# Patient Record
Sex: FEMALE | Race: WHITE | NOT HISPANIC OR LATINO | Employment: OTHER
[De-identification: names, ages, dates, MRNs, and addresses within clinical notes are randomized per-mention and may not be internally consistent; named-entity substitution may affect disease eponyms.]

---

## 2017-01-05 ENCOUNTER — RECORDS - HEALTHEAST (OUTPATIENT)
Dept: ADMINISTRATIVE | Facility: OTHER | Age: 72
End: 2017-01-05

## 2017-03-06 ENCOUNTER — OFFICE VISIT - HEALTHEAST (OUTPATIENT)
Dept: INTERNAL MEDICINE | Facility: CLINIC | Age: 72
End: 2017-03-06

## 2017-03-06 DIAGNOSIS — M20.42 HAMMER TOE OF LEFT FOOT: ICD-10-CM

## 2017-03-06 DIAGNOSIS — E03.4 HYPOTHYROIDISM DUE TO ACQUIRED ATROPHY OF THYROID: ICD-10-CM

## 2017-03-06 DIAGNOSIS — E66.9 OBESITY (BMI 35.0-39.9 WITHOUT COMORBIDITY): ICD-10-CM

## 2017-03-06 DIAGNOSIS — E78.2 MIXED HYPERLIPIDEMIA: ICD-10-CM

## 2017-03-06 DIAGNOSIS — Z23 NEED FOR VACCINATION: ICD-10-CM

## 2017-03-06 DIAGNOSIS — Z01.818 PREOP EXAM FOR INTERNAL MEDICINE: ICD-10-CM

## 2017-03-06 ASSESSMENT — MIFFLIN-ST. JEOR: SCORE: 1693.26

## 2017-03-13 ENCOUNTER — RECORDS - HEALTHEAST (OUTPATIENT)
Dept: ADMINISTRATIVE | Facility: OTHER | Age: 72
End: 2017-03-13

## 2017-03-30 ENCOUNTER — RECORDS - HEALTHEAST (OUTPATIENT)
Dept: ADMINISTRATIVE | Facility: OTHER | Age: 72
End: 2017-03-30

## 2017-04-13 ENCOUNTER — COMMUNICATION - HEALTHEAST (OUTPATIENT)
Dept: INTERNAL MEDICINE | Facility: CLINIC | Age: 72
End: 2017-04-13

## 2017-04-13 DIAGNOSIS — E03.4 HYPOTHYROIDISM DUE TO ACQUIRED ATROPHY OF THYROID: ICD-10-CM

## 2017-04-18 ENCOUNTER — HOSPITAL ENCOUNTER (OUTPATIENT)
Dept: MAMMOGRAPHY | Facility: CLINIC | Age: 72
Discharge: HOME | End: 2017-04-18
Attending: INTERNAL MEDICINE

## 2017-04-18 DIAGNOSIS — Z12.31 VISIT FOR SCREENING MAMMOGRAM: ICD-10-CM

## 2017-05-24 ENCOUNTER — COMMUNICATION - HEALTHEAST (OUTPATIENT)
Dept: INTERNAL MEDICINE | Facility: CLINIC | Age: 72
End: 2017-05-24

## 2017-09-14 ENCOUNTER — RECORDS - HEALTHEAST (OUTPATIENT)
Dept: ADMINISTRATIVE | Facility: OTHER | Age: 72
End: 2017-09-14

## 2017-12-04 ENCOUNTER — COMMUNICATION - HEALTHEAST (OUTPATIENT)
Dept: INTERNAL MEDICINE | Facility: CLINIC | Age: 72
End: 2017-12-04

## 2017-12-04 ENCOUNTER — OFFICE VISIT - HEALTHEAST (OUTPATIENT)
Dept: INTERNAL MEDICINE | Facility: CLINIC | Age: 72
End: 2017-12-04

## 2017-12-04 DIAGNOSIS — J32.9 SINUSITIS: ICD-10-CM

## 2017-12-04 DIAGNOSIS — J04.0 LARYNGITIS: ICD-10-CM

## 2017-12-27 ENCOUNTER — COMMUNICATION - HEALTHEAST (OUTPATIENT)
Dept: SCHEDULING | Facility: CLINIC | Age: 72
End: 2017-12-27

## 2017-12-27 ENCOUNTER — OFFICE VISIT - HEALTHEAST (OUTPATIENT)
Dept: INTERNAL MEDICINE | Facility: CLINIC | Age: 72
End: 2017-12-27

## 2017-12-27 ENCOUNTER — COMMUNICATION - HEALTHEAST (OUTPATIENT)
Dept: INTERNAL MEDICINE | Facility: CLINIC | Age: 72
End: 2017-12-27

## 2017-12-27 DIAGNOSIS — J20.8 VIRAL BRONCHITIS: ICD-10-CM

## 2017-12-27 ASSESSMENT — MIFFLIN-ST. JEOR: SCORE: 1693.26

## 2018-02-02 ENCOUNTER — COMMUNICATION - HEALTHEAST (OUTPATIENT)
Dept: INTERNAL MEDICINE | Facility: CLINIC | Age: 73
End: 2018-02-02

## 2018-02-02 DIAGNOSIS — E03.4 HYPOTHYROIDISM DUE TO ACQUIRED ATROPHY OF THYROID: ICD-10-CM

## 2018-02-26 ENCOUNTER — RECORDS - HEALTHEAST (OUTPATIENT)
Dept: ADMINISTRATIVE | Facility: OTHER | Age: 73
End: 2018-02-26

## 2018-04-13 ENCOUNTER — AMBULATORY - HEALTHEAST (OUTPATIENT)
Dept: INTERNAL MEDICINE | Facility: CLINIC | Age: 73
End: 2018-04-13

## 2018-04-16 ENCOUNTER — OFFICE VISIT - HEALTHEAST (OUTPATIENT)
Dept: INTERNAL MEDICINE | Facility: CLINIC | Age: 73
End: 2018-04-16

## 2018-04-16 DIAGNOSIS — M15.0 PRIMARY OSTEOARTHRITIS INVOLVING MULTIPLE JOINTS: ICD-10-CM

## 2018-04-16 DIAGNOSIS — E03.4 HYPOTHYROIDISM DUE TO ACQUIRED ATROPHY OF THYROID: ICD-10-CM

## 2018-04-16 DIAGNOSIS — Z79.899 MEDICATION MANAGEMENT: ICD-10-CM

## 2018-04-16 DIAGNOSIS — E66.9 OBESITY (BMI 35.0-39.9 WITHOUT COMORBIDITY): ICD-10-CM

## 2018-04-16 DIAGNOSIS — Z78.0 POSTMENOPAUSAL: ICD-10-CM

## 2018-04-16 DIAGNOSIS — Z00.00 ROUTINE GENERAL MEDICAL EXAMINATION AT A HEALTH CARE FACILITY: ICD-10-CM

## 2018-04-16 DIAGNOSIS — E78.2 MIXED HYPERLIPIDEMIA: ICD-10-CM

## 2018-04-16 LAB
ALBUMIN SERPL-MCNC: 4.1 G/DL (ref 3.5–5)
ALBUMIN UR-MCNC: NEGATIVE MG/DL
ALP SERPL-CCNC: 60 U/L (ref 45–120)
ALT SERPL W P-5'-P-CCNC: 47 U/L (ref 0–45)
ANION GAP SERPL CALCULATED.3IONS-SCNC: 10 MMOL/L (ref 5–18)
APPEARANCE UR: CLEAR
AST SERPL W P-5'-P-CCNC: 37 U/L (ref 0–40)
BASOPHILS # BLD AUTO: 0.1 THOU/UL (ref 0–0.2)
BASOPHILS NFR BLD AUTO: 1 % (ref 0–2)
BILIRUB SERPL-MCNC: 0.8 MG/DL (ref 0–1)
BILIRUB UR QL STRIP: NEGATIVE
BUN SERPL-MCNC: 11 MG/DL (ref 8–28)
CALCIUM SERPL-MCNC: 10.4 MG/DL (ref 8.5–10.5)
CHLORIDE BLD-SCNC: 101 MMOL/L (ref 98–107)
CHOLEST SERPL-MCNC: 202 MG/DL
CO2 SERPL-SCNC: 28 MMOL/L (ref 22–31)
COLOR UR AUTO: YELLOW
CREAT SERPL-MCNC: 0.83 MG/DL (ref 0.6–1.1)
EOSINOPHIL # BLD AUTO: 0.2 THOU/UL (ref 0–0.4)
EOSINOPHIL NFR BLD AUTO: 2 % (ref 0–6)
ERYTHROCYTE [DISTWIDTH] IN BLOOD BY AUTOMATED COUNT: 12.4 % (ref 11–14.5)
FASTING STATUS PATIENT QL REPORTED: YES
GFR SERPL CREATININE-BSD FRML MDRD: >60 ML/MIN/1.73M2
GLUCOSE BLD-MCNC: 98 MG/DL (ref 70–125)
GLUCOSE UR STRIP-MCNC: NEGATIVE MG/DL
HCT VFR BLD AUTO: 45.9 % (ref 35–47)
HDLC SERPL-MCNC: 58 MG/DL
HGB BLD-MCNC: 14.8 G/DL (ref 12–16)
HGB UR QL STRIP: NEGATIVE
KETONES UR STRIP-MCNC: NEGATIVE MG/DL
LDLC SERPL CALC-MCNC: 104 MG/DL
LEUKOCYTE ESTERASE UR QL STRIP: NEGATIVE
LYMPHOCYTES # BLD AUTO: 2.2 THOU/UL (ref 0.8–4.4)
LYMPHOCYTES NFR BLD AUTO: 32 % (ref 20–40)
MCH RBC QN AUTO: 30.6 PG (ref 27–34)
MCHC RBC AUTO-ENTMCNC: 32.2 G/DL (ref 32–36)
MCV RBC AUTO: 95 FL (ref 80–100)
MONOCYTES # BLD AUTO: 0.5 THOU/UL (ref 0–0.9)
MONOCYTES NFR BLD AUTO: 8 % (ref 2–10)
NEUTROPHILS # BLD AUTO: 3.9 THOU/UL (ref 2–7.7)
NEUTROPHILS NFR BLD AUTO: 58 % (ref 50–70)
NITRATE UR QL: NEGATIVE
PH UR STRIP: 6 [PH] (ref 5–8)
PLATELET # BLD AUTO: 192 THOU/UL (ref 140–440)
PMV BLD AUTO: 11.4 FL (ref 8.5–12.5)
POTASSIUM BLD-SCNC: 4.7 MMOL/L (ref 3.5–5)
PROT SERPL-MCNC: 7.3 G/DL (ref 6–8)
RBC # BLD AUTO: 4.83 MILL/UL (ref 3.8–5.4)
SODIUM SERPL-SCNC: 139 MMOL/L (ref 136–145)
SP GR UR STRIP: 1.01 (ref 1–1.03)
TRIGL SERPL-MCNC: 200 MG/DL
TSH SERPL DL<=0.005 MIU/L-ACNC: 0.61 UIU/ML (ref 0.3–5)
UROBILINOGEN UR STRIP-ACNC: NORMAL
WBC: 6.8 THOU/UL (ref 4–11)

## 2018-04-16 ASSESSMENT — MIFFLIN-ST. JEOR: SCORE: 1702.33

## 2018-04-17 ENCOUNTER — COMMUNICATION - HEALTHEAST (OUTPATIENT)
Dept: INTERNAL MEDICINE | Facility: CLINIC | Age: 73
End: 2018-04-17

## 2018-04-20 ENCOUNTER — HOSPITAL ENCOUNTER (OUTPATIENT)
Dept: MAMMOGRAPHY | Facility: CLINIC | Age: 73
Discharge: HOME | End: 2018-04-20
Attending: INTERNAL MEDICINE

## 2018-04-20 DIAGNOSIS — Z12.31 VISIT FOR SCREENING MAMMOGRAM: ICD-10-CM

## 2018-04-27 ENCOUNTER — RECORDS - HEALTHEAST (OUTPATIENT)
Dept: BONE DENSITY | Facility: CLINIC | Age: 73
End: 2018-04-27

## 2018-04-27 ENCOUNTER — RECORDS - HEALTHEAST (OUTPATIENT)
Dept: ADMINISTRATIVE | Facility: OTHER | Age: 73
End: 2018-04-27

## 2018-04-27 DIAGNOSIS — Z78.0 ASYMPTOMATIC MENOPAUSAL STATE: ICD-10-CM

## 2018-05-14 ENCOUNTER — COMMUNICATION - HEALTHEAST (OUTPATIENT)
Dept: INTERNAL MEDICINE | Facility: CLINIC | Age: 73
End: 2018-05-14

## 2018-05-16 ENCOUNTER — RECORDS - HEALTHEAST (OUTPATIENT)
Dept: ADMINISTRATIVE | Facility: OTHER | Age: 73
End: 2018-05-16

## 2018-05-25 ENCOUNTER — RECORDS - HEALTHEAST (OUTPATIENT)
Dept: ADMINISTRATIVE | Facility: OTHER | Age: 73
End: 2018-05-25

## 2018-06-26 ENCOUNTER — COMMUNICATION - HEALTHEAST (OUTPATIENT)
Dept: INTERNAL MEDICINE | Facility: CLINIC | Age: 73
End: 2018-06-26

## 2018-06-26 DIAGNOSIS — E03.4 HYPOTHYROIDISM DUE TO ACQUIRED ATROPHY OF THYROID: ICD-10-CM

## 2018-06-26 DIAGNOSIS — E78.2 MIXED HYPERLIPIDEMIA: ICD-10-CM

## 2018-10-09 ENCOUNTER — RECORDS - HEALTHEAST (OUTPATIENT)
Dept: ADMINISTRATIVE | Facility: OTHER | Age: 73
End: 2018-10-09

## 2018-10-11 ENCOUNTER — RECORDS - HEALTHEAST (OUTPATIENT)
Dept: ADMINISTRATIVE | Facility: OTHER | Age: 73
End: 2018-10-11

## 2018-11-05 ENCOUNTER — RECORDS - HEALTHEAST (OUTPATIENT)
Dept: ADMINISTRATIVE | Facility: OTHER | Age: 73
End: 2018-11-05

## 2019-04-09 ENCOUNTER — OFFICE VISIT - HEALTHEAST (OUTPATIENT)
Dept: INTERNAL MEDICINE | Facility: CLINIC | Age: 74
End: 2019-04-09

## 2019-04-09 DIAGNOSIS — J40 BRONCHITIS: ICD-10-CM

## 2019-04-30 ENCOUNTER — HOSPITAL ENCOUNTER (OUTPATIENT)
Dept: MAMMOGRAPHY | Facility: CLINIC | Age: 74
Discharge: HOME | End: 2019-04-30
Attending: INTERNAL MEDICINE

## 2019-04-30 DIAGNOSIS — Z12.31 ENCOUNTER FOR SCREENING MAMMOGRAM FOR BREAST CANCER: ICD-10-CM

## 2019-05-07 ENCOUNTER — OFFICE VISIT - HEALTHEAST (OUTPATIENT)
Dept: INTERNAL MEDICINE | Facility: CLINIC | Age: 74
End: 2019-05-07

## 2019-05-07 ENCOUNTER — COMMUNICATION - HEALTHEAST (OUTPATIENT)
Dept: INTERNAL MEDICINE | Facility: CLINIC | Age: 74
End: 2019-05-07

## 2019-05-07 DIAGNOSIS — Z00.00 ROUTINE GENERAL MEDICAL EXAMINATION AT A HEALTH CARE FACILITY: ICD-10-CM

## 2019-05-07 DIAGNOSIS — E66.9 OBESITY (BMI 35.0-39.9 WITHOUT COMORBIDITY): ICD-10-CM

## 2019-05-07 DIAGNOSIS — E03.4 HYPOTHYROIDISM DUE TO ACQUIRED ATROPHY OF THYROID: ICD-10-CM

## 2019-05-07 DIAGNOSIS — E78.2 MIXED HYPERLIPIDEMIA: ICD-10-CM

## 2019-05-07 DIAGNOSIS — M15.0 PRIMARY OSTEOARTHRITIS INVOLVING MULTIPLE JOINTS: ICD-10-CM

## 2019-05-07 DIAGNOSIS — Z79.899 MEDICATION MANAGEMENT: ICD-10-CM

## 2019-05-07 LAB
ALBUMIN SERPL-MCNC: 4.1 G/DL (ref 3.5–5)
ALBUMIN UR-MCNC: NEGATIVE MG/DL
ALP SERPL-CCNC: 49 U/L (ref 45–120)
ALT SERPL W P-5'-P-CCNC: 39 U/L (ref 0–45)
ANION GAP SERPL CALCULATED.3IONS-SCNC: 11 MMOL/L (ref 5–18)
APPEARANCE UR: CLEAR
AST SERPL W P-5'-P-CCNC: 38 U/L (ref 0–40)
BASOPHILS # BLD AUTO: 0 THOU/UL (ref 0–0.2)
BASOPHILS NFR BLD AUTO: 1 % (ref 0–2)
BILIRUB SERPL-MCNC: 0.4 MG/DL (ref 0–1)
BILIRUB UR QL STRIP: NEGATIVE
BUN SERPL-MCNC: 12 MG/DL (ref 8–28)
CALCIUM SERPL-MCNC: 10.2 MG/DL (ref 8.5–10.5)
CHLORIDE BLD-SCNC: 106 MMOL/L (ref 98–107)
CHOLEST SERPL-MCNC: 184 MG/DL
CO2 SERPL-SCNC: 24 MMOL/L (ref 22–31)
COLOR UR AUTO: YELLOW
CREAT SERPL-MCNC: 0.85 MG/DL (ref 0.6–1.1)
EOSINOPHIL # BLD AUTO: 0.2 THOU/UL (ref 0–0.4)
EOSINOPHIL NFR BLD AUTO: 4 % (ref 0–6)
ERYTHROCYTE [DISTWIDTH] IN BLOOD BY AUTOMATED COUNT: 13 % (ref 11–14.5)
FASTING STATUS PATIENT QL REPORTED: NORMAL
GFR SERPL CREATININE-BSD FRML MDRD: >60 ML/MIN/1.73M2
GLUCOSE BLD-MCNC: 97 MG/DL (ref 70–125)
GLUCOSE UR STRIP-MCNC: NEGATIVE MG/DL
HCT VFR BLD AUTO: 46.1 % (ref 35–47)
HDLC SERPL-MCNC: 60 MG/DL
HGB BLD-MCNC: 14.9 G/DL (ref 12–16)
HGB UR QL STRIP: NEGATIVE
KETONES UR STRIP-MCNC: NEGATIVE MG/DL
LDLC SERPL CALC-MCNC: 95 MG/DL
LEUKOCYTE ESTERASE UR QL STRIP: NEGATIVE
LYMPHOCYTES # BLD AUTO: 1.5 THOU/UL (ref 0.8–4.4)
LYMPHOCYTES NFR BLD AUTO: 36 % (ref 20–40)
MCH RBC QN AUTO: 29.9 PG (ref 27–34)
MCHC RBC AUTO-ENTMCNC: 32.3 G/DL (ref 32–36)
MCV RBC AUTO: 93 FL (ref 80–100)
MONOCYTES # BLD AUTO: 0.5 THOU/UL (ref 0–0.9)
MONOCYTES NFR BLD AUTO: 11 % (ref 2–10)
NEUTROPHILS # BLD AUTO: 2 THOU/UL (ref 2–7.7)
NEUTROPHILS NFR BLD AUTO: 48 % (ref 50–70)
NITRATE UR QL: NEGATIVE
PH UR STRIP: 6 [PH] (ref 5–8)
PLATELET # BLD AUTO: 163 THOU/UL (ref 140–440)
PMV BLD AUTO: 10.9 FL (ref 8.5–12.5)
POTASSIUM BLD-SCNC: 5 MMOL/L (ref 3.5–5)
PROT SERPL-MCNC: 7.5 G/DL (ref 6–8)
RBC # BLD AUTO: 4.98 MILL/UL (ref 3.8–5.4)
SODIUM SERPL-SCNC: 141 MMOL/L (ref 136–145)
SP GR UR STRIP: 1.02 (ref 1–1.03)
TRIGL SERPL-MCNC: 146 MG/DL
TSH SERPL DL<=0.005 MIU/L-ACNC: 1.45 UIU/ML (ref 0.3–5)
UROBILINOGEN UR STRIP-ACNC: NORMAL
WBC: 4.1 THOU/UL (ref 4–11)

## 2019-05-07 ASSESSMENT — MIFFLIN-ST. JEOR: SCORE: 1706.87

## 2019-05-08 ENCOUNTER — COMMUNICATION - HEALTHEAST (OUTPATIENT)
Dept: INTERNAL MEDICINE | Facility: CLINIC | Age: 74
End: 2019-05-08

## 2019-05-08 LAB
ATRIAL RATE - MUSE: 62 BPM
DIASTOLIC BLOOD PRESSURE - MUSE: NORMAL MMHG
INTERPRETATION ECG - MUSE: NORMAL
P AXIS - MUSE: 22 DEGREES
PR INTERVAL - MUSE: 186 MS
QRS DURATION - MUSE: 88 MS
QT - MUSE: 406 MS
QTC - MUSE: 412 MS
R AXIS - MUSE: 55 DEGREES
SYSTOLIC BLOOD PRESSURE - MUSE: NORMAL MMHG
T AXIS - MUSE: 13 DEGREES
VENTRICULAR RATE- MUSE: 62 BPM

## 2019-05-13 ENCOUNTER — RECORDS - HEALTHEAST (OUTPATIENT)
Dept: ADMINISTRATIVE | Facility: OTHER | Age: 74
End: 2019-05-13

## 2019-11-13 ENCOUNTER — RECORDS - HEALTHEAST (OUTPATIENT)
Dept: ADMINISTRATIVE | Facility: OTHER | Age: 74
End: 2019-11-13

## 2020-01-08 ENCOUNTER — RECORDS - HEALTHEAST (OUTPATIENT)
Dept: ADMINISTRATIVE | Facility: OTHER | Age: 75
End: 2020-01-08

## 2020-01-17 ENCOUNTER — RECORDS - HEALTHEAST (OUTPATIENT)
Dept: ADMINISTRATIVE | Facility: OTHER | Age: 75
End: 2020-01-17

## 2020-01-30 ENCOUNTER — RECORDS - HEALTHEAST (OUTPATIENT)
Dept: ADMINISTRATIVE | Facility: OTHER | Age: 75
End: 2020-01-30

## 2020-02-04 ENCOUNTER — RECORDS - HEALTHEAST (OUTPATIENT)
Dept: ADMINISTRATIVE | Facility: OTHER | Age: 75
End: 2020-02-04

## 2020-02-13 ENCOUNTER — RECORDS - HEALTHEAST (OUTPATIENT)
Dept: ADMINISTRATIVE | Facility: OTHER | Age: 75
End: 2020-02-13

## 2020-03-18 ENCOUNTER — COMMUNICATION - HEALTHEAST (OUTPATIENT)
Dept: INTERNAL MEDICINE | Facility: CLINIC | Age: 75
End: 2020-03-18

## 2020-03-18 DIAGNOSIS — E03.4 HYPOTHYROIDISM DUE TO ACQUIRED ATROPHY OF THYROID: ICD-10-CM

## 2020-03-18 DIAGNOSIS — E78.2 MIXED HYPERLIPIDEMIA: ICD-10-CM

## 2020-05-18 ENCOUNTER — RECORDS - HEALTHEAST (OUTPATIENT)
Dept: ADMINISTRATIVE | Facility: OTHER | Age: 75
End: 2020-05-18

## 2020-06-10 ENCOUNTER — COMMUNICATION - HEALTHEAST (OUTPATIENT)
Dept: INTERNAL MEDICINE | Facility: CLINIC | Age: 75
End: 2020-06-10

## 2020-06-10 DIAGNOSIS — E03.4 HYPOTHYROIDISM DUE TO ACQUIRED ATROPHY OF THYROID: ICD-10-CM

## 2020-06-10 DIAGNOSIS — E78.2 MIXED HYPERLIPIDEMIA: ICD-10-CM

## 2020-06-11 RX ORDER — LEVOTHYROXINE SODIUM 125 UG/1
TABLET ORAL
Qty: 90 TABLET | Refills: 3 | Status: SHIPPED | OUTPATIENT
Start: 2020-06-11 | End: 2021-07-19

## 2020-06-11 RX ORDER — PRAVASTATIN SODIUM 20 MG
TABLET ORAL
Qty: 90 TABLET | Refills: 3 | Status: SHIPPED | OUTPATIENT
Start: 2020-06-11 | End: 2021-07-19

## 2020-07-21 ENCOUNTER — OFFICE VISIT - HEALTHEAST (OUTPATIENT)
Dept: INTERNAL MEDICINE | Facility: CLINIC | Age: 75
End: 2020-07-21

## 2020-07-21 DIAGNOSIS — E66.9 OBESITY (BMI 35.0-39.9 WITHOUT COMORBIDITY): ICD-10-CM

## 2020-07-21 DIAGNOSIS — Z79.899 MEDICATION MANAGEMENT: ICD-10-CM

## 2020-07-21 DIAGNOSIS — E03.4 HYPOTHYROIDISM DUE TO ACQUIRED ATROPHY OF THYROID: ICD-10-CM

## 2020-07-21 DIAGNOSIS — E78.2 MIXED HYPERLIPIDEMIA: ICD-10-CM

## 2020-07-21 DIAGNOSIS — I10 ESSENTIAL HYPERTENSION, BENIGN: ICD-10-CM

## 2020-07-21 DIAGNOSIS — M15.0 PRIMARY OSTEOARTHRITIS INVOLVING MULTIPLE JOINTS: ICD-10-CM

## 2020-07-21 DIAGNOSIS — Z00.00 HEALTHCARE MAINTENANCE: ICD-10-CM

## 2020-07-21 LAB
ALBUMIN SERPL-MCNC: 4.2 G/DL (ref 3.5–5)
ALBUMIN UR-MCNC: NEGATIVE MG/DL
ALP SERPL-CCNC: 53 U/L (ref 45–120)
ALT SERPL W P-5'-P-CCNC: 41 U/L (ref 0–45)
ANION GAP SERPL CALCULATED.3IONS-SCNC: 9 MMOL/L (ref 5–18)
APPEARANCE UR: CLEAR
AST SERPL W P-5'-P-CCNC: 31 U/L (ref 0–40)
ATRIAL RATE - MUSE: 59 BPM
BASOPHILS # BLD AUTO: 0.1 THOU/UL (ref 0–0.2)
BASOPHILS NFR BLD AUTO: 1 % (ref 0–2)
BILIRUB SERPL-MCNC: 0.5 MG/DL (ref 0–1)
BILIRUB UR QL STRIP: NEGATIVE
BUN SERPL-MCNC: 14 MG/DL (ref 8–28)
CALCIUM SERPL-MCNC: 10.3 MG/DL (ref 8.5–10.5)
CHLORIDE BLD-SCNC: 104 MMOL/L (ref 98–107)
CHOLEST SERPL-MCNC: 182 MG/DL
CO2 SERPL-SCNC: 29 MMOL/L (ref 22–31)
COLOR UR AUTO: YELLOW
CREAT SERPL-MCNC: 1 MG/DL (ref 0.6–1.1)
DIASTOLIC BLOOD PRESSURE - MUSE: NORMAL
EOSINOPHIL # BLD AUTO: 0.2 THOU/UL (ref 0–0.4)
EOSINOPHIL NFR BLD AUTO: 3 % (ref 0–6)
ERYTHROCYTE [DISTWIDTH] IN BLOOD BY AUTOMATED COUNT: 10.9 % (ref 11–14.5)
FASTING STATUS PATIENT QL REPORTED: YES
GFR SERPL CREATININE-BSD FRML MDRD: 54 ML/MIN/1.73M2
GLUCOSE BLD-MCNC: 109 MG/DL (ref 70–125)
GLUCOSE UR STRIP-MCNC: NEGATIVE MG/DL
HCT VFR BLD AUTO: 43.3 % (ref 35–47)
HDLC SERPL-MCNC: 63 MG/DL
HGB BLD-MCNC: 14.7 G/DL (ref 12–16)
HGB UR QL STRIP: NEGATIVE
INTERPRETATION ECG - MUSE: NORMAL
KETONES UR STRIP-MCNC: NEGATIVE MG/DL
LDLC SERPL CALC-MCNC: 92 MG/DL
LEUKOCYTE ESTERASE UR QL STRIP: NEGATIVE
LYMPHOCYTES # BLD AUTO: 2.2 THOU/UL (ref 0.8–4.4)
LYMPHOCYTES NFR BLD AUTO: 35 % (ref 20–40)
MCH RBC QN AUTO: 31 PG (ref 27–34)
MCHC RBC AUTO-ENTMCNC: 33.9 G/DL (ref 32–36)
MCV RBC AUTO: 91 FL (ref 80–100)
MONOCYTES # BLD AUTO: 0.4 THOU/UL (ref 0–0.9)
MONOCYTES NFR BLD AUTO: 7 % (ref 2–10)
NEUTROPHILS # BLD AUTO: 3.5 THOU/UL (ref 2–7.7)
NEUTROPHILS NFR BLD AUTO: 55 % (ref 50–70)
NITRATE UR QL: NEGATIVE
P AXIS - MUSE: 22 DEGREES
PH UR STRIP: 5.5 [PH] (ref 5–8)
PLATELET # BLD AUTO: 198 THOU/UL (ref 140–440)
PMV BLD AUTO: 7.7 FL (ref 7–10)
POTASSIUM BLD-SCNC: 4.6 MMOL/L (ref 3.5–5)
PR INTERVAL - MUSE: 174 MS
PROT SERPL-MCNC: 7.3 G/DL (ref 6–8)
QRS DURATION - MUSE: 86 MS
QT - MUSE: 400 MS
QTC - MUSE: 396 MS
R AXIS - MUSE: 2 DEGREES
RBC # BLD AUTO: 4.75 MILL/UL (ref 3.8–5.4)
SODIUM SERPL-SCNC: 142 MMOL/L (ref 136–145)
SP GR UR STRIP: 1.02 (ref 1–1.03)
SYSTOLIC BLOOD PRESSURE - MUSE: NORMAL
T AXIS - MUSE: 39 DEGREES
TRIGL SERPL-MCNC: 133 MG/DL
TSH SERPL DL<=0.005 MIU/L-ACNC: 0.99 UIU/ML (ref 0.3–5)
UROBILINOGEN UR STRIP-ACNC: NORMAL
VENTRICULAR RATE- MUSE: 59 BPM
WBC: 6.4 THOU/UL (ref 4–11)

## 2020-07-21 ASSESSMENT — MIFFLIN-ST. JEOR: SCORE: 1725.01

## 2020-07-22 ENCOUNTER — COMMUNICATION - HEALTHEAST (OUTPATIENT)
Dept: INTERNAL MEDICINE | Facility: CLINIC | Age: 75
End: 2020-07-22

## 2020-09-11 ENCOUNTER — HOSPITAL ENCOUNTER (OUTPATIENT)
Dept: MAMMOGRAPHY | Facility: CLINIC | Age: 75
Discharge: HOME | End: 2020-09-11
Attending: INTERNAL MEDICINE

## 2020-09-11 DIAGNOSIS — Z12.31 VISIT FOR SCREENING MAMMOGRAM: ICD-10-CM

## 2021-02-08 ENCOUNTER — RECORDS - HEALTHEAST (OUTPATIENT)
Dept: ADMINISTRATIVE | Facility: OTHER | Age: 76
End: 2021-02-08

## 2021-05-11 ENCOUNTER — RECORDS - HEALTHEAST (OUTPATIENT)
Dept: ADMINISTRATIVE | Facility: OTHER | Age: 76
End: 2021-05-11

## 2021-05-24 ENCOUNTER — RECORDS - HEALTHEAST (OUTPATIENT)
Dept: ADMINISTRATIVE | Facility: CLINIC | Age: 76
End: 2021-05-24

## 2021-05-25 ENCOUNTER — RECORDS - HEALTHEAST (OUTPATIENT)
Dept: ADMINISTRATIVE | Facility: CLINIC | Age: 76
End: 2021-05-25

## 2021-05-26 ENCOUNTER — RECORDS - HEALTHEAST (OUTPATIENT)
Dept: ADMINISTRATIVE | Facility: CLINIC | Age: 76
End: 2021-05-26

## 2021-05-27 ENCOUNTER — RECORDS - HEALTHEAST (OUTPATIENT)
Dept: ADMINISTRATIVE | Facility: OTHER | Age: 76
End: 2021-05-27

## 2021-05-27 ENCOUNTER — RECORDS - HEALTHEAST (OUTPATIENT)
Dept: ADMINISTRATIVE | Facility: CLINIC | Age: 76
End: 2021-05-27

## 2021-05-27 NOTE — PROGRESS NOTES
OFFICE VISIT NOTE    Subjective:   Chief Complaint:  Cough (X 1 week, going on a cruise Saturday)    73-year-old woman who is been having a problem with chest congestion and a cough for the past 6 days.  Pleurisy, chest pain, dyspnea.  Never any fevers or chills.  Denies sore throat or earache.  No GI symptoms.    Current Outpatient Medications   Medication Sig     aspirin 81 MG EC tablet Take 81 mg by mouth daily.     calcium-vitamin D (CALCIUM-VITAMIN D) 500 mg(1,250mg) -200 unit per tablet Take 1 tablet by mouth daily.      levothyroxine (SYNTHROID, LEVOTHROID) 125 MCG tablet TAKE 1 TABLET EVERY DAY (OVERDUE FOR TSH)     multivitamin therapeutic (THERAGRAN) tablet Take 1 tablet by mouth daily.     OMEGA-3/DHA/EPA/FISH OIL (FISH OIL-OMEGA-3 FATTY ACIDS) 300-1,000 mg capsule Take 2 g by mouth daily.     pravastatin (PRAVACHOL) 20 MG tablet TAKE 1 TABLET EVERY EVENING     RED YEAST RICE ORAL Take by mouth.     methylPREDNISolone (MEDROL DOSEPACK) 4 mg tablet Follow package directions       Review of Systems:  A comprehensive review of systems is negative except for the comments above    Objective:    Temp 97.4  F (36.3  C) (Oral)   GENERAL: No acute distress.  She is afebrile.  Oxygen saturations 97%.  Blood pressure 142/84.  ENT exam is normal.  She does have hearing aids.  Heart shows a regular rhythm without gallop  Lungs have no rhonchi.  She does have significant expiratory wheezes.  Auditory rate is 14.  She is comfortable.    Assessment & Plan   Qian Lee is a 73 y.o. female.    Symptoms compatible with a viral bronchitis and some associated wheezing.  Put on a Medrol Dosepak.  She continues Mucinex.  She will call if there is any fevers or chills.    Diagnoses and all orders for this visit:    Bronchitis  -     methylPREDNISolone (MEDROL DOSEPACK) 4 mg tablet; Follow package directions  Dispense: 21 tablet; Refill: 0        Johann Tucker MD  Transcription using voice recognition software, may  contain typographical errors.

## 2021-05-28 ENCOUNTER — RECORDS - HEALTHEAST (OUTPATIENT)
Dept: ADMINISTRATIVE | Facility: CLINIC | Age: 76
End: 2021-05-28

## 2021-05-28 NOTE — PROGRESS NOTES
Assessment and Plan:   73-year-old woman in for annual wellness visit and exam.  She has an upper respiratory infection, otherwise is doing okay.    1. Routine general medical examination at a health care facility  Except for obesity her exam is good today.  - Urinalysis-UC if Indicated  Blood pressure was borderline today at 142/72.  She is going to exercise and lose some weight to see if the pressure normalizes.  2. Mixed hyperlipidemia  Currently is on pravastatin.  No claudication angina or TIAs.  - Lipid Cascade  - Electrocardiogram Perform and Read    3. Hypothyroidism due to acquired atrophy of thyroid  Check TSH today.  - Thyroid Cascade    4. Obesity (BMI 35.0-39.9 without comorbidity)  We talked about the necessity of dropping her weight.  Blood pressure is borderline today and I think weight loss and exercise will get the blood pressure down.    5. Primary osteoarthritis involving multiple joints  Both knees have been surgically replaced.  She does well at this time.    6. Medication management  No trouble with medications.  - HM1(CBC and Differential)  - Comprehensive Metabolic Panel  - HM1 (CBC with Diff)        The patient's current medical problems were reviewed.    I have had an Advance Directives discussion with the patient.  The following health maintenance schedule was reviewed with the patient and provided in printed form in the after visit summary:   Health Maintenance   Topic Date Due     ZOSTER VACCINES (3 of 3) 04/16/2019     FALL RISK ASSESSMENT  04/09/2020     DXA SCAN  04/27/2020     MAMMOGRAM  04/30/2021     ADVANCE DIRECTIVES DISCUSSED WITH PATIENT  04/16/2023     COLONOSCOPY  07/02/2024     TD 18+ HE  03/06/2027     PNEUMOCOCCAL POLYSACCHARIDE VACCINE AGE 65 AND OVER  Completed     INFLUENZA VACCINE RULE BASED  Completed     PNEUMOCOCCAL CONJUGATE VACCINE FOR ADULTS (PCV13 OR PREVNAR)  Completed        Subjective:   Chief Complaint: Qian Lee is an 73 y.o. female here for an  Annual Wellness visit.   HPI: 73-year-old woman in for annual this visit and exam  Generally doing okay.  Minor URI at this time.  No chest pain or orthopnea  No cough wheezing or asthma  No claudication  No TIAs, migraine, tremor, neuropathy.  Joints currently feel good.  She has had both knees replaced.  Chronic spine issues.  No diabetes.  Does have thyroid replacement because of hypothyroidism  No current fevers chills weight gain or weight loss    Review of Systems:    Please see above.  The rest of the review of systems are negative for all systems.    Patient Care Team:  Johann Tucker MD as PCP - General (Internal Medicine)     Patient Active Problem List   Diagnosis     Hypothyroidism     Hyperlipidemia     Obesity (BMI 35.0-39.9 without comorbidity)     Osteoarthritis of multiple joints     SNHL (sensorineural hearing loss)     Tinnitus of both ears     Past Medical History:   Diagnosis Date     Elevated triglycerides with high cholesterol       Past Surgical History:   Procedure Laterality Date     HYSTERECTOMY Bilateral 1995     OOPHORECTOMY       TOTAL KNEE ARTHROPLASTY Bilateral 2008      Family History   Problem Relation Age of Onset     Peripheral vascular disease Mother      Heart attack Mother      Heart attack Father         PASSED IN HIS 70'S     Rectal cancer Father       Social History     Socioeconomic History     Marital status: Single     Spouse name: Not on file     Number of children: Not on file     Years of education: Not on file     Highest education level: Not on file   Occupational History     Not on file   Social Needs     Financial resource strain: Not on file     Food insecurity:     Worry: Not on file     Inability: Not on file     Transportation needs:     Medical: Not on file     Non-medical: Not on file   Tobacco Use     Smoking status: Never Smoker     Smokeless tobacco: Never Used   Substance and Sexual Activity     Alcohol use: Not on file     Drug use: Not on file      "Sexual activity: Not on file   Lifestyle     Physical activity:     Days per week: Not on file     Minutes per session: Not on file     Stress: Not on file   Relationships     Social connections:     Talks on phone: Not on file     Gets together: Not on file     Attends Hoahaoism service: Not on file     Active member of club or organization: Not on file     Attends meetings of clubs or organizations: Not on file     Relationship status: Not on file     Intimate partner violence:     Fear of current or ex partner: Not on file     Emotionally abused: Not on file     Physically abused: Not on file     Forced sexual activity: Not on file   Other Topics Concern     Not on file   Social History Narrative    , 2 CHILDREN, RETIRED      Current Outpatient Medications   Medication Sig Dispense Refill     calcium-vitamin D (CALCIUM-VITAMIN D) 500 mg(1,250mg) -200 unit per tablet Take 1 tablet by mouth daily.        levothyroxine (SYNTHROID, LEVOTHROID) 125 MCG tablet TAKE 1 TABLET EVERY DAY (OVERDUE FOR TSH) 90 tablet 2     methylPREDNISolone (MEDROL DOSEPACK) 4 mg tablet Follow package directions 21 tablet 0     multivitamin therapeutic (THERAGRAN) tablet Take 1 tablet by mouth daily.       OMEGA-3/DHA/EPA/FISH OIL (FISH OIL-OMEGA-3 FATTY ACIDS) 300-1,000 mg capsule Take 2 g by mouth daily.       pravastatin (PRAVACHOL) 20 MG tablet TAKE 1 TABLET EVERY EVENING 90 tablet 2     RED YEAST RICE ORAL Take by mouth.       aspirin 81 MG EC tablet Take 81 mg by mouth daily.       No current facility-administered medications for this visit.       Objective:   Vital Signs:   Visit Vitals  Pulse 69   Ht 5' 7.25\" (1.708 m)   Wt (!) 258 lb (117 kg)   SpO2 97%   BMI 40.11 kg/m         VisionScreening:  No exam data present     PHYSICAL EXAM  Healthy but overweight woman in no distress.  Blood pressure is 142/72.  Pulse 68.  EYES: Eyelids, conjunctiva, and sclera were normal. Pupils were normal. Cornea, iris, and lens were normal " bilaterally.  HEAD, EARS, NOSE, MOUTH, AND THROAT: Head and face were normal. Hearing was normal to voice and the ears were normal to external exam. Nose appearance was normal and there was no discharge. Oropharynx was normal.  NECK: Neck appearance was normal. There were no neck masses and the thyroid was not enlarged.  No goiter.  No bruits.  RESPIRATORY: Breathing pattern was normal and the chest moved symmetrically.  Percussion/auscultatory percussion was normal.  Lung sounds were normal and there were no abnormal sounds.  CARDIOVASCULAR: Heart rate and rhythm were normal.  S1 and S2 were normal and there were no extra sounds or murmurs. Peripheral pulses in arms and legs were normal.  Jugular venous pressure was normal.  There was no peripheral edema.  GASTROINTESTINAL: The abdomen was obese in contour.  Bowel sounds were present.  Percussion detected no organ enlargement or tenderness.  Palpation detected no tenderness, mass, or enlarged organs.   MUSCULOSKELETAL: Skeletal configuration was normal and muscle mass was normal for age. Joint appearance was overall normal.  Bilateral total knees.  LYMPHATIC: There were no enlarged nodes.  SKIN/HAIR/NAILS: Skin color was normal.  There were no skin lesions.  Hair and nails were normal.  NEUROLOGIC: The patient was alert and oriented to person, place, time, and circumstance. Speech was normal. Cranial nerves were normal. Motor strength was normal for age. The patient was normally coordinated.  PSYCHIATRIC:  Mood and affect were normal and the patient had normal recent and remote memory. The patient's judgment and insight were normal.    ADDITIONAL VITAL SIGNS: Pulse 68  CHEST WALL/BREASTS: Normal female without masses.  RECTAL: Not checked  GENITAL/URINARY: Not checked        Assessment Results 5/7/2019   Activities of Daily Living No help needed   Instrumental Activities of Daily Living No help needed   Get Up and Go Score Less than 12 seconds   Mini Cog Total  Score 5   Some recent data might be hidden     A Mini-Cog score of 0-2 suggests the possibility of dementia, score of 3-5 suggests no dementia    Identified Health Risks:     She is at risk for lack of exercise and has been provided with information to increase physical activity for the benefit of her well-being.  The patient was counseled and encouraged to consider modifying their diet and eating habits. She was provided with information on recommended healthy diet options.  Information on urinary incontinence and treatment options given to patient.  Patient's advanced directive was discussed and I am comfortable with the patient's wishes.

## 2021-05-29 ENCOUNTER — RECORDS - HEALTHEAST (OUTPATIENT)
Dept: ADMINISTRATIVE | Facility: CLINIC | Age: 76
End: 2021-05-29

## 2021-05-30 ENCOUNTER — RECORDS - HEALTHEAST (OUTPATIENT)
Dept: ADMINISTRATIVE | Facility: CLINIC | Age: 76
End: 2021-05-30

## 2021-05-30 VITALS — WEIGHT: 255 LBS | BODY MASS INDEX: 40.02 KG/M2 | HEIGHT: 67 IN

## 2021-05-31 ENCOUNTER — RECORDS - HEALTHEAST (OUTPATIENT)
Dept: ADMINISTRATIVE | Facility: CLINIC | Age: 76
End: 2021-05-31

## 2021-05-31 VITALS — HEIGHT: 67 IN | BODY MASS INDEX: 39.64 KG/M2

## 2021-05-31 VITALS — BODY MASS INDEX: 40.02 KG/M2 | WEIGHT: 255 LBS | HEIGHT: 67 IN

## 2021-06-01 VITALS — HEIGHT: 67 IN | WEIGHT: 257 LBS | BODY MASS INDEX: 40.34 KG/M2

## 2021-06-03 VITALS — WEIGHT: 258 LBS | BODY MASS INDEX: 40.49 KG/M2 | HEIGHT: 67 IN

## 2021-06-04 VITALS
WEIGHT: 262 LBS | HEIGHT: 67 IN | SYSTOLIC BLOOD PRESSURE: 138 MMHG | HEART RATE: 76 BPM | DIASTOLIC BLOOD PRESSURE: 88 MMHG | BODY MASS INDEX: 41.12 KG/M2

## 2021-06-08 NOTE — TELEPHONE ENCOUNTER
NA will try back   Information to relay to patient:  Need to change appointment on 7/21/20  We are not seeing patients face to face this week. We will have to change her AWV to a Video AWV if she would like or do AWV later on when they resume. Currently no preventative visits during July and August. Not sure if this will change.  Michelle Marshall CSS

## 2021-06-08 NOTE — TELEPHONE ENCOUNTER
RN cannot approve Refill Request    RN can NOT refill this medication Protocol failed and NO refill given.      Cuca Mueller, [facility] Triage/Med Refill 6/11/2020    Requested Prescriptions   Pending Prescriptions Disp Refills     pravastatin (PRAVACHOL) 20 MG tablet 90 tablet 3     Sig: TAKE 1 TABLET EVERY EVENING       Statins Refill Protocol (Hmg CoA Reductase Inhibitors) Failed - 6/10/2020  2:01 PM        Failed - PCP or prescribing provider visit in past 12 months      Last office visit with prescriber/PCP: 4/9/2019 Johann Tucker MD OR same dept: Visit date not found OR same specialty: 4/9/2019 Johann Tucker MD  Last physical: 5/7/2019 Last MTM visit: Visit date not found   Next visit within 3 mo: Visit date not found  Next physical within 3 mo: Visit date not found  Prescriber OR PCP: Johann Tucker MD  Last diagnosis associated with med order: 1. Mixed hyperlipidemia  - pravastatin (PRAVACHOL) 20 MG tablet; TAKE 1 TABLET EVERY EVENING  Dispense: 90 tablet; Refill: 0    2. Hypothyroidism due to acquired atrophy of thyroid  - levothyroxine (SYNTHROID, LEVOTHROID) 125 MCG tablet; TAKE 1 TABLET EVERY DAY (OVERDUE FOR TSH)  Dispense: 90 tablet; Refill: 0    If protocol passes may refill for 12 months if within 3 months of last provider visit (or a total of 15 months).                levothyroxine (SYNTHROID, LEVOTHROID) 125 MCG tablet 90 tablet 3     Sig: TAKE 1 TABLET EVERY DAY (OVERDUE FOR TSH)       Thyroid Hormones Protocol Failed - 6/10/2020  2:01 PM        Failed - Provider visit in past 12 months or next 3 months     Last office visit with prescriber/PCP: 4/9/2019 Johann Tucker MD OR same dept: Visit date not found OR same specialty: 4/9/2019 Johann Tucker MD  Last physical: 5/7/2019 Last MTM visit: Visit date not found   Next visit within 3 mo: Visit date not found  Next physical within 3 mo: Visit date not found  Prescriber OR PCP: Johann Tucker MD  Last diagnosis  associated with med order: 1. Mixed hyperlipidemia  - pravastatin (PRAVACHOL) 20 MG tablet; TAKE 1 TABLET EVERY EVENING  Dispense: 90 tablet; Refill: 0    2. Hypothyroidism due to acquired atrophy of thyroid  - levothyroxine (SYNTHROID, LEVOTHROID) 125 MCG tablet; TAKE 1 TABLET EVERY DAY (OVERDUE FOR TSH)  Dispense: 90 tablet; Refill: 0    If protocol passes may refill for 12 months if within 3 months of last provider visit (or a total of 15 months).             Failed - TSH on file in past 12 months for patient age 12 & older     TSH   Date Value Ref Range Status   05/07/2019 1.45 0.30 - 5.00 uIU/mL Final

## 2021-06-08 NOTE — TELEPHONE ENCOUNTER
Upcoming Appointment Question  When is the appointment: 07/21/20  What is your appointment for?: Physicals   Who is your appointment scheduled with?: PCP only  What is your question/concern?: Patient is asking if she need any lab work requesting provider to order labs when she come for physicals on 07/21/20.  Okay to leave a detailed message?: No

## 2021-06-09 NOTE — PROGRESS NOTES
Assessment and Plan:   74-year-old woman in for annual wellness visit and exam.    1. Primary osteoarthritis involving multiple joints  She has had both knees replaced.  Has mild to moderate osteoarthritis in her hands.    2. Obesity (BMI 35.0-39.9 without comorbidity)  She has gained some weight and her blood pressure is now up a bit.  She needs to diet.    3. Hypothyroidism due to acquired atrophy of thyroid  Takes her thyroid daily.  We will check a TSH.  - Thyroid Cascade    4. Mixed hyperlipidemia  No angina claudication or TIAs.  Check lipid profile.  - Lipid Cascade    5. Medication management  No trouble with current medications.  - HM1(CBC and Differential)  - Comprehensive Metabolic Panel  - HM1 (CBC with Diff)    6. Healthcare maintenance  Immunizations are up-to-date.  She will need a bone density next year.  - Urinalysis-UC if Indicated  Blood pressure is a little high today at 138/92.  She has seen him weight gain and her pressures come up with it.  We will try weight loss and salt restriction and see if this pressure comes down      The patient's current medical problems were reviewed.    I have had an Advance Directives discussion with the patient.  The following health maintenance schedule was reviewed with the patient and provided in printed form in the after visit summary:   Health Maintenance   Topic Date Due     HEPATITIS C SCREENING  1945     PNEUMOCOCCAL IMMUNIZATION 65+ LOW/MEDIUM RISK (2 of 2 - PPSV23) 02/24/2016     ZOSTER VACCINES (3 of 3) 04/16/2019     DXA SCAN  04/27/2020     MEDICARE ANNUAL WELLNESS VISIT  05/07/2020     FALL RISK ASSESSMENT  05/07/2020     INFLUENZA VACCINE RULE BASED (1) 08/01/2020     MAMMOGRAM  04/30/2021     LIPID  05/07/2024     ADVANCE CARE PLANNING  05/07/2024     TD 18+ HE  03/06/2027     COLORECTAL CANCER SCREENING  11/13/2029     HEPATITIS B VACCINES  Aged Out        Subjective:   Chief Complaint: Qian Lee is an 74 y.o. female here for an  Annual Wellness visit.   HPI: 74-year-old woman for annual wellness visit and exam.  Comprehensive system review is done.  Complains of some minor back discomfort.  No sciatica.  No cardiopulmonary symptoms such as chest pain or dyspnea on exertion.  No orthopnea  No GI problems such as heartburn, dysphagia, change in bowel habits.  No black stools.  No dysuria hematuria or history of stones  No TIAs, tremor, epilepsy.  No neuropathy.  No dermatologic problems.  Sees a dermatologist.  Has hearing aids.  Rest of the system review is negative      Review of Systems:    Please see above.  The rest of the review of systems are negative for all systems.    Patient Care Team:  Johann Tucker MD as PCP - General (Internal Medicine)  Johann Tucker MD as Assigned PCP     Patient Active Problem List   Diagnosis     Hypothyroidism     Hyperlipidemia     Obesity (BMI 35.0-39.9 without comorbidity)     Osteoarthritis of multiple joints     SNHL (sensorineural hearing loss)     Tinnitus of both ears     Past Medical History:   Diagnosis Date     Elevated triglycerides with high cholesterol       Past Surgical History:   Procedure Laterality Date     HYSTERECTOMY Bilateral 1995     OOPHORECTOMY       TOTAL KNEE ARTHROPLASTY Bilateral 2008      Family History   Problem Relation Age of Onset     Peripheral vascular disease Mother      Heart attack Mother      Heart attack Father         PASSED IN HIS 70'S     Rectal cancer Father       Social History     Socioeconomic History     Marital status: Single     Spouse name: Not on file     Number of children: Not on file     Years of education: Not on file     Highest education level: Not on file   Occupational History     Not on file   Social Needs     Financial resource strain: Not on file     Food insecurity     Worry: Not on file     Inability: Not on file     Transportation needs     Medical: Not on file     Non-medical: Not on file   Tobacco Use     Smoking status: Never  Smoker     Smokeless tobacco: Never Used   Substance and Sexual Activity     Alcohol use: Not on file     Drug use: Not on file     Sexual activity: Not on file   Lifestyle     Physical activity     Days per week: Not on file     Minutes per session: Not on file     Stress: Not on file   Relationships     Social connections     Talks on phone: Not on file     Gets together: Not on file     Attends Synagogue service: Not on file     Active member of club or organization: Not on file     Attends meetings of clubs or organizations: Not on file     Relationship status: Not on file     Intimate partner violence     Fear of current or ex partner: Not on file     Emotionally abused: Not on file     Physically abused: Not on file     Forced sexual activity: Not on file   Other Topics Concern     Not on file   Social History Narrative    , 2 CHILDREN, RETIRED      Current Outpatient Medications   Medication Sig Dispense Refill     aspirin 81 MG EC tablet Take 81 mg by mouth daily.       calcium-vitamin D (CALCIUM-VITAMIN D) 500 mg(1,250mg) -200 unit per tablet Take 1 tablet by mouth daily.        levothyroxine (SYNTHROID, LEVOTHROID) 125 MCG tablet TAKE 1 TABLET EVERY DAY (OVERDUE FOR TSH) 90 tablet 3     methylPREDNISolone (MEDROL DOSEPACK) 4 mg tablet Follow package directions 21 tablet 0     multivitamin therapeutic (THERAGRAN) tablet Take 1 tablet by mouth daily.       OMEGA-3/DHA/EPA/FISH OIL (FISH OIL-OMEGA-3 FATTY ACIDS) 300-1,000 mg capsule Take 2 g by mouth daily.       pravastatin (PRAVACHOL) 20 MG tablet TAKE 1 TABLET EVERY EVENING 90 tablet 3     RED YEAST RICE ORAL Take by mouth.       No current facility-administered medications for this visit.       Objective:   Vital Signs: There were no vitals taken for this visit.       VisionScreening:  No exam data present     PHYSICAL EXAM  Pressure 138/92.  Pulse 76.  EYES: Eyelids, conjunctiva, and sclera were normal. Pupils were normal. Cornea, iris, and lens  were normal bilaterally.  HEAD, EARS, NOSE, MOUTH, AND THROAT: Head and face were normal. Hearing was normal to voice and the ears were normal to external exam. Nose appearance was normal and there was no discharge. Oropharynx was normal.  NECK: Neck appearance was normal. There were no neck masses and the thyroid was not enlarged.  No bruits.  RESPIRATORY: Breathing pattern was normal and the chest moved symmetrically.  Percussion/auscultatory percussion was normal.  Lung sounds were normal and there were no abnormal sounds.  CARDIOVASCULAR: Heart rate and rhythm were normal.  S1 and S2 were normal and there were no extra sounds or murmurs. Peripheral pulses in arms and legs were normal.  Jugular venous pressure was normal.  There was no peripheral edema.  GASTROINTESTINAL: The abdomen was obese in contour.  Bowel sounds were present.  Percussion detected no organ enlargement or tenderness.  Palpation detected no tenderness, mass, or enlarged organs.   MUSCULOSKELETAL: Skeletal configuration was normal and muscle mass was normal for age. Joint appearance was overall normal.  Bilateral knee replacements.  OA changes of the fingers.  LYMPHATIC: There were no enlarged nodes.  SKIN/HAIR/NAILS: Skin color was normal.  There were no skin lesions.  Hair and nails were normal.  NEUROLOGIC: The patient was alert and oriented to person, place, time, and circumstance. Speech was normal. Cranial nerves were normal. Motor strength was normal for age. The patient was normally coordinated.  PSYCHIATRIC:  Mood and affect were normal and the patient had normal recent and remote memory. The patient's judgment and insight were normal.    ADDITIONAL VITAL SIGNS: Pulse 76  CHEST WALL/BREASTS: Normal female no masses  RECTAL: Not checked  GENITAL/URINARY: Not checked        Assessment Results 5/7/2019   Activities of Daily Living No help needed   Instrumental Activities of Daily Living No help needed   Get Up and Go Score Less than 12  seconds   Mini Cog Total Score 5   Some recent data might be hidden     A Mini-Cog score of 0-2 suggests the possibility of dementia, score of 3-5 suggests no dementia    Normal cognition.  No signs of any dementia    Identified Health Risks:     She is at risk for lack of exercise and has been provided with information to increase physical activity for the benefit of her well-being.  The patient was counseled and encouraged to consider modifying their diet and eating habits. She was provided with information on recommended healthy diet options.  Information on urinary incontinence and treatment options given to patient.  Patient's advanced directive was discussed and I am comfortable with the patient's wishes.

## 2021-06-09 NOTE — PROGRESS NOTES
Preoperative Consultation   Qian Lee   71 y.o.  female in for preop exam.      Date of visit: 3/6/2017  Physician: Johann Tucker MD    This is a preoperative consultation requested by Dr. Durand in preparation for left hammertoe surgery on March 14, 2017 at [facility] .       Assessment and Plan   Qian Lee was seen in preoperative consultation in preparation for hammertoe surgery on left foot..  This is a low risk surgery and the patient has no increased risk for major cardiac complications.  Please note patient has a mild URI today, 3/6/17.  I suspect she will recover and will be able to have surgery 8 days now.    1. Preop exam for internal medicine    2. Hammer toe of left foot    3. Mixed hyperlipidemia    4. Hypothyroidism due to acquired atrophy of thyroid    5. Obesity (BMI 35.0-39.9 without comorbidity)         Patient Profile   Social History     Social History Narrative    , 2 CHILDREN, RETIRED        Past Medical History   Patient Active Problem List   Diagnosis     Hypothyroidism     Hyperlipidemia     Obesity (BMI 35.0-39.9 without comorbidity)     Osteoarthritis of multiple joints     SNHL (sensorineural hearing loss)     Tinnitus of both ears       Past Surgical History  She has a past surgical history that includes Hysterectomy (Bilateral, 1995) and Total knee arthroplasty (Bilateral, 2008).     History of Present Illness   This 71 y.o. old female is in for preop exam.  She has painful hammertoes on the left foot and will require surgery.  She has been well without any cardiopulmonary problems.     Recent antiplatelet use: no  Personal or family history of bleeding or clotting disorders: no  Steroid use in the past year: no  Personal or family history of difficulty with anesthesia: no  Current cardiopulmonary symptoms: no  Last Menstrual Period: years    Review of Systems: A comprehensive review of systems was negative except as noted.     Medications and  "Allergies   Current Outpatient Prescriptions   Medication Sig Dispense Refill     calcium-vitamin D (CALCIUM-VITAMIN D) 500 mg(1,250mg) -200 unit per tablet Take 1 tablet by mouth daily.        clotrimazole-betamethasone (LOTRISONE) cream Apply to affected area 2 times daily 15 g 1     levothyroxine (SYNTHROID) 125 MCG tablet Take 1 tablet (125 mcg total) by mouth daily. 90 tablet 3     multivitamin therapeutic (THERAGRAN) tablet Take 1 tablet by mouth daily.       OMEGA-3/DHA/EPA/FISH OIL (FISH OIL-OMEGA-3 FATTY ACIDS) 300-1,000 mg capsule Take 2 g by mouth daily.       pravastatin (PRAVACHOL) 20 MG tablet Take 1 tablet (20 mg total) by mouth every evening. 90 tablet 3     RED YEAST RICE ORAL Take by mouth.       vitamin E 400 UNIT capsule Take 400 Units by mouth daily.       No current facility-administered medications for this visit.      Allergies   Allergen Reactions     Penicillin Hives     Statins-Hmg-Coa Reductase Inhibitors Myalgia        Family and Social History   Family History   Problem Relation Age of Onset     Peripheral vascular disease Mother      Heart attack Mother      Heart attack Father      PASSED IN HIS 70'S     Rectal cancer Father         Social History   Substance Use Topics     Smoking status: Never Smoker     Smokeless tobacco: Never Used     Alcohol use None        Physical Exam   General Appearance:   Healthy appearing woman who is overweight.  Today's blood pressure is 146/88.    Visit Vitals     Pulse 80     Ht 5' 7.25\" (1.708 m)     Wt (!) 255 lb (115.7 kg)     SpO2 97%     BMI 39.64 kg/m2       EYES: Eyelids, conjunctiva, and sclera were normal. Pupils were normal. Cornea, iris, and lens were normal bilaterally.  HEAD, EARS, NOSE, MOUTH, AND THROAT: Head and face were normal. Hearing was normal to voice and the ears were normal to external exam. Nose appearance was normal and there was no discharge. Oropharynx was normal.  NECK: Neck appearance was normal. There were no neck masses " and the thyroid was not enlarged.  RESPIRATORY: Breathing pattern was normal and the chest moved symmetrically.  Percussion/auscultatory percussion was normal.  Lung sounds were normal and there were no abnormal sounds.  No rales wheezes or rhonchi.  CARDIOVASCULAR: Heart rate and rhythm were normal.  S1 and S2 were normal and there were no extra sounds or murmurs. Peripheral pulses in arms and legs were normal.  Jugular venous pressure was normal.  There was no peripheral edema.  GASTROINTESTINAL: The abdomen was normal in contour.  Bowel sounds were present.  Percussion detected no organ enlargement or tenderness.  Palpation detected no tenderness, mass, or enlarged organs.   MUSCULOSKELETAL: Skeletal configuration was normal and muscle mass was normal for age. Joint appearance was overall normal.  Hammer toes on left foot.  LYMPHATIC: There were no enlarged nodes.  SKIN/HAIR/NAILS: Skin color was normal.  There were no skin lesions.  Hair and nails were normal.  NEUROLOGIC: The patient was alert and oriented to person, place, time, and circumstance. Speech was normal. Cranial nerves were normal. Motor strength was normal for age. The patient was normally coordinated.  PSYCHIATRIC:  Mood and affect were normal and the patient had normal recent and remote memory. The patient's judgment and insight were normal.    ADDITIONAL VITAL SIGNS: Temperature 98.7.  CHEST WALL/BREASTS: Normal female.    RECTAL: Not checked   GENITAL/URINARY: Not checked      Additional Tests   Laboratory: Hemoglobin pending    Radiology: na    Electrocardiogram: na normal EKG 6 months ago.    Total time spent with the patient today was 40 minutes of which > 50% was spent in counseling and coordination of care     Johann Tucker MD  Internal Medicine  Contact me at [phone]

## 2021-06-14 NOTE — PROGRESS NOTES
"OFFICE VISIT NOTE    Subjective:   Chief Complaint:  URI (fever, achey, bloody drainage from nose, fatigue, ear pressure X last Tuesday. leaving for [place] tomorrow)    This a patient has been fighting a respiratory infection for the past 5 days.  Symptoms seem to be worsening and she now has some bloody postnasal drip as well as hoarseness in the morning.  She has facial pressure and pain.  No cough wheezing or pleurisy.  No significant sore throat or any earache.    Current Outpatient Prescriptions   Medication Sig     aspirin 81 MG EC tablet Take 81 mg by mouth daily.     calcium-vitamin D (CALCIUM-VITAMIN D) 500 mg(1,250mg) -200 unit per tablet Take 1 tablet by mouth daily.      levothyroxine (SYNTHROID, LEVOTHROID) 125 MCG tablet TAKE 1 TABLET EVERY DAY     multivitamin therapeutic (THERAGRAN) tablet Take 1 tablet by mouth daily.     OMEGA-3/DHA/EPA/FISH OIL (FISH OIL-OMEGA-3 FATTY ACIDS) 300-1,000 mg capsule Take 2 g by mouth daily.     pravastatin (PRAVACHOL) 20 MG tablet TAKE 1 TABLET EVERY EVENING     RED YEAST RICE ORAL Take by mouth.     azithromycin (ZITHROMAX Z-BRYAN) 250 MG tablet Take 2 tablets (500 mg) on  Day 1,  followed by 1 tablet (250 mg) once daily on Days 2 through 5.       Review of Systems:  A comprehensive review of systems is negative except for the comments above    Objective:    Pulse 77  Temp 98.3  F (36.8  C) (Oral)   Ht 5' 7.25\" (1.708 m)  SpO2 97%  GENERAL: No acute distress.  She is afebrile.  Blood pressure is 146/6.  Pulse 78.  Ears look normal.  Throat shows no evidence of any significant erythema or exudate.  [place] she has some bloody mucopurulent drainage on the right side.  Minor tenderness over the maxillary sinuses.  No tenderness of the frontal sinuses.  Lungs are clear    Assessment & Plan   Qian Lee is a 72 y.o. female.    2 infection complicated by sinusitis.  She is applying to [place] tomorrow.  I have asked her to take Afrin nasal spray before she gets " on the plane and to start a Z-Bryan just in case this worsens.  She will be out of the country for 2 weeks.    Diagnoses and all orders for this visit:    Sinusitis  -     azithromycin (ZITHROMAX Z-BRYAN) 250 MG tablet; Take 2 tablets (500 mg) on  Day 1,  followed by 1 tablet (250 mg) once daily on Days 2 through 5.  Dispense: 6 tablet; Refill: 0    Laryngitis        Johann Tucker MD  Transcription using voice recognition software, may contain typographical errors.

## 2021-06-15 NOTE — PROGRESS NOTES
"OFFICE VISIT NOTE    Subjective:   Chief Complaint:  Cough (wheezing 1 week 2 days prior to her cruise ending)    82-year-old woman was developed a respiratory infection the past 3 or 4 days.  At this time she has a cough and some wheezing.  No fevers or pleurisy.  No shortness of breath.  No complaints of sore throat or earache.  No GI symptoms.    Current Outpatient Prescriptions   Medication Sig     aspirin 81 MG EC tablet Take 81 mg by mouth daily.     calcium-vitamin D (CALCIUM-VITAMIN D) 500 mg(1,250mg) -200 unit per tablet Take 1 tablet by mouth daily.      levothyroxine (SYNTHROID, LEVOTHROID) 125 MCG tablet TAKE 1 TABLET EVERY DAY     multivitamin therapeutic (THERAGRAN) tablet Take 1 tablet by mouth daily.     OMEGA-3/DHA/EPA/FISH OIL (FISH OIL-OMEGA-3 FATTY ACIDS) 300-1,000 mg capsule Take 2 g by mouth daily.     pravastatin (PRAVACHOL) 20 MG tablet TAKE 1 TABLET EVERY EVENING     RED YEAST RICE ORAL Take by mouth.     benzonatate (TESSALON PERLES) 100 MG capsule Take 1 capsule (100 mg total) by mouth 3 (three) times a day as needed for cough.     methylPREDNISolone (MEDROL DOSEPACK) 4 mg tablet follow package directions       Review of Systems:  A comprehensive review of systems is negative except for the comments above    Objective:    Pulse 63  Temp 98  F (36.7  C) (Oral)   Ht 5' 7.25\" (1.708 m)  Wt (!) 255 lb (115.7 kg)  SpO2 98%  BMI 39.64 kg/m2  GENERAL: No acute distress.  She is afebrile.  She has no respiratory difficulties.  Pulse is 65 and regular.  ENT examination is normal without signs of infection.  Lungs have rhonchi.  They do clear with coughing.  She has some slight wheezing especially in the right lung.  No rales are heard.  No signs of any consolidation.  No pleural rubs.    Assessment & Plan   Qian Lee is a 72 y.o. female.    Probable viral bronchitis with some associated wheezing.  Will try combination Tessalon Perles as needed for cough as well as a Medrol Dosepak " which should help with her wheezing.  She will call if she is not improving.  She is going to spend 3 or 4 weeks in [place], leaving in about 5 days.    Diagnoses and all orders for this visit:    Viral bronchitis  -     benzonatate (TESSALON PERLES) 100 MG capsule; Take 1 capsule (100 mg total) by mouth 3 (three) times a day as needed for cough.  Dispense: 30 capsule; Refill: 0  -     methylPREDNISolone (MEDROL DOSEPACK) 4 mg tablet; follow package directions  Dispense: 21 tablet; Refill: 0        Johann Tucker MD  Transcription using voice recognition software, may contain typographical errors.

## 2021-06-17 NOTE — PATIENT INSTRUCTIONS - HE
Patient Instructions by Johann Tucker MD at 5/7/2019 10:00 AM     Author: Johann Tucker MD Service: -- Author Type: Physician    Filed: 5/7/2019 10:09 AM Encounter Date: 5/7/2019 Status: Signed    : Johann Tucker MD (Physician)         Patient Education     Exercise for a Healthier Heart  You may wonder how you can improve the health of your heart. If youre thinking about exercise, youre on the right track. You dont need to become an athlete, but you do need a certain amount of brisk exercise to help strengthen your heart. If you have been diagnosed with a heart condition, your doctor may recommend exercise to help stabilize your condition. To help make exercise a habit, choose safe, fun activities.       Be sure to check with your health care provider before starting an exercise program.    Why exercise?  Exercising regularly offers many healthy rewards. It can help you do all of the following:    Improve your blood cholesterol levels to help prevent further heart trouble    Lower your blood pressure to help prevent a stroke or heart attack    Control diabetes, or reduce your risk of getting this disease    Improve your heart and lung function    Reach and maintain a healthy weight    Make your muscles stronger and more limber so you can stay active    Prevent falls and fractures by slowing the loss of bone mass (osteoporosis)    Manage stress better  Exercise tips  Ease into your routine. Set small goals. Then build on them.  Exercise on most days. Aim for a total of 150 or more minutes of moderate to  vigorous intensity activity each week. Consider 40 minutes, 3 to 4 times a week. For best results, activity should last for 40 minutes on average. It is OK to work up to the 40 minute period over time. Examples of moderate-intensity activity is walking one mile in 15 minutes or 30 to 45 minutes of yard work.  Step up your daily activity level. Along with your exercise program, try being more  active throughout the day. Walk instead of drive. Do more household tasks or yard work.  Choose one or more activities you enjoy. Walking is one of the easiest things you can do. You can also try swimming, riding a bike, or taking an exercise class.  Stop exercising and call your doctor if you:    Have chest pain or feel dizzy or lightheaded    Feel burning, tightness, pressure, or heaviness in your chest, neck, shoulders, back, or arms    Have unusual shortness of breath    Have increased joint or muscle pain    Have palpitations or an irregular heartbeat      4441-6705 [employer]. [address] [place] [address]. All rights reserved. This information is not intended as a substitute for professional medical care. Always follow your healthcare professional's instructions.         Patient Education   Understanding [employer] MyPlate  The USDA (US Department of Agriculture) has guidelines to help you make healthy food choices. These are called MyPlate. MyPlate shows the food groups that make up healthy meals using the image of a place setting. Before you eat, think about the healthiest choices for what to put onto your plate or into your cup or bowl. To learn more about building a healthy plate, visit www.choosemyplate.gov.       The Food Groups    Fruits: Any fruit or 100% fruit juice counts as part of the Fruit Group. Fruits may be fresh, canned, frozen, or dried, and may be whole, cut-up, or pureed. Make half your plate fruits and vegetables.    Vegetables: Any vegetable or 100% vegetable juice counts as a member of the Vegetable Group. Vegetables may be fresh, frozen, canned, or dried. They can be served raw or cooked and may be whole, cut-up, or mashed. Make half your plate fruits and vegetables.     Grains: All foods made from grains are part of the Grains Group. These include wheat, rice, oats, cornmeal, and barley such as bread, pasta, oatmeal, cereal, tortillas, and grits. Grains should be  no more than a quarter of your plate. At least half of your grains should be whole grains.    Protein: This group includes meat, poultry, seafood, beans and peas, eggs, processed soy products (like tofu), nuts (including nut butters), and seeds. Make protein choices no more than a quarter of your plate. Meat and poultry choices should be lean or low fat.    Dairy: All fluid milk products and foods made from milk that contain calcium, like yogurt and cheese are part of the Dairy Group. (Foods that have little calcium, such as cream, butter, and cream cheese, are not part of the group.) Most dairy choices should be low-fat or fat-free.    Oils: These are fats that are liquid at room temperature. They include canola, corn, olive, soybean, and sunflower oil. Foods that are mainly oil include mayonnaise, certain salad dressings, and soft margarines. You should have only 5 to 7 teaspoons of oils a day. You probably already get this much from the food you eat.  Use [employer] to Help Build Your Meals  The [employer] can help you plan and track your meals and activity. You can look up individual foods to see or compare their nutritional value. You can get guidelines for what and how much you should eat. You can compare your food choices. And you can assess personal physical activities and see ways you can improve. Go to www.[employer].gov/supertracker/.    4380-5563 [employer]. [address] [place] [address]. All rights reserved. This information is not intended as a substitute for professional medical care. Always follow your healthcare professional's instructions.           Patient Education   Urinary Incontinence, Female (Adult)  Urinary incontinence means loss of control of the bladder. This problem affects many women, especially as they get older. If you have incontinence, you may be embarrassed to ask for help. But know that this problem can be treated.  Types of Incontinence  There  are different types of incontinence. Two of the main types are described here. You can have more than one type.    Stress incontinence. With this type, urine leaks when pressure (stress) is put on the bladder. This may happen when you cough, sneeze, or laugh. Stress incontinence most often occurs because the pelvic floor muscles that support the bladder and urethra are weak. This can happen after pregnancy and vaginal childbirth or a hysterectomy. It can also be due to excess body weight or hormone changes.    Urge incontinence (also called overactive bladder). With this type, a sudden urge to urinate is felt often. This may happen even though there may not be much urine in the bladder. The need to urinate often during the night is common. Urge incontinence most often occurs because of bladder spasms. This may be due to bladder irritation or infection. Damage to bladder nerves or pelvic muscles, constipation, and certain medicines can also lead to urge incontinence.  Treatment of urinary incontinence depends on the cause. Further evaluation is needed to find the type you have. This will likely include an exam and certain tests. Based on the results, you and your healthcare provider can then plan treatment. Until a diagnosis is made, the home care tips below can help relieve symptoms.  Home care    Do pelvic floor muscle exercises, if they are prescribed. The pelvic floor muscles help support the bladder and urethra. Many women find that their symptoms improve when doing special exercises that strengthen these muscles. To do the exercises contract the muscles you would use to stop your stream of urine, but do this when youre not urinating. Hold for 10 seconds, then relax. Repeat 10 to 20 times in a row, at least 3 times a day. Your provider may give you other instructions for how to do the exercises and how often.    Keep a bladder diary. This helps track how often and how much you urinate over a set period of time.  Bring this diary with you to your next visit with the provider. The information can help your provider learn more about your bladder problem.    Lose weight, if advised to by your provider. Excess weight puts pressure on the bladder. Your provider can help you create a weight-loss plan thats right for you. This may include exercising more and making certain diet changes.    Don't consume foods and drinks that may irritate the bladder. These can include alcohol and caffeinated drinks.    Quit smoking. Smoking and other tobacco use can lead to chronic cough that strains the pelvic floor muscles. Smoking may also damage the bladder and urethra. Talk with your provider about treatments or methods you can use to quit smoking.    If drinking large amounts of fluid causes you to have symptoms, you may be advised to limit your fluid intake. You may also be advised to drink most of your fluids during the day and to limit fluids at night.    If youre worried about urine leakage or accidents, you may wear absorbent pads to catch urine. Change the pads often. This helps reduce discomfort. It may also reduce the risk of skin or bladder infections.  Follow-up care  Follow up with your healthcare provider, or as directed. It may take some to find the right treatment for your problem. Your treatment plan may include special therapies or medicines. Certain procedures or surgery may also be options. Be sure to discuss any questions you have with your provider.  When to seek medical advice  Call the healthcare provider right away if any of these occur:    Fever of 100.4 F (38 C) or higher, or as directed by your provider    Bladder pain or fullness    Abdominal swelling    Nausea or vomiting    Back pain    Weakness, dizziness or fainting  Date Last Reviewed: 10/1/2017    2738-3282 [employer]. [address] [place] [address], [place] [address]. All rights reserved. This information is not intended as a substitute for  professional medical care. Always follow your healthcare professional's instructions.       Advance Directive  Patients advance directive was discussed and I am comfortable with the patients wishes.  Patient Education   Personalized Prevention Plan  You are due for the preventive services outlined below.  Your care team is available to assist you in scheduling these services.  If you have already completed any of these items, please share that information with your care team to update in your medical record.  Health Maintenance   Topic Date Due   ? ZOSTER VACCINES (3 of 3) 04/16/2019   ? FALL RISK ASSESSMENT  04/09/2020   ? DXA SCAN  04/27/2020   ? MAMMOGRAM  04/30/2021   ? ADVANCE DIRECTIVES DISCUSSED WITH PATIENT  04/16/2023   ? COLONOSCOPY  07/02/2024   ? TD 18+ HE  03/06/2027   ? PNEUMOCOCCAL POLYSACCHARIDE VACCINE AGE 65 AND OVER  Completed   ? INFLUENZA VACCINE RULE BASED  Completed   ? PNEUMOCOCCAL CONJUGATE VACCINE FOR ADULTS (PCV13 OR PREVNAR)  Completed

## 2021-06-17 NOTE — PROGRESS NOTES
Assessment and Plan:   Wellness exam.  She is retired but stays quite active.  Except for some minor left hip pain she is doing well.    1. Routine general medical examination at a health care facility    - Urinalysis-UC if Indicated    2. Hypothyroidism due to acquired atrophy of thyroid  Takes thyroid daily.  Check TSH today  - Thyroid Cascade    3. Mixed hyperlipidemia  Takes both red yeast rice and Pravachol.  Lipids will be measured today.  She reports no cardiac symptoms.    4. Obesity (BMI 35.0-39.9 without comorbidity)  She is trying to lose weight this summer.  - Lipid Cascade    5. Primary osteoarthritis involving multiple joints  Both knees have been surgically replaced.  She is doing well with her knees.    6. Medication management  No trouble with medication  - HM1(CBC and Differential)  - Comprehensive Metabolic Panel  - HM1 (CBC with Diff)    7. Postmenopausal  Due for mammogram and bone density.  - DXA Bone Density Scan; Future        The patient's current medical problems were reviewed.    I have had an Advance Directives discussion with the patient.  The following health maintenance schedule was reviewed with the patient and provided in printed form in the after visit summary:   Health Maintenance   Topic Date Due     DXA SCAN  09/06/2011     FALL RISK ASSESSMENT  12/04/2018     MAMMOGRAM  04/18/2019     ADVANCE DIRECTIVES DISCUSSED WITH PATIENT  07/19/2021     COLONOSCOPY  07/02/2024     TD 18+ HE  03/06/2027     PNEUMOCOCCAL POLYSACCHARIDE VACCINE AGE 65 AND OVER  Completed     INFLUENZA VACCINE RULE BASED  Completed     PNEUMOCOCCAL CONJUGATE VACCINE FOR ADULTS (PCV13 OR PREVNAR)  Completed     ZOSTER VACCINE  Completed        Subjective:   Chief Complaint: Qian Lee is an 72 y.o. female here for an Annual Wellness visit.   HPI: 72-year-old woman for annual wellness exam.  She continues to feel well.  Complains of some minor left lateral hip pain.  No cardiac symptoms.  No recent  significant infections.  No chest pain or any orthopnea  No cough wheezing or asthma.  No hayfever  No significant heart burn.  No ulcer symptoms no melena no dysphasia  Denies dysuria or hematuria.  Minor urgency.  Takes thyroid daily.  No history of diabetes.  No heat or cold intolerance.  No stroke symptoms no epilepsy.  No numbness in the hands or feet.    Review of Systems:    Please see above.  The rest of the review of systems are negative for all systems.    Patient Care Team:  Johann Tucker MD as PCP - General (Internal Medicine)     Patient Active Problem List   Diagnosis     Hypothyroidism     Hyperlipidemia     Obesity (BMI 35.0-39.9 without comorbidity)     Osteoarthritis of multiple joints     SNHL (sensorineural hearing loss)     Tinnitus of both ears     Past Medical History:   Diagnosis Date     Elevated triglycerides with high cholesterol      Obesity      Osteoarthritis      Primary hypothyroidism       Past Surgical History:   Procedure Laterality Date     HYSTERECTOMY Bilateral 1995     TOTAL KNEE ARTHROPLASTY Bilateral 2008      Family History   Problem Relation Age of Onset     Peripheral vascular disease Mother      Heart attack Mother      Heart attack Father      PASSED IN HIS 70'S     Rectal cancer Father       Social History     Social History     Marital status: Single     Spouse name: N/A     Number of children: N/A     Years of education: N/A     Occupational History     Not on file.     Social History Main Topics     Smoking status: Never Smoker     Smokeless tobacco: Never Used     Alcohol use Not on file     Drug use: Not on file     Sexual activity: Not on file     Other Topics Concern     Not on file     Social History Narrative    , 2 CHILDREN, RETIRED      Current Outpatient Prescriptions   Medication Sig Dispense Refill     aspirin 81 MG EC tablet Take 81 mg by mouth daily.       calcium-vitamin D (CALCIUM-VITAMIN D) 500 mg(1,250mg) -200 unit per tablet Take 1 tablet  "by mouth daily.        levothyroxine (SYNTHROID, LEVOTHROID) 125 MCG tablet TAKE 1 TABLET EVERY DAY 90 tablet 0     multivitamin therapeutic (THERAGRAN) tablet Take 1 tablet by mouth daily.       OMEGA-3/DHA/EPA/FISH OIL (FISH OIL-OMEGA-3 FATTY ACIDS) 300-1,000 mg capsule Take 2 g by mouth daily.       pravastatin (PRAVACHOL) 20 MG tablet TAKE 1 TABLET EVERY EVENING 90 tablet 3     RED YEAST RICE ORAL Take by mouth.       No current facility-administered medications for this visit.       Objective:   Vital Signs:   Visit Vitals     Pulse 66     Ht 5' 7.25\" (1.708 m)     Wt (!) 257 lb (116.6 kg)     SpO2 95%     BMI 39.95 kg/m2        VisionScreening:  No exam data present     PHYSICAL EXAM  Blood pressure 144/86  Pulse 68  EYES: Eyelids, conjunctiva, and sclera were normal. Pupils were normal. Cornea, iris, and lens were normal bilaterally.  HEAD, EARS, NOSE, MOUTH, AND THROAT: Head and face were normal. Hearing was normal to voice and the ears were normal to external exam. Nose appearance was normal and there was no discharge. Oropharynx was normal.  NECK: Neck appearance was normal. There were no neck masses and the thyroid was not enlarged.  RESPIRATORY: Breathing pattern was normal and the chest moved symmetrically.  Percussion/auscultatory percussion was normal.  Lung sounds were normal and there were no abnormal sounds.  CARDIOVASCULAR: Heart rate and rhythm were normal.  S1 and S2 were normal and there were no extra sounds or murmurs. Peripheral pulses in arms and legs were normal.  Jugular venous pressure was normal.  There was no peripheral edema.  GASTROINTESTINAL: The abdomen was obese in contour.  Bowel sounds were present.  Percussion detected no organ enlargement or tenderness.  Palpation detected no tenderness, mass, or enlarged organs.   MUSCULOSKELETAL: Skeletal configuration was normal and muscle mass was normal for age. Joint appearance was overall normal.  Both knees surgically " replaced.  LYMPHATIC: There were no enlarged nodes.  SKIN/HAIR/NAILS: Skin color was normal.  There were no skin lesions.  Hair and nails were normal.  NEUROLOGIC: The patient was alert and oriented to person, place, time, and circumstance. Speech was normal. Cranial nerves were normal. Motor strength was normal for age. The patient was normally coordinated.  PSYCHIATRIC:  Mood and affect were normal and the patient had normal recent and remote memory. The patient's judgment and insight were normal.    ADDITIONAL VITAL SIGNS: Oxygen saturation 98%  CHEST WALL/BREASTS: No visible or palpable masses  RECTAL: Not checked.  Colonoscopy due in 1 year.  GENITAL/URINARY: Not checked.  Hysterectomy years ago.        Assessment Results 4/16/2018   Activities of Daily Living No help needed   Instrumental Activities of Daily Living No help needed   Get Up and Go Score Less than 12 seconds   Mini Cog Total Score 5   Some recent data might be hidden     A Mini-Cog score of 0-2 suggests the possibility of dementia, score of 3-5 suggests no dementia    Identified Health Risks:     She is at risk for lack of exercise and has been provided with information to increase physical activity for the benefit of her well-being.  The patient was counseled and encouraged to consider modifying their diet and eating habits. She was provided with information on recommended healthy diet options.  The patient was provided with written information regarding signs of hearing loss.  Patient's advanced directive was discussed and I am comfortable with the patient's wishes.

## 2021-06-18 NOTE — PATIENT INSTRUCTIONS - HE
Patient Instructions by Johann Tucker MD at 7/21/2020 10:40 AM     Author: Johann Tucker MD Service: -- Author Type: Physician    Filed: 7/21/2020 10:49 AM Encounter Date: 7/21/2020 Status: Signed    : Johann Tucker MD (Physician)         Patient Education     Exercise for a Healthier Heart  You may wonder how you can improve the health of your heart. If youre thinking about exercise, youre on the right track. You dont need to become an athlete, but you do need a certain amount of brisk exercise to help strengthen your heart. If you have been diagnosed with a heart condition, your doctor may recommend exercise to help stabilize your condition. To help make exercise a habit, choose safe, fun activities.       Be sure to check with your health care provider before starting an exercise program.    Why exercise?  Exercising regularly offers many healthy rewards. It can help you do all of the following:    Improve your blood cholesterol levels to help prevent further heart trouble    Lower your blood pressure to help prevent a stroke or heart attack    Control diabetes, or reduce your risk of getting this disease    Improve your heart and lung function    Reach and maintain a healthy weight    Make your muscles stronger and more limber so you can stay active    Prevent falls and fractures by slowing the loss of bone mass (osteoporosis)    Manage stress better  Exercise tips  Ease into your routine. Set small goals. Then build on them.  Exercise on most days. Aim for a total of 150 or more minutes of moderate to  vigorous intensity activity each week. Consider 40 minutes, 3 to 4 times a week. For best results, activity should last for 40 minutes on average. It is OK to work up to the 40 minute period over time. Examples of moderate-intensity activity is walking one mile in 15 minutes or 30 to 45 minutes of yard work.  Step up your daily activity level. Along with your exercise program, try being more  active throughout the day. Walk instead of drive. Do more household tasks or yard work.  Choose one or more activities you enjoy. Walking is one of the easiest things you can do. You can also try swimming, riding a bike, or taking an exercise class.  Stop exercising and call your doctor if you:    Have chest pain or feel dizzy or lightheaded    Feel burning, tightness, pressure, or heaviness in your chest, neck, shoulders, back, or arms    Have unusual shortness of breath    Have increased joint or muscle pain    Have palpitations or an irregular heartbeat      2177-6292 [employer]. [address] [place] [address]. All rights reserved. This information is not intended as a substitute for professional medical care. Always follow your healthcare professional's instructions.         Patient Education   Understanding [employer] MyPlate  The USDA (US Department of Agriculture) has guidelines to help you make healthy food choices. These are called MyPlate. MyPlate shows the food groups that make up healthy meals using the image of a place setting. Before you eat, think about the healthiest choices for what to put onto your plate or into your cup or bowl. To learn more about building a healthy plate, visit www.choosemyplate.gov.       The Food Groups    Fruits: Any fruit or 100% fruit juice counts as part of the Fruit Group. Fruits may be fresh, canned, frozen, or dried, and may be whole, cut-up, or pureed. Make half your plate fruits and vegetables.    Vegetables: Any vegetable or 100% vegetable juice counts as a member of the Vegetable Group. Vegetables may be fresh, frozen, canned, or dried. They can be served raw or cooked and may be whole, cut-up, or mashed. Make half your plate fruits and vegetables.     Grains: All foods made from grains are part of the Grains Group. These include wheat, rice, oats, cornmeal, and barley such as bread, pasta, oatmeal, cereal, tortillas, and grits. Grains should be  no more than a quarter of your plate. At least half of your grains should be whole grains.    Protein: This group includes meat, poultry, seafood, beans and peas, eggs, processed soy products (like tofu), nuts (including nut butters), and seeds. Make protein choices no more than a quarter of your plate. Meat and poultry choices should be lean or low fat.    Dairy: All fluid milk products and foods made from milk that contain calcium, like yogurt and cheese are part of the Dairy Group. (Foods that have little calcium, such as cream, butter, and cream cheese, are not part of the group.) Most dairy choices should be low-fat or fat-free.    Oils: These are fats that are liquid at room temperature. They include canola, corn, olive, soybean, and sunflower oil. Foods that are mainly oil include mayonnaise, certain salad dressings, and soft margarines. You should have only 5 to 7 teaspoons of oils a day. You probably already get this much from the food you eat.  Use [employer] to Help Build Your Meals  The [employer] can help you plan and track your meals and activity. You can look up individual foods to see or compare their nutritional value. You can get guidelines for what and how much you should eat. You can compare your food choices. And you can assess personal physical activities and see ways you can improve. Go to www.opentabs.gov/supertracker/.    4945-7800 [employer]. [address] [place] [address]. All rights reserved. This information is not intended as a substitute for professional medical care. Always follow your healthcare professional's instructions.           Patient Education   Urinary Incontinence, Female (Adult)  Urinary incontinence means loss of control of the bladder. This problem affects many women, especially as they get older. If you have incontinence, you may be embarrassed to ask for help. But know that this problem can be treated.  Types of Incontinence  There  are different types of incontinence. Two of the main types are described here. You can have more than one type.    Stress incontinence. With this type, urine leaks when pressure (stress) is put on the bladder. This may happen when you cough, sneeze, or laugh. Stress incontinence most often occurs because the pelvic floor muscles that support the bladder and urethra are weak. This can happen after pregnancy and vaginal childbirth or a hysterectomy. It can also be due to excess body weight or hormone changes.    Urge incontinence (also called overactive bladder). With this type, a sudden urge to urinate is felt often. This may happen even though there may not be much urine in the bladder. The need to urinate often during the night is common. Urge incontinence most often occurs because of bladder spasms. This may be due to bladder irritation or infection. Damage to bladder nerves or pelvic muscles, constipation, and certain medicines can also lead to urge incontinence.  Treatment of urinary incontinence depends on the cause. Further evaluation is needed to find the type you have. This will likely include an exam and certain tests. Based on the results, you and your healthcare provider can then plan treatment. Until a diagnosis is made, the home care tips below can help relieve symptoms.  Home care    Do pelvic floor muscle exercises, if they are prescribed. The pelvic floor muscles help support the bladder and urethra. Many women find that their symptoms improve when doing special exercises that strengthen these muscles. To do the exercises contract the muscles you would use to stop your stream of urine, but do this when youre not urinating. Hold for 10 seconds, then relax. Repeat 10 to 20 times in a row, at least 3 times a day. Your provider may give you other instructions for how to do the exercises and how often.    Keep a bladder diary. This helps track how often and how much you urinate over a set period of time.  Bring this diary with you to your next visit with the provider. The information can help your provider learn more about your bladder problem.    Lose weight, if advised to by your provider. Excess weight puts pressure on the bladder. Your provider can help you create a weight-loss plan thats right for you. This may include exercising more and making certain diet changes.    Don't consume foods and drinks that may irritate the bladder. These can include alcohol and caffeinated drinks.    Quit smoking. Smoking and other tobacco use can lead to chronic cough that strains the pelvic floor muscles. Smoking may also damage the bladder and urethra. Talk with your provider about treatments or methods you can use to quit smoking.    If drinking large amounts of fluid causes you to have symptoms, you may be advised to limit your fluid intake. You may also be advised to drink most of your fluids during the day and to limit fluids at night.    If youre worried about urine leakage or accidents, you may wear absorbent pads to catch urine. Change the pads often. This helps reduce discomfort. It may also reduce the risk of skin or bladder infections.  Follow-up care  Follow up with your healthcare provider, or as directed. It may take some to find the right treatment for your problem. Your treatment plan may include special therapies or medicines. Certain procedures or surgery may also be options. Be sure to discuss any questions you have with your provider.  When to seek medical advice  Call the healthcare provider right away if any of these occur:    Fever of 100.4 F (38 C) or higher, or as directed by your provider    Bladder pain or fullness    Abdominal swelling    Nausea or vomiting    Back pain    Weakness, dizziness or fainting  Date Last Reviewed: 10/1/2017    9908-4664 [employer]. [address] [place] [address], [place] [address]. All rights reserved. This information is not intended as a substitute for  professional medical care. Always follow your healthcare professional's instructions.       Advance Directive  Patients advance directive was discussed and I am comfortable with the patients wishes.  Patient Education   Personalized Prevention Plan  You are due for the preventive services outlined below.  Your care team is available to assist you in scheduling these services.  If you have already completed any of these items, please share that information with your care team to update in your medical record.  Health Maintenance   Topic Date Due   ? HEPATITIS C SCREENING  1945   ? PNEUMOCOCCAL IMMUNIZATION 65+ LOW/MEDIUM RISK (2 of 2 - PPSV23) 02/24/2016   ? ZOSTER VACCINES (3 of 3) 04/16/2019   ? DXA SCAN  04/27/2020   ? MEDICARE ANNUAL WELLNESS VISIT  05/07/2020   ? FALL RISK ASSESSMENT  05/07/2020   ? INFLUENZA VACCINE RULE BASED (1) 08/01/2020   ? MAMMOGRAM  04/30/2021   ? LIPID  05/07/2024   ? ADVANCE CARE PLANNING  05/07/2024   ? TD 18+ HE  03/06/2027   ? COLORECTAL CANCER SCREENING  11/13/2029   ? HEPATITIS B VACCINES  Aged Out

## 2021-06-19 NOTE — LETTER
Letter by Johann Tucker MD at      Author: Johann Tucker MD Service: -- Author Type: --    Filed:  Encounter Date: 5/8/2019 Status: (Other)         Qian Lee  [address] [facility] [address]             May 8, 2019         Dear Ms. Lee,    Below are the results from your recent visit:    Resulted Orders   Comprehensive Metabolic Panel   Result Value Ref Range    Sodium 141 136 - 145 mmol/L    Potassium 5.0 3.5 - 5.0 mmol/L    Chloride 106 98 - 107 mmol/L    CO2 24 22 - 31 mmol/L    Anion Gap, Calculation 11 5 - 18 mmol/L    Glucose 97 70 - 125 mg/dL    BUN 12 8 - 28 mg/dL    Creatinine 0.85 0.60 - 1.10 mg/dL    GFR MDRD Af Amer >60 >60 mL/min/1.73m2    GFR MDRD Non Af Amer >60 >60 mL/min/1.73m2    Bilirubin, Total 0.4 0.0 - 1.0 mg/dL    Calcium 10.2 8.5 - 10.5 mg/dL    Protein, Total 7.5 6.0 - 8.0 g/dL    Albumin 4.1 3.5 - 5.0 g/dL    Alkaline Phosphatase 49 45 - 120 U/L    AST 38 0 - 40 U/L    ALT 39 0 - 45 U/L    Narrative    Fasting Glucose reference range is 70-99 mg/dL per  American Diabetes Association (ADA) guidelines.   Lipid Cascade   Result Value Ref Range    Cholesterol 184 <=199 mg/dL    Triglycerides 146 <=149 mg/dL    HDL Cholesterol 60 >=50 mg/dL    LDL Calculated 95 <=129 mg/dL    Patient Fasting > 8hrs? Unknown    Thyroid Cascade   Result Value Ref Range    TSH 1.45 0.30 - 5.00 uIU/mL   Urinalysis-UC if Indicated   Result Value Ref Range    Color, UA Yellow Colorless, Yellow, Straw, Light Yellow    Clarity, UA Clear Clear    Glucose, UA Negative Negative    Bilirubin, UA Negative Negative    Ketones, UA Negative Negative    Specific Gravity, UA 1.020 1.005 - 1.030    Blood, UA Negative Negative    pH, UA 6.0 5.0 - 8.0    Protein, UA Negative Negative mg/dL    Urobilinogen, UA 0.2 E.U./dL 0.2 E.U./dL, 1.0 E.U./dL    Nitrite, UA Negative Negative    Leukocytes, UA Negative Negative    Narrative    Microscopic not indicated  UC not indicated   HM1 (CBC with Diff)    Result Value Ref Range    WBC 4.1 4.0 - 11.0 thou/uL    RBC 4.98 3.80 - 5.40 mill/uL    Hemoglobin 14.9 12.0 - 16.0 g/dL    Hematocrit 46.1 35.0 - 47.0 %    MCV 93 80 - 100 fL    MCH 29.9 27.0 - 34.0 pg    MCHC 32.3 32.0 - 36.0 g/dL    RDW 13.0 11.0 - 14.5 %    Platelets 163 140 - 440 thou/uL    MPV 10.9 8.5 - 12.5 fL    Neutrophils % 48 (L) 50 - 70 %    Lymphocytes % 36 20 - 40 %    Monocytes % 11 (H) 2 - 10 %    Eosinophils % 4 0 - 6 %    Basophils % 1 0 - 2 %    Neutrophils Absolute 2.0 2.0 - 7.7 thou/uL    Lymphocytes Absolute 1.5 0.8 - 4.4 thou/uL    Monocytes Absolute 0.5 0.0 - 0.9 thou/uL    Eosinophils Absolute 0.2 0.0 - 0.4 thou/uL    Basophils Absolute 0.0 0.0 - 0.2 thou/uL       Qian, recent EKG and labs are reviewed.  Your EKG was normal and unchanged.  Electrolytes, blood sugar, kidney functions, and all liver function tests, are normal.  Lipids looked very good.  TSH, a measure of thyroid hormone levels, is in a normal range.  The urinalysis is normal.  Hemoglobin is excellent at 14.9.    Please call with questions or contact us using [employer].    Sincerely,        Electronically signed by Johann Tucker MD

## 2021-06-20 ENCOUNTER — HEALTH MAINTENANCE LETTER (OUTPATIENT)
Age: 76
End: 2021-06-20

## 2021-06-20 NOTE — LETTER
Letter by Johann Tucker MD at      Author: Johann Tucker MD Service: -- Author Type: --    Filed:  Encounter Date: 7/22/2020 Status: (Other)         Qian Lee  [address] [facility] [address]             July 22, 2020         Dear Ms. Lee,    Below are the results from your recent visit:    Resulted Orders   Comprehensive Metabolic Panel   Result Value Ref Range    Sodium 142 136 - 145 mmol/L    Potassium 4.6 3.5 - 5.0 mmol/L    Chloride 104 98 - 107 mmol/L    CO2 29 22 - 31 mmol/L    Anion Gap, Calculation 9 5 - 18 mmol/L    Glucose 109 70 - 125 mg/dL    BUN 14 8 - 28 mg/dL    Creatinine 1.00 0.60 - 1.10 mg/dL    GFR MDRD Af Amer >60 >60 mL/min/1.73m2    GFR MDRD Non Af Amer 54 (L) >60 mL/min/1.73m2    Bilirubin, Total 0.5 0.0 - 1.0 mg/dL    Calcium 10.3 8.5 - 10.5 mg/dL    Protein, Total 7.3 6.0 - 8.0 g/dL    Albumin 4.2 3.5 - 5.0 g/dL    Alkaline Phosphatase 53 45 - 120 U/L    AST 31 0 - 40 U/L    ALT 41 0 - 45 U/L    Narrative    Fasting Glucose reference range is 70-99 mg/dL per  American Diabetes Association (ADA) guidelines.   Lipid Cascade   Result Value Ref Range    Cholesterol 182 <=199 mg/dL    Triglycerides 133 <=149 mg/dL    HDL Cholesterol 63 >=50 mg/dL    LDL Calculated 92 <=129 mg/dL    Patient Fasting > 8hrs? Yes    Thyroid Cascade   Result Value Ref Range    TSH 0.99 0.30 - 5.00 uIU/mL   Urinalysis-UC if Indicated   Result Value Ref Range    Color, UA Yellow Colorless, Yellow, Straw, Light Yellow    Clarity, UA Clear Clear    Glucose, UA Negative Negative    Bilirubin, UA Negative Negative    Ketones, UA Negative Negative    Specific Gravity, UA 1.020 1.005 - 1.030    Blood, UA Negative Negative    pH, UA 5.5 5.0 - 8.0    Protein, UA Negative Negative mg/dL    Urobilinogen, UA 0.2 E.U./dL 0.2 E.U./dL, 1.0 E.U./dL    Nitrite, UA Negative Negative    Leukocytes, UA Negative Negative    Narrative    Microscopic not indicated  UC not indicated   HM1 (CBC with Diff)    Result Value Ref Range    WBC 6.4 4.0 - 11.0 thou/uL    RBC 4.75 3.80 - 5.40 mill/uL    Hemoglobin 14.7 12.0 - 16.0 g/dL    Hematocrit 43.3 35.0 - 47.0 %    MCV 91 80 - 100 fL    MCH 31.0 27.0 - 34.0 pg    MCHC 33.9 32.0 - 36.0 g/dL    RDW 10.9 (L) 11.0 - 14.5 %    Platelets 198 140 - 440 thou/uL    MPV 7.7 7.0 - 10.0 fL    Neutrophils % 55 50 - 70 %    Lymphocytes % 35 20 - 40 %    Monocytes % 7 2 - 10 %    Eosinophils % 3 0 - 6 %    Basophils % 1 0 - 2 %    Neutrophils Absolute 3.5 2.0 - 7.7 thou/uL    Lymphocytes Absolute 2.2 0.8 - 4.4 thou/uL    Monocytes Absolute 0.4 0.0 - 0.9 thou/uL    Eosinophils Absolute 0.2 0.0 - 0.4 thou/uL    Basophils Absolute 0.1 0.0 - 0.2 thou/uL       Qian, recent EKG and labs are reviewed.  EKG looks okay without any signs of arrhythmias or scars on the heart.  No enlargement.  Electrolytes, blood sugar, kidney functions, and all liver function tests, were normal.  Lipids looked excellent.  TSH, the blood test measuring thyroid hormone levels, is in a normal range urine analysis looked good without signs of infection or any blood loss.  Hemoglobin and white blood count were in a normal range.  No abnormality seen.  Overall, labs look very good.  Please call with questions or contact us using [employer].    Sincerely,        Electronically signed by Johann Tucker MD

## 2021-07-13 ENCOUNTER — RECORDS - HEALTHEAST (OUTPATIENT)
Dept: ADMINISTRATIVE | Facility: CLINIC | Age: 76
End: 2021-07-13

## 2021-07-13 DIAGNOSIS — E03.4 HYPOTHYROIDISM DUE TO ACQUIRED ATROPHY OF THYROID: ICD-10-CM

## 2021-07-13 DIAGNOSIS — E78.2 MIXED HYPERLIPIDEMIA: ICD-10-CM

## 2021-07-17 NOTE — TELEPHONE ENCOUNTER
"Routing refill request to provider for review/approval because:  No established PCP    Last Written Prescription Date:  6/11/20  Last Fill Quantity: 90,  # refills: 3   Last office visit provider:  7/21/20     Requested Prescriptions   Pending Prescriptions Disp Refills     levothyroxine (SYNTHROID/LEVOTHROID) 125 MCG tablet [Pharmacy Med Name: L-THYROXINE (SYNTHROID) TABS 125MCG] 90 tablet 3     Sig: TAKE 1 TABLET DAILY (OVERDUE FOR TSH)       Thyroid Protocol Passed - 7/13/2021 11:09 PM        Passed - Patient is 12 years or older        Passed - Recent (12 mo) or future (30 days) visit within the authorizing provider's specialty     Patient has had an office visit with the authorizing provider or a provider within the authorizing providers department within the previous 12 mos or has a future within next 30 days. See \"Patient Info\" tab in inbasket, or \"Choose Columns\" in Meds & Orders section of the refill encounter.              Passed - Medication is active on med list        Passed - Normal TSH on file in past 12 months     Recent Labs   Lab Test 07/21/20  1140   TSH 0.99              Passed - No active pregnancy on record     If patient is pregnant or has had a positive pregnancy test, please check TSH.          Passed - No positive pregnancy test in past 12 months     If patient is pregnant or has had a positive pregnancy test, please check TSH.             pravastatin (PRAVACHOL) 20 MG tablet [Pharmacy Med Name: PRAVASTATIN TABS 20MG] 90 tablet 3     Sig: TAKE 1 TABLET EVERY EVENING       Statins Protocol Failed - 7/13/2021 11:09 PM        Failed - LDL on file in past 12 months     Recent Labs   Lab Test 07/21/20  1140   LDL 92             Passed - No abnormal creatine kinase in past 12 months     No lab results found.             Passed - Recent (12 mo) or future (30 days) visit within the authorizing provider's specialty     Patient has had an office visit with the authorizing provider or a provider " "within the authorizing providers department within the previous 12 mos or has a future within next 30 days. See \"Patient Info\" tab in inbasket, or \"Choose Columns\" in Meds & Orders section of the refill encounter.              Passed - Medication is active on med list        Passed - Patient is age 18 or older        Passed - No active pregnancy on record        Passed - No positive pregnancy test in past 12 months             zamzam ngo RN 07/17/21 11:36 AM  "

## 2021-07-19 RX ORDER — PRAVASTATIN SODIUM 20 MG
TABLET ORAL
Qty: 90 TABLET | Refills: 3 | Status: SHIPPED | OUTPATIENT
Start: 2021-07-19 | End: 2021-07-20

## 2021-07-19 RX ORDER — LEVOTHYROXINE SODIUM 125 UG/1
TABLET ORAL
Qty: 90 TABLET | Refills: 3 | Status: SHIPPED | OUTPATIENT
Start: 2021-07-19 | End: 2022-07-04

## 2021-07-20 DIAGNOSIS — E78.2 MIXED HYPERLIPIDEMIA: ICD-10-CM

## 2021-07-20 RX ORDER — PRAVASTATIN SODIUM 20 MG
TABLET ORAL
Qty: 90 TABLET | Refills: 3 | Status: SHIPPED | OUTPATIENT
Start: 2021-07-20 | End: 2021-08-02

## 2021-07-21 ENCOUNTER — RECORDS - HEALTHEAST (OUTPATIENT)
Dept: ADMINISTRATIVE | Facility: CLINIC | Age: 76
End: 2021-07-21

## 2021-07-29 DIAGNOSIS — E78.2 MIXED HYPERLIPIDEMIA: ICD-10-CM

## 2021-07-29 NOTE — TELEPHONE ENCOUNTER
Pharmacy is requesting clarification to patient's Pravastatin order stating that the patent may have an allergy to it due to having an allergy to HMG CO-A reductase inhibitors. Please send refill if this medication is okay. Thank you    Pending Prescriptions:                       Disp   Refills    pravastatin (PRAVACHOL) 20 MG tablet      90 tab*3            Sig: TAKE 1 TABLET EVERY EVENING

## 2021-08-02 RX ORDER — PRAVASTATIN SODIUM 20 MG
TABLET ORAL
Qty: 90 TABLET | Refills: 3 | Status: SHIPPED | OUTPATIENT
Start: 2021-08-02 | End: 2022-09-30

## 2021-08-30 ENCOUNTER — OFFICE VISIT (OUTPATIENT)
Dept: INTERNAL MEDICINE | Facility: CLINIC | Age: 76
End: 2021-08-30
Payer: COMMERCIAL

## 2021-08-30 VITALS
BODY MASS INDEX: 35.63 KG/M2 | HEART RATE: 56 BPM | OXYGEN SATURATION: 99 % | HEIGHT: 67 IN | WEIGHT: 227 LBS | DIASTOLIC BLOOD PRESSURE: 78 MMHG | SYSTOLIC BLOOD PRESSURE: 168 MMHG

## 2021-08-30 DIAGNOSIS — E78.2 MIXED HYPERLIPIDEMIA: ICD-10-CM

## 2021-08-30 DIAGNOSIS — G89.29 CHRONIC RIGHT SHOULDER PAIN: ICD-10-CM

## 2021-08-30 DIAGNOSIS — H90.3 SENSORINEURAL HEARING LOSS (SNHL) OF BOTH EARS: ICD-10-CM

## 2021-08-30 DIAGNOSIS — E66.9 OBESITY (BMI 35.0-39.9 WITHOUT COMORBIDITY): ICD-10-CM

## 2021-08-30 DIAGNOSIS — M25.511 CHRONIC RIGHT SHOULDER PAIN: ICD-10-CM

## 2021-08-30 DIAGNOSIS — M15.0 PRIMARY OSTEOARTHRITIS INVOLVING MULTIPLE JOINTS: ICD-10-CM

## 2021-08-30 DIAGNOSIS — E03.9 HYPOTHYROIDISM, UNSPECIFIED TYPE: ICD-10-CM

## 2021-08-30 DIAGNOSIS — H93.13 TINNITUS OF BOTH EARS: ICD-10-CM

## 2021-08-30 DIAGNOSIS — Z00.00 ENCOUNTER FOR MEDICARE ANNUAL WELLNESS EXAM: Primary | ICD-10-CM

## 2021-08-30 DIAGNOSIS — R03.0 ELEVATED BLOOD PRESSURE READING WITHOUT DIAGNOSIS OF HYPERTENSION: ICD-10-CM

## 2021-08-30 PROBLEM — Z96.653 HISTORY OF BILATERAL KNEE REPLACEMENT: Status: ACTIVE | Noted: 2021-08-30

## 2021-08-30 LAB
ALBUMIN SERPL-MCNC: 4.2 G/DL (ref 3.5–5)
ALBUMIN UR-MCNC: NEGATIVE MG/DL
ALP SERPL-CCNC: 51 U/L (ref 45–120)
ALT SERPL W P-5'-P-CCNC: 24 U/L (ref 0–45)
ANION GAP SERPL CALCULATED.3IONS-SCNC: 12 MMOL/L (ref 5–18)
APPEARANCE UR: CLEAR
AST SERPL W P-5'-P-CCNC: 22 U/L (ref 0–40)
BILIRUB SERPL-MCNC: 0.7 MG/DL (ref 0–1)
BILIRUB UR QL STRIP: NEGATIVE
BUN SERPL-MCNC: 13 MG/DL (ref 8–28)
CALCIUM SERPL-MCNC: 10.9 MG/DL (ref 8.5–10.5)
CHLORIDE BLD-SCNC: 105 MMOL/L (ref 98–107)
CHOLEST SERPL-MCNC: 165 MG/DL
CO2 SERPL-SCNC: 25 MMOL/L (ref 22–31)
COLOR UR AUTO: YELLOW
CREAT SERPL-MCNC: 0.82 MG/DL (ref 0.6–1.1)
ERYTHROCYTE [DISTWIDTH] IN BLOOD BY AUTOMATED COUNT: 12.3 % (ref 10–15)
FASTING STATUS PATIENT QL REPORTED: YES
GFR SERPL CREATININE-BSD FRML MDRD: 71 ML/MIN/1.73M2
GLUCOSE BLD-MCNC: 97 MG/DL (ref 70–125)
GLUCOSE UR STRIP-MCNC: NEGATIVE MG/DL
HCT VFR BLD AUTO: 43.8 % (ref 35–47)
HDLC SERPL-MCNC: 58 MG/DL
HGB BLD-MCNC: 14.4 G/DL (ref 11.7–15.7)
HGB UR QL STRIP: NEGATIVE
KETONES UR STRIP-MCNC: NEGATIVE MG/DL
LDLC SERPL CALC-MCNC: 79 MG/DL
LEUKOCYTE ESTERASE UR QL STRIP: NEGATIVE
MCH RBC QN AUTO: 29.7 PG (ref 26.5–33)
MCHC RBC AUTO-ENTMCNC: 32.9 G/DL (ref 31.5–36.5)
MCV RBC AUTO: 90 FL (ref 78–100)
NITRATE UR QL: NEGATIVE
PH UR STRIP: 6 [PH] (ref 5–8)
PLATELET # BLD AUTO: 203 10E3/UL (ref 150–450)
POTASSIUM BLD-SCNC: 5 MMOL/L (ref 3.5–5)
PROT SERPL-MCNC: 7.3 G/DL (ref 6–8)
RBC # BLD AUTO: 4.85 10E6/UL (ref 3.8–5.2)
SODIUM SERPL-SCNC: 142 MMOL/L (ref 136–145)
SP GR UR STRIP: 1.02 (ref 1–1.03)
TRIGL SERPL-MCNC: 141 MG/DL
TSH SERPL DL<=0.005 MIU/L-ACNC: 0.62 UIU/ML (ref 0.3–5)
UROBILINOGEN UR STRIP-ACNC: 0.2 E.U./DL
WBC # BLD AUTO: 6 10E3/UL (ref 4–11)

## 2021-08-30 PROCEDURE — 80061 LIPID PANEL: CPT | Performed by: INTERNAL MEDICINE

## 2021-08-30 PROCEDURE — 36415 COLL VENOUS BLD VENIPUNCTURE: CPT | Performed by: INTERNAL MEDICINE

## 2021-08-30 PROCEDURE — 99214 OFFICE O/P EST MOD 30 MIN: CPT | Mod: 25 | Performed by: INTERNAL MEDICINE

## 2021-08-30 PROCEDURE — 80053 COMPREHEN METABOLIC PANEL: CPT | Performed by: INTERNAL MEDICINE

## 2021-08-30 PROCEDURE — G0009 ADMIN PNEUMOCOCCAL VACCINE: HCPCS | Performed by: INTERNAL MEDICINE

## 2021-08-30 PROCEDURE — 85027 COMPLETE CBC AUTOMATED: CPT | Performed by: INTERNAL MEDICINE

## 2021-08-30 PROCEDURE — 84443 ASSAY THYROID STIM HORMONE: CPT | Performed by: INTERNAL MEDICINE

## 2021-08-30 PROCEDURE — 99397 PER PM REEVAL EST PAT 65+ YR: CPT | Mod: 25 | Performed by: INTERNAL MEDICINE

## 2021-08-30 PROCEDURE — 90732 PPSV23 VACC 2 YRS+ SUBQ/IM: CPT | Performed by: INTERNAL MEDICINE

## 2021-08-30 PROCEDURE — 81003 URINALYSIS AUTO W/O SCOPE: CPT | Performed by: INTERNAL MEDICINE

## 2021-08-30 RX ORDER — CHLORAL HYDRATE 500 MG
1 CAPSULE ORAL DAILY
COMMUNITY

## 2021-08-30 RX ORDER — OXYBUTYNIN CHLORIDE 5 MG/1
1 TABLET, EXTENDED RELEASE ORAL DAILY
COMMUNITY
Start: 2021-08-14 | End: 2021-08-30

## 2021-08-30 ASSESSMENT — MIFFLIN-ST. JEOR: SCORE: 1562.3

## 2021-08-30 ASSESSMENT — ACTIVITIES OF DAILY LIVING (ADL): CURRENT_FUNCTION: NO ASSISTANCE NEEDED

## 2021-08-30 NOTE — PATIENT INSTRUCTIONS
Annual flu shot every fall    Pneumovax 23 is being given today    Colonoscopy should be repeated in November 2022    Continue to see your eye doctor every year    Annual visit with your dermatologist for total-body skin exam    Continue to get an annual mammogram    Consider DEXA in 2023 for osteoporosis screening.  Her last DEXA was normal in 2018    You should still try to get 1500 mg of calcium daily with diet and supplements    Recheck your blood pressure at home if possible and return in 10 days to have it rechecked    Restrict your sodium intake with a goal of less than 2000 mg daily      Patient Education   Personalized Prevention Plan  You are due for the preventive services outlined below.  Your care team is available to assist you in scheduling these services.  If you have already completed any of these items, please share that information with your care team to update in your medical record.  Health Maintenance Due   Topic Date Due     ANNUAL REVIEW OF HM ORDERS  Never done     Pneumococcal Vaccine (2 of 2 - PPSV23) 02/24/2016     Flu Vaccine (1) 09/01/2021       Understanding USDA MyPlate  The USDA has guidelines to help you make healthy food choices. These are called MyPlate. MyPlate shows the food groups that make up healthy meals using the image of a place setting. Before you eat, think about the healthiest choices for what to put on your plate or in your cup or bowl. To learn more about building a healthy plate, visit www.choosemyplate.gov.    The food groups    Fruits. Any fruit or 100% fruit juice counts as part of the Fruit Group. Fruits may be fresh, canned, frozen, or dried, and may be whole, cut-up, or pureed. Make 1/2 of your plate fruits and vegetables.    Vegetables. Any vegetable or 100% vegetable juice counts as a member of the Vegetable Group. Vegetables may be fresh, frozen, canned, or dried. They can be served raw or cooked and may be whole, cut-up, or mashed. Make 1/2 of your plate  fruits and vegetables.    Grains. All foods made from grains are part of the Grains Group. These include wheat, rice, oats, cornmeal, and barley. Grains are often used to make foods such as bread, pasta, oatmeal, cereal, tortillas, and grits. Grains should be no more than 1/4 of your plate. At least half of your grains should be whole grains.    Protein. This group includes meat, poultry, seafood, beans and peas, eggs, processed soy products (such as tofu), nuts (including nut butters), and seeds. Make protein choices no more than 1/4 of your plate. Meat and poultry choices should be lean or low fat.    Dairy. The Dairy Group includes all fluid milk products and foods made from milk that contain calcium, such as yogurt and cheese. (Foods that have little calcium, such as cream, butter, and cream cheese, are not part of this group.) Most dairy choices should be low-fat or fat-free.    Oils. Oils aren't a food group, but they do contain essential nutrients. However it's important to watch your intake of oils. These are fats that are liquid at room temperature. They include canola, corn, olive, soybean, vegetable, and sunflower oil. Foods that are mainly oil include mayonnaise, certain salad dressings, and soft margarines. You likely already get your daily oil allowance from the foods you eat.  Things to limit  Eating healthy also means limiting these things in your diet:       Salt (sodium). Many processed foods have a lot of sodium. To keep sodium intake down, eat fresh vegetables, meats, poultry, and seafood when possible. Purchase low-sodium, reduced-sodium, or no-salt-added food products at the store. And don't add salt to your meals at home. Instead, season them with herbs and spices such as dill, oregano, cumin, and paprika. Or try adding flavor with lemon or lime zest and juice.    Saturated fat. Saturated fats are most often found in animal products such as beef, pork, and chicken. They are often solid at room  temperature, such as butter. To reduce your saturated fat intake, choose leaner cuts of meat and poultry. And try healthier cooking methods such as grilling, broiling, roasting, or baking. For a simple lower-fat swap, use plain nonfat yogurt instead of mayonnaise when making potato salad or macaroni salad.    Added sugars. These are sugars added to foods. They are in foods such as ice cream, candy, soda, fruit drinks, sports drinks, energy drinks, cookies, pastries, jams, and syrups. Cut down on added sugars by sharing sweet treats with a family member or friend. You can also choose fruit for dessert, and drink water or other unsweetened beverages.     [employer] last reviewed this educational content on 6/1/2020 2000-2021 [employer]. All rights reserved. This information is not intended as a substitute for professional medical care. Always follow your healthcare professional's instructions.          Urinary Incontinence, Female (Adult)   Urinary incontinence means loss of bladder control. This problem affects many women, especially as they get older. If you have incontinence, you may be embarrassed to ask for help. But know that this problem can be treated.   Types of Incontinence  There are different types of incontinence. Two of the main types are described here. You can have more than one type.     Stress incontinence. With this type, urine leaks when pressure (stress) is put on the bladder. This may happen when you cough, sneeze, or laugh. Stress incontinence most often occurs because the pelvic floor muscles that support the bladder and urethra are weak. This can happen after pregnancy and vaginal childbirth or a hysterectomy. It can also be due to excess body weight or hormone changes.    Urge incontinence (also called overactive bladder). With this type, a sudden urge to urinate is felt often. This may happen even though there may not be much urine in the bladder. The need to urinate often  during the night is common. Urge incontinence most often occurs because of bladder spasms. This may be due to bladder irritation or infection. Damage to bladder nerves or pelvic muscles, constipation, and certain medicines can also lead to urge incontinence.  Treatment depends on the cause. Further evaluation is needed to find the type you have. This will likely include an exam and certain tests. Based on the results, you and your healthcare provider can then plan treatment. Until a diagnosis is made, the home care tips below can help ease symptoms.   Home care    Do pelvic floor muscle exercises, if they are prescribed. The pelvic floor muscles help support the bladder and urethra. Many women find that their symptoms improve when doing special exercises that strengthen these muscles. To do the exercises, contract the muscles you would use to stop your stream of urine. But do this when you re not urinating. Hold for 10 seconds, then relax. Repeat 10 to 20 times in a row, at least 3 times a day. Your healthcare provider may give you other instructions for how to do the exercises and how often.    Keep a bladder diary. This helps track how often and how much you urinate over a set period of time. Bring this diary with you to your next visit with the provider. The information can help your provider learn more about your bladder problem.    Lose weight, if advised to by your provider. Extra weight puts pressure on the bladder. Your provider can help you create a weight-loss plan that s right for you. This may include exercising more and making certain diet changes.    Don't have foods and drinks that may irritate the bladder. These can include alcohol and caffeinated drinks.    Quit smoking. Smoking and other tobacco use can lead to a long-term (chronic) cough that strains the pelvic floor muscles. Smoking may also damage the bladder and urethra. Talk with your provider about treatments or methods you can use to quit  smoking.    If drinking large amounts of fluid makes you have symptoms, you may be advised to limit your fluid intake. You may also be advised to drink most of your fluids during the day and to limit fluids at night.    If you re worried about urine leakage or accidents, you may wear absorbent pads to catch urine. Change the pads often. This helps reduce discomfort. It may also reduce the risk of skin or bladder infections.    Follow-up care  Follow up with your healthcare provider, or as directed. It may take some to find the right treatment for your problem. But healthy lifestyle changes can be made right away. These include such things as exercising on a regular basis, eating a healthy diet, losing weight (if needed), and quitting smoking. Your treatment plan may include special therapies or medicines. Certain procedures or surgery may also be options. Talk about any questions you have with your provider.   When to seek medical advice  Call the healthcare provider right away if any of these occur:    Fever of 100.4 F (38 C) or higher, or as directed by your provider    Bladder pain or fullness    Belly swelling    Nausea or vomiting    Back pain    Weakness, dizziness, or fainting  [employer] last reviewed this educational content on 1/1/2020 2000-2021 [employer]. All rights reserved. This information is not intended as a substitute for professional medical care. Always follow your healthcare professional's instructions.          Kegel Exercises  Kegel exercises are done to help strengthen the muscles in your pelvic floor. You don t need special clothing or equipment. They re easy to learn and simple to do. And if you do them right, no one can tell you re doing them, so they can be done almost anywhere. Your healthcare provider, nurse, or physical therapist can answer any questions you have and help you get started.   A weak pelvic floor  The pelvic floor muscles may weaken due to aging, pregnancy  and vaginal childbirth, injury, surgery, chronic cough, or lack of exercise. If the pelvic floor is weak, your bladder and other pelvic organs may sag out of place. The urethra may also open too easily and allow urine to leak out. Kegel exercises can help you strengthen your pelvic floor muscles. Then they can better support the pelvic organs and control urine flow.     How Kegel exercises are done  Try each of the Kegel exercises described below. When you re doing them, try not to move your leg, buttock, or stomach muscles:     Contract as if you were stopping your urine stream. But do it when you re not urinating.    Tighten your rectum as if trying not to pass gas. Contract your anus, but don t move your buttocks.    You may place a finger or 2 in the vagina and squeeze your finger with your vagina to learn which muscles to tighten.  Try to hold each Kegel for a slow count to 5. You probably won t be able to hold them for that long at first. But keep practicing. It will get easier as your pelvic floor gets stronger. Eventually, special weights that you place in your vagina may be recommended to help make your Kegels even more effective. Talk to your healthcare provider if you have trouble doing Kegel exercises.   Helpful tips  Here are some tips to follow:    Do your Kegels as often as you can. The more you do them, the faster you ll feel the results.    Pick an activity you do often as a reminder. For instance, do your Kegels every time you sit down.    Tighten your pelvic floor before you sneeze, get up from a chair, cough, laugh, or lift. This can help prevent urine, gas, or stool leakage.  [employer] last reviewed this educational content on 8/1/2020 2000-2021 [employer]. All rights reserved. This information is not intended as a substitute for professional medical care. Always follow your healthcare professional's instructions.

## 2021-08-30 NOTE — PROGRESS NOTES
"SUBJECTIVE:   Qian Lee is a 75 year old female who presents for Preventive Visit.    HPI-in addition to her annual wellness visit, patient is here to establish care with me as her primary care physician and to review chronic medical problems including management of hypothyroidism, hyperlipidemia, and osteoarthritis and to discuss elevated blood pressure reading today.  See assessment and plan for details.      Patient has been advised of split billing requirements and indicates understanding: Yes   Are you in the first 12 months of your Medicare coverage?  No    Healthy Habits:     In general, how would you rate your overall health?  Good    Frequency of exercise:  2-3 days/week    Duration of exercise:  15-30 minutes    Do you usually eat at least 4 servings of fruit and vegetables a day, include whole grains    & fiber and avoid regularly eating high fat or \"junk\" foods?  No    Barriers to taking medications:  None    Medication side effects:  None    Ability to successfully perform activities of daily living:  No assistance needed    Home Safety:  No safety concerns identified    Hearing Impairment:  No hearing concerns    In the past 6 months, have you been bothered by leaking of urine? Yes    In general, how would you rate your overall mental or emotional health?  Excellent      PHQ-2 Total Score: 0    Additional concerns today:  No    Do you feel safe in your environment? Yes    Have you ever done Advance Care Planning? (For example, a Health Directive, POLST, or a discussion with a medical provider or your loved ones about your wishes): Yes, advance care planning is on file.       Fall risk  Fallen 2 or more times in the past year?: No  Any fall with injury in the past year?: No  None  Cognitive Screening   1) Repeat 3 items (Leader, Season, Table)    2) Clock draw: NORMAL  3) 3 item recall: Recalls 3 objects  Results: 3 items recalled: COGNITIVE IMPAIRMENT LESS LIKELY    Mini-CogTM Copyright S " "Pritesh. Licensed by the author for use in [facility]; reprinted with permission (bridgette@[facility]). All rights reserved.      Do you have sleep apnea, excessive snoring or daytime drowsiness?: no    Reviewed and updated as needed this visit by clinical staff  Tobacco  Allergies  Meds  Problems  Med Hx  Surg Hx  Fam Hx          Reviewed and updated as needed this visit by Provider   Allergies  Meds  Problems  Med Hx  Surg Hx  Fam Hx         Social History     Tobacco Use     Smoking status: Never Smoker     Smokeless tobacco: Never Used   Substance Use Topics     Alcohol use: Yes     Comment: infrequent     If you drink alcohol do you typically have >3 drinks per day or >7 drinks per week? No    Alcohol Use 8/30/2021   Prescreen: >3 drinks/day or >7 drinks/week? No   Prescreen: >3 drinks/day or >7 drinks/week? -               Current providers sharing in care for this patient include:   Patient Care Team:  Johann Tucker MD as PCP - General  Johann Tucker MD as Assigned PCP    The following health maintenance items are reviewed in Epic and correct as of today:  Health Maintenance Due   Topic Date Due     ANNUAL REVIEW OF HM ORDERS  Never done     INFLUENZA VACCINE (1) 09/01/2021     Lab work is in process          Review of Systems  12 point review of systems is negative other than what is discussed in the assessment and plan    OBJECTIVE:   BP (!) 168/78   Pulse 56   Ht 1.702 m (5' 7\")   Wt 103 kg (227 lb)   SpO2 99%   BMI 35.55 kg/m   Estimated body mass index is 35.55 kg/m  as calculated from the following:    Height as of this encounter: 1.702 m (5' 7\").    Weight as of this encounter: 103 kg (227 lb).  Physical Exam  EYES: Eyelids, conjunctiva, and sclera were normal. Pupils were normal. Cornea, iris, and lens were normal bilaterally.  HEAD, EARS, NOSE, MOUTH, AND THROAT: Head and face were normal. TMs and external auditory canals are normal  NECK: Neck appearance was normal. " There were no neck masses and the thyroid was not enlarged and no nodules are felt.  No lymphadenopathy.  RESPIRATORY: Breathing pattern was normal and the chest moved symmetrically.   Lung sounds were normal and there were no rales or wheezes.  CARDIOVASCULAR: Heart rate and rhythm were normal.  S1 and S2 were normal and there were no extra sounds or murmurs. Peripheral pulses in arms and legs were normal.  Jugular venous pressure was normal.  There was no peripheral edema.  No carotid bruits.  BREAST: No palpable masses or tenderness.  No axillary nodes.  GASTROINTESTINAL:  Bowel sounds were present.   Palpation detected no tenderness, mass, or enlarged organs.   MUSCULOSKELETAL: Skeletal configuration was normal and muscle mass was normal for age. Joint appearance was overall normal.  LYMPHATIC: There were no enlarged nodes.  SKIN/HAIR/NAILS: Skin color was normal.  Hair and nails were normal.There were no skin lesions.  NEUROLOGIC: The patient was alert and oriented to person, place, time, and circumstance. Speech was normal. Cranial nerves were normal. Motor strength was normal for age. The patient was normally coordinated.  Sensation intact.  PSYCHIATRIC:  Mood and affect were normal and the patient had normal recent and remote memory. The patient's judgment and insight were normal.        ASSESSMENT / PLAN:   1. Encounter for Medicare annual wellness exam  Immunizations are reviewed and providing Pneumovax 23 today as this was not given after age 65.  Also recommending annual flu shot and to get her Covid booster this fall.  Everything else looks up-to-date.  She has a living well.  Non-smoker.  Uses alcohol in moderation.  Regular exercise discussed.  Up to date with colonoscopies and this should be repeated in November 2022 after multiple polyps removed in 2019.  Breast exam completed and she will continue to get a mammogram annually.  She has had a complete hysterectomy.  Last DEXA was 2018 was normal and  this should be repeated 2023. Dementia and depression screening completed.  She sees an ophthalmologist every year. Skin exam performed and recommending regular use of sunblock.  She sees a dermatologist every year.  Minimal risk for hepatitis C.  Will screen for diabetes with fasting glucose.    2. Obesity (BMI 35.0-39.9 without comorbidity)  She has successfully lost 25 pounds over the last year and I congratulated her on this achievement.  She will continue to work at getting regular exercise and eating a healthy diet.    3. Elevated blood pressure reading without diagnosis of hypertension  Blood pressure is surprisingly elevated.  168/78 with no history of hypertension.  She is not using any supplements.  She normally watches her sodium intake carefully but was out of town in the past 2 weeks and was eating out at restaurants almost nightly.  I suspect this may be contributing.  She does not use NSAIDs.  I would like her to return in 10 days to have her blood pressure rechecked and she will restrict her sodium between now and then with a goal of less than 2000 mg daily.  She may also want to look into getting a home blood pressure monitor.  We will also check appropriate labs to evaluate.  If persistently elevated I would recommend starting medication.  - CBC with platelets; Future  - Comprehensive metabolic panel (BMP + Alb, Alk Phos, ALT, AST, Total. Bili, TP); Future  - UA Macro with Reflex to Micro and Culture - lab collect; Future  - CBC with platelets  - Comprehensive metabolic panel (BMP + Alb, Alk Phos, ALT, AST, Total. Bili, TP)  - UA Macro with Reflex to Micro and Culture - lab collect    4. Hypothyroidism, unspecified type  Recheck TSH to make sure current dose of levothyroxine is appropriate and make adjustments if needed  - TSH; Future  - TSH    5. Mixed hyperlipidemia  She is taking pravastatin daily to manage hypercholesterolemia and tolerating without side effects  - Lipid Profile (Chol, Trig,  "HDL, LDL calc); Future  - Lipid Profile (Chol, Trig, HDL, LDL calc)    6. Chronic right shoulder pain  She has had chronic pain involving the right shoulder with known rotator cuff tear.  Surgery has been discussed but she is trying to manage with physical therapy and exercises    7. Primary osteoarthritis involving multiple joints  She has had both of her knees replaced    8. Sensorineural hearing loss (SNHL) of both ears  She has hearing aids    9. Tinnitus of both ears  Stable    Patient has been advised of split billing requirements and indicates understanding: Yes      Estimated body mass index is 35.55 kg/m  as calculated from the following:    Height as of this encounter: 1.702 m (5' 7\").    Weight as of this encounter: 103 kg (227 lb).        She reports that she has never smoked. She has never used smokeless tobacco.      Appropriate preventive services were discussed with this patient, including applicable screening as appropriate for cardiovascular disease, diabetes, osteopenia/osteoporosis, and glaucoma.  As appropriate for age/gender, discussed screening for colorectal cancer, prostate cancer, breast cancer, and cervical cancer. Checklist reviewing preventive services available has been given to the patient.    Reviewed patients plan of care and provided an AVS. The Basic Care Plan (routine screening as documented in Health Maintenance) for Qian meets the Care Plan requirement. This Care Plan has been established and reviewed with the Patient.    Counseling Resources:  ATP IV Guidelines  Pooled Cohorts Equation Calculator  Breast Cancer Risk Calculator  Breast Cancer: Medication to Reduce Risk  FRAX Risk Assessment  ICSI Preventive Guidelines  Dietary Guidelines for Americans, 2010  USDA's MyPlate  ASA Prophylaxis  Lung CA Screening    Chilo Harris MD  [facility]    Identified Health Risks:      The patient was counseled and encouraged to consider modifying " their diet and eating habits. She was provided with information on recommended healthy diet options.  Information on urinary incontinence and treatment options given to patient.

## 2021-09-09 ENCOUNTER — ALLIED HEALTH/NURSE VISIT (OUTPATIENT)
Dept: FAMILY MEDICINE | Facility: CLINIC | Age: 76
End: 2021-09-09
Payer: COMMERCIAL

## 2021-09-09 VITALS — SYSTOLIC BLOOD PRESSURE: 138 MMHG | DIASTOLIC BLOOD PRESSURE: 82 MMHG

## 2021-09-09 DIAGNOSIS — Z01.30 BLOOD PRESSURE CHECK: Primary | ICD-10-CM

## 2021-09-09 PROCEDURE — 99207 PR NO CHARGE NURSE ONLY: CPT

## 2021-09-09 NOTE — PROGRESS NOTES
I met with Qian Lee at the request of Dr. Harris to recheck her blood pressure.  Blood pressure medications on the med list were reviewed with patient.    Patient has taken all medications as per usual regimen: Yes  Patient reports tolerating them without any issues or concerns: Yes    Vitals:    09/09/21 1103 09/09/21 1114   BP: (!) 148/92 138/82   BP Location: Right arm Left arm   Patient Position: Sitting Sitting   Cuff Size: Adult Large Adult Large       After 5 minutes, the patient's blood pressure was <140/90, the previous encounter was reviewed, recorded blood pressure below 140/90. Patient was discharged and the note will be sent to the provider for final review.

## 2021-09-29 ENCOUNTER — HOSPITAL ENCOUNTER (OUTPATIENT)
Dept: MAMMOGRAPHY | Facility: CLINIC | Age: 76
Discharge: HOME | End: 2021-09-29
Attending: INTERNAL MEDICINE | Admitting: INTERNAL MEDICINE
Payer: COMMERCIAL

## 2021-09-29 DIAGNOSIS — Z12.31 SCREENING MAMMOGRAM, ENCOUNTER FOR: ICD-10-CM

## 2021-09-29 PROCEDURE — 77067 SCR MAMMO BI INCL CAD: CPT

## 2022-07-03 DIAGNOSIS — E03.4 HYPOTHYROIDISM DUE TO ACQUIRED ATROPHY OF THYROID: ICD-10-CM

## 2022-07-04 NOTE — TELEPHONE ENCOUNTER
"Former patient of [facility]ichayo & has not established care with another provider.  Please assign refill request to covering provider per clinic standard process.    Routing refill request to provider for review/approval because:  No PCP    Last Written Prescription Date:  7/19/21  Last Fill Quantity: 90,  # refills: 3   Last office visit provider:  8/30/21     Requested Prescriptions   Pending Prescriptions Disp Refills     levothyroxine (SYNTHROID/LEVOTHROID) 125 MCG tablet [Pharmacy Med Name: L-THYROXINE (SYNTHROID) TABS 125MCG] 90 tablet 3     Sig: TAKE 1 TABLET DAILY (OVERDUE FOR TSH)       Thyroid Protocol Passed - 7/4/2022 12:53 PM        Passed - Patient is 12 years or older        Passed - Recent (12 mo) or future (30 days) visit within the authorizing provider's specialty     Patient has had an office visit with the authorizing provider or a provider within the authorizing providers department within the previous 12 mos or has a future within next 30 days. See \"Patient Info\" tab in inbasket, or \"Choose Columns\" in Meds & Orders section of the refill encounter.              Passed - Medication is active on med list        Passed - Normal TSH on file in past 12 months     Recent Labs   Lab Test 08/30/21  1154   TSH 0.62              Passed - No active pregnancy on record     If patient is pregnant or has had a positive pregnancy test, please check TSH.          Passed - No positive pregnancy test in past 12 months     If patient is pregnant or has had a positive pregnancy test, please check TSH.               Raghu Elias RN 07/04/22 12:54 PM  "

## 2022-07-06 RX ORDER — LEVOTHYROXINE SODIUM 125 UG/1
TABLET ORAL
Qty: 90 TABLET | Refills: 0 | Status: SHIPPED | OUTPATIENT
Start: 2022-07-06 | End: 2022-09-30

## 2022-08-30 ENCOUNTER — OFFICE VISIT (OUTPATIENT)
Dept: INTERNAL MEDICINE | Facility: CLINIC | Age: 77
End: 2022-08-30
Payer: COMMERCIAL

## 2022-08-30 VITALS
DIASTOLIC BLOOD PRESSURE: 100 MMHG | HEIGHT: 67 IN | BODY MASS INDEX: 38.61 KG/M2 | HEART RATE: 60 BPM | WEIGHT: 246 LBS | OXYGEN SATURATION: 98 % | SYSTOLIC BLOOD PRESSURE: 168 MMHG

## 2022-08-30 DIAGNOSIS — E66.812 CLASS 2 SEVERE OBESITY DUE TO EXCESS CALORIES WITH SERIOUS COMORBIDITY AND BODY MASS INDEX (BMI) OF 38.0 TO 38.9 IN ADULT (H): ICD-10-CM

## 2022-08-30 DIAGNOSIS — E78.2 MIXED HYPERLIPIDEMIA: ICD-10-CM

## 2022-08-30 DIAGNOSIS — Z86.0100 PERSONAL HISTORY OF COLONIC POLYPS: ICD-10-CM

## 2022-08-30 DIAGNOSIS — Z00.00 ENCOUNTER FOR MEDICARE ANNUAL WELLNESS EXAM: Primary | ICD-10-CM

## 2022-08-30 DIAGNOSIS — G89.29 CHRONIC RIGHT SHOULDER PAIN: ICD-10-CM

## 2022-08-30 DIAGNOSIS — E03.9 HYPOTHYROIDISM, UNSPECIFIED TYPE: ICD-10-CM

## 2022-08-30 DIAGNOSIS — M25.511 CHRONIC RIGHT SHOULDER PAIN: ICD-10-CM

## 2022-08-30 DIAGNOSIS — M15.0 PRIMARY OSTEOARTHRITIS INVOLVING MULTIPLE JOINTS: ICD-10-CM

## 2022-08-30 DIAGNOSIS — E66.01 CLASS 2 SEVERE OBESITY DUE TO EXCESS CALORIES WITH SERIOUS COMORBIDITY AND BODY MASS INDEX (BMI) OF 38.0 TO 38.9 IN ADULT (H): ICD-10-CM

## 2022-08-30 DIAGNOSIS — H90.3 SENSORINEURAL HEARING LOSS (SNHL) OF BOTH EARS: ICD-10-CM

## 2022-08-30 DIAGNOSIS — Z96.653 HISTORY OF BILATERAL KNEE REPLACEMENT: ICD-10-CM

## 2022-08-30 DIAGNOSIS — I10 ESSENTIAL HYPERTENSION: ICD-10-CM

## 2022-08-30 LAB
ALBUMIN SERPL BCG-MCNC: 4.6 G/DL (ref 3.5–5.2)
ALBUMIN UR-MCNC: NEGATIVE MG/DL
ALP SERPL-CCNC: 60 U/L (ref 35–104)
ALT SERPL W P-5'-P-CCNC: 22 U/L (ref 10–35)
ANION GAP SERPL CALCULATED.3IONS-SCNC: 11 MMOL/L (ref 7–15)
APPEARANCE UR: CLEAR
AST SERPL W P-5'-P-CCNC: 28 U/L (ref 10–35)
BILIRUB SERPL-MCNC: 0.5 MG/DL
BILIRUB UR QL STRIP: NEGATIVE
BUN SERPL-MCNC: 13.7 MG/DL (ref 8–23)
CALCIUM SERPL-MCNC: 10.1 MG/DL (ref 8.8–10.2)
CHLORIDE SERPL-SCNC: 102 MMOL/L (ref 98–107)
CHOLEST SERPL-MCNC: 219 MG/DL
COLOR UR AUTO: YELLOW
CREAT SERPL-MCNC: 0.86 MG/DL (ref 0.51–0.95)
DEPRECATED HCO3 PLAS-SCNC: 26 MMOL/L (ref 22–29)
ERYTHROCYTE [DISTWIDTH] IN BLOOD BY AUTOMATED COUNT: 12.1 % (ref 10–15)
GFR SERPL CREATININE-BSD FRML MDRD: 70 ML/MIN/1.73M2
GLUCOSE SERPL-MCNC: 103 MG/DL (ref 70–99)
GLUCOSE UR STRIP-MCNC: NEGATIVE MG/DL
HCT VFR BLD AUTO: 45.7 % (ref 35–47)
HDLC SERPL-MCNC: 67 MG/DL
HGB BLD-MCNC: 15.1 G/DL (ref 11.7–15.7)
HGB UR QL STRIP: NEGATIVE
KETONES UR STRIP-MCNC: NEGATIVE MG/DL
LDLC SERPL CALC-MCNC: 114 MG/DL
LEUKOCYTE ESTERASE UR QL STRIP: NEGATIVE
MCH RBC QN AUTO: 29.5 PG (ref 26.5–33)
MCHC RBC AUTO-ENTMCNC: 33 G/DL (ref 31.5–36.5)
MCV RBC AUTO: 89 FL (ref 78–100)
NITRATE UR QL: NEGATIVE
NONHDLC SERPL-MCNC: 152 MG/DL
PH UR STRIP: 5.5 [PH] (ref 5–8)
PLATELET # BLD AUTO: 196 10E3/UL (ref 150–450)
POTASSIUM SERPL-SCNC: 4.8 MMOL/L (ref 3.4–5.3)
PROT SERPL-MCNC: 7.5 G/DL (ref 6.4–8.3)
RBC # BLD AUTO: 5.12 10E6/UL (ref 3.8–5.2)
SODIUM SERPL-SCNC: 139 MMOL/L (ref 136–145)
SP GR UR STRIP: 1.02 (ref 1–1.03)
TRIGL SERPL-MCNC: 192 MG/DL
TSH SERPL DL<=0.005 MIU/L-ACNC: 1.16 UIU/ML (ref 0.3–4.2)
UROBILINOGEN UR STRIP-ACNC: 0.2 E.U./DL
WBC # BLD AUTO: 6.8 10E3/UL (ref 4–11)

## 2022-08-30 PROCEDURE — 80061 LIPID PANEL: CPT | Performed by: INTERNAL MEDICINE

## 2022-08-30 PROCEDURE — 81003 URINALYSIS AUTO W/O SCOPE: CPT | Performed by: INTERNAL MEDICINE

## 2022-08-30 PROCEDURE — G0438 PPPS, INITIAL VISIT: HCPCS | Performed by: INTERNAL MEDICINE

## 2022-08-30 PROCEDURE — 99214 OFFICE O/P EST MOD 30 MIN: CPT | Mod: 25 | Performed by: INTERNAL MEDICINE

## 2022-08-30 PROCEDURE — 80053 COMPREHEN METABOLIC PANEL: CPT | Performed by: INTERNAL MEDICINE

## 2022-08-30 PROCEDURE — 36415 COLL VENOUS BLD VENIPUNCTURE: CPT | Performed by: INTERNAL MEDICINE

## 2022-08-30 PROCEDURE — 84443 ASSAY THYROID STIM HORMONE: CPT | Performed by: INTERNAL MEDICINE

## 2022-08-30 PROCEDURE — 85027 COMPLETE CBC AUTOMATED: CPT | Performed by: INTERNAL MEDICINE

## 2022-08-30 RX ORDER — LOSARTAN POTASSIUM 50 MG/1
50 TABLET ORAL DAILY
Qty: 30 TABLET | Refills: 1 | Status: SHIPPED | OUTPATIENT
Start: 2022-08-30 | End: 2022-09-30

## 2022-08-30 RX ORDER — CLINDAMYCIN HCL 150 MG
CAPSULE ORAL
COMMUNITY
End: 2024-09-09

## 2022-08-30 RX ORDER — ACETAMINOPHEN 500 MG
TABLET ORAL
COMMUNITY

## 2022-08-30 ASSESSMENT — ENCOUNTER SYMPTOMS
ABDOMINAL PAIN: 0
DIARRHEA: 0
DIZZINESS: 1
WEAKNESS: 0
ARTHRALGIAS: 1
CONSTIPATION: 0
CHILLS: 0
NAUSEA: 0
EYE PAIN: 0
FEVER: 0
HEMATOCHEZIA: 0
HEARTBURN: 1
DYSURIA: 0
PARESTHESIAS: 0
MYALGIAS: 0
COUGH: 0
FREQUENCY: 0
PALPITATIONS: 0
HEMATURIA: 0
HEADACHES: 0
SORE THROAT: 0
SHORTNESS OF BREATH: 0
JOINT SWELLING: 1
BREAST MASS: 0

## 2022-08-30 ASSESSMENT — PATIENT HEALTH QUESTIONNAIRE - PHQ9
SUM OF ALL RESPONSES TO PHQ QUESTIONS 1-9: 0
5. POOR APPETITE OR OVEREATING: NOT AT ALL

## 2022-08-30 ASSESSMENT — ANXIETY QUESTIONNAIRES
1. FEELING NERVOUS, ANXIOUS, OR ON EDGE: NOT AT ALL
6. BECOMING EASILY ANNOYED OR IRRITABLE: NOT AT ALL
2. NOT BEING ABLE TO STOP OR CONTROL WORRYING: NOT AT ALL
5. BEING SO RESTLESS THAT IT IS HARD TO SIT STILL: NOT AT ALL
GAD7 TOTAL SCORE: 0
3. WORRYING TOO MUCH ABOUT DIFFERENT THINGS: NOT AT ALL
7. FEELING AFRAID AS IF SOMETHING AWFUL MIGHT HAPPEN: NOT AT ALL
GAD7 TOTAL SCORE: 0

## 2022-08-30 ASSESSMENT — ACTIVITIES OF DAILY LIVING (ADL): CURRENT_FUNCTION: NO ASSISTANCE NEEDED

## 2022-08-30 NOTE — PATIENT INSTRUCTIONS
Annual flu shot this fall along with COVID booster when new vaccine is available    Continue to get a mammogram every year due again in October    You are due to get a screening colonoscopy completed in November of this year.  You can call [facility] at [phone] to schedule    Continue to see your dermatologist every year    Annual visit with your eye doctor    You can try OTC Voltaren gel for your hand arthritis symptoms.  You may also take up to 6 tablets of extra strength Tylenol every day      Patient Education   Personalized Prevention Plan  You are due for the preventive services outlined below.  Your care team is available to assist you in scheduling these services.  If you have already completed any of these items, please share that information with your care team to update in your medical record.  Health Maintenance Due   Topic Date Due    Flu Vaccine (1) 09/01/2022       Understanding USDA MyPlate  The USDA has guidelines to help you make healthy food choices. These are called MyPlate. MyPlate shows the food groups that make up healthy meals using the image of a place setting. Before you eat, think about the healthiest choices for what to put on your plate or in your cup or bowl. To learn more about building a healthy plate, visit www.choosemyplate.gov.    The food groups  Fruits. Any fruit or 100% fruit juice counts as part of the Fruit Group. Fruits may be fresh, canned, frozen, or dried, and may be whole, cut-up, or pureed. Make 1/2 of your plate fruits and vegetables.  Vegetables. Any vegetable or 100% vegetable juice counts as a member of the Vegetable Group. Vegetables may be fresh, frozen, canned, or dried. They can be served raw or cooked and may be whole, cut-up, or mashed. Make 1/2 of your plate fruits and vegetables.  Grains. All foods made from grains are part of the Grains Group. These include wheat, rice, oats, cornmeal, and barley. Grains are often used to make foods such  as bread, pasta, oatmeal, cereal, tortillas, and grits. Grains should be no more than 1/4 of your plate. At least half of your grains should be whole grains.  Protein. This group includes meat, poultry, seafood, beans and peas, eggs, processed soy products (such as tofu), nuts (including nut butters), and seeds. Make protein choices no more than 1/4 of your plate. Meat and poultry choices should be lean or low fat.  Dairy. The Dairy Group includes all fluid milk products and foods made from milk that contain calcium, such as yogurt and cheese. (Foods that have little calcium, such as cream, butter, and cream cheese, are not part of this group.) Most dairy choices should be low-fat or fat-free.  Oils. Oils aren't a food group, but they do contain essential nutrients. However it's important to watch your intake of oils. These are fats that are liquid at room temperature. They include canola, corn, olive, soybean, vegetable, and sunflower oil. Foods that are mainly oil include mayonnaise, certain salad dressings, and soft margarines. You likely already get your daily oil allowance from the foods you eat.  Things to limit  Eating healthy also means limiting these things in your diet:     Salt (sodium). Many processed foods have a lot of sodium. To keep sodium intake down, eat fresh vegetables, meats, poultry, and seafood when possible. Purchase low-sodium, reduced-sodium, or no-salt-added food products at the store. And don't add salt to your meals at home. Instead, season them with herbs and spices such as dill, oregano, cumin, and paprika. Or try adding flavor with lemon or lime zest and juice.  Saturated fat. Saturated fats are most often found in animal products such as beef, pork, and chicken. They are often solid at room temperature, such as butter. To reduce your saturated fat intake, choose leaner cuts of meat and poultry. And try healthier cooking methods such as grilling, broiling, roasting, or baking. For a  simple lower-fat swap, use plain nonfat yogurt instead of mayonnaise when making potato salad or macaroni salad.  Added sugars. These are sugars added to foods. They are in foods such as ice cream, candy, soda, fruit drinks, sports drinks, energy drinks, cookies, pastries, jams, and syrups. Cut down on added sugars by sharing sweet treats with a family member or friend. You can also choose fruit for dessert, and drink water or other unsweetened beverages.     [employer] last reviewed this educational content on 6/1/2020 2000-2021 [employer]. All rights reserved. This information is not intended as a substitute for professional medical care. Always follow your healthcare professional's instructions.             Exercise for a Healthier Heart  You may wonder how you can improve the health of your heart. If you re thinking about exercise, you re on the right track. You don t need to become an athlete. But you do need a certain amount of brisk exercise to help strengthen your heart. If you have been diagnosed with a heart condition, your healthcare provider may advise exercise to help stabilize your condition. To help make exercise a habit, choose safe, fun activities.      Exercise with a friend. When activity is fun, you're more likely to stick with it.   Before you start  Check with your healthcare provider before starting an exercise program. This is especially important if you have not been active for a while. It's also important if you have a long-term (chronic) health problem such as heart disease, diabetes, or obesity. Or if you are at high risk for having these problems.   Why exercise?  Exercising regularly offers many healthy rewards. It can help you do all of the following:   Improve your blood cholesterol level to help prevent further heart trouble  Lower your blood pressure to help prevent a stroke or heart attack  Control diabetes, or reduce your risk of getting this disease  Improve your  heart and lung function  Reach and stay at a healthy weight  Make your muscles stronger so you can stay active  Prevent falls and fractures by slowing the loss of bone mass (osteoporosis)  Manage stress better  Reduce your blood pressure  Improve your sense of self and your body image  Exercise tips    Ease into your routine. Set small goals. Then build on them. If you are not sure what your activity level should be, talk with your healthcare provider first before starting an exercise routine.  Exercise on most days. Aim for a total of 150 minutes (2 hours and 30 minutes) or more of moderate-intensity aerobic activity each week. Or 75 minutes (1 hour and 15 minutes) or more of vigorous-intensity aerobic activity each week. Or try for a combination of both. Moderate activity means that you breathe heavier and your heart rate increases but you can still talk. Think about doing 40 minutes of moderate exercise, 3 to 4 times a week. For best results, activity should last for about 40 minutes to lower blood pressure and cholesterol. It's OK to work up to the 40-minute period over time. Examples of moderate-intensity activity are walking 1 mile in 15 minutes. Or doing 30 to 45 minutes of yard work.  Step up your daily activity level.  Along with your exercise program, try being more active the whole day. Walk instead of drive. Or park further away so that you take more steps each day. Do more household tasks or yard work. You may not be able to meet the advised mount of physical activity. But doing some moderate- or vigorous-intensity aerobic activity can help reduce your risk for heart disease. Your healthcare provider can help you figure out what is best for you.  Choose 1 or more activities you enjoy.  Walking is one of the easiest things you can do. You can also try swimming, riding a bike, dancing, or taking an exercise class.    When to call your healthcare provider  Call your healthcare provider if you have any of  these:   Chest pain or feel dizzy or lightheaded  Burning, tightness, pressure, or heaviness in your chest, neck, shoulders, back, or arms  Abnormal shortness of breath  More joint or muscle pain  A very fast or irregular heartbeat (palpitations)  [employer] last reviewed this educational content on 7/1/2019 2000-2021 [employer]. All rights reserved. This information is not intended as a substitute for professional medical care. Always follow your healthcare professional's instructions.

## 2022-08-30 NOTE — PROGRESS NOTES
"SUBJECTIVE:   Qian Lee is a 76 year old female who presents for Preventive Visit.    HOH-12-apot-old woman here for annual wellness visit and to follow-up medical problems including osteoarthritis involving multiple joints, persistently elevated blood pressure readings at home with no previous history of hypertension, hypothyroidism and hyperlipidemia.  See assessment plan for details.    Patient has been advised of split billing requirements and indicates understanding: Yes  Are you in the first 12 months of your Medicare coverage?  No    Healthy Habits:     In general, how would you rate your overall health?  Good    Frequency of exercise:  2-3 days/week    Duration of exercise:  45-60 minutes    Do you usually eat at least 4 servings of fruit and vegetables a day, include whole grains    & fiber and avoid regularly eating high fat or \"junk\" foods?  No    Taking medications regularly:  Yes    Medication side effects:  Not applicable    Ability to successfully perform activities of daily living:  No assistance needed    Home Safety:  No safety concerns identified    Hearing Impairment:  Feel that people are mumbling or not speaking clearly    In the past 6 months, have you been bothered by leaking of urine?  No    In general, how would you rate your overall mental or emotional health?  Excellent      PHQ-2 Total Score: 0    Additional concerns today:  Yes    Do you feel safe in your environment? Yes    Have you ever done Advance Care Planning? (For example, a Health Directive, POLST, or a discussion with a medical provider or your loved ones about your wishes): Yes, patient states has an Advance Care Planning document and will bring a copy to the clinic.       Fall risk  Fallen 2 or more times in the past year?: No  Any fall with injury in the past year?: No    Cognitive Screening   1) Repeat 3 items (Leader, Season, Table)    2) Clock draw: NORMAL  3) 3 item recall: Recalls 3 objects  Results: NORMAL " clock, 1-2 items recalled: COGNITIVE IMPAIRMENT LESS LIKELY    Mini-CogTM Copyright JOSAFAT Jonas. Licensed by the author for use in [facility]; reprinted with permission (bridgette@[facility]). All rights reserved.      Do you have sleep apnea, excessive snoring or daytime drowsiness?: no    Reviewed and updated as needed this visit by clinical staff   Tobacco  Allergies  Meds                Reviewed and updated as needed this visit by Provider                   Social History     Tobacco Use     Smoking status: Never Smoker     Smokeless tobacco: Never Used   Substance Use Topics     Alcohol use: Yes     Comment: infrequent         Alcohol Use 8/30/2022   Prescreen: >3 drinks/day or >7 drinks/week? No   Prescreen: >3 drinks/day or >7 drinks/week? -               Current providers sharing in care for this patient include:   Patient Care Team:  Chilo Harris MD as PCP - General (Internal Medicine)  Chilo Harris MD as Assigned PCP    The following health maintenance items are reviewed in Epic and correct as of today:  Health Maintenance Due   Topic Date Due     ANNUAL REVIEW OF HM ORDERS  Never done     MEDICARE ANNUAL WELLNESS VISIT  08/30/2022     INFLUENZA VACCINE (1) 09/01/2022     Lab work is in process        Pertinent mammograms are reviewed under the imaging tab.    Review of Systems   Constitutional: Negative for chills and fever.   HENT: Negative for congestion, ear pain, hearing loss and sore throat.    Eyes: Negative for pain and visual disturbance.   Respiratory: Negative for cough and shortness of breath.    Cardiovascular: Negative for chest pain and palpitations.   Gastrointestinal: Positive for heartburn. Negative for abdominal pain, constipation, diarrhea, hematochezia and nausea.   Breasts:  Negative for breast mass and discharge.   Genitourinary: Negative for dysuria, frequency, genital sores, hematuria, pelvic pain, urgency and vaginal bleeding.   Musculoskeletal: Positive for  "arthralgias and joint swelling. Negative for myalgias.   Skin: Negative for rash.   Neurological: Positive for dizziness. Negative for weakness, headaches and paresthesias.   Psychiatric/Behavioral: Negative for mood changes.         OBJECTIVE:   BP (!) 160/84   Pulse 60   Ht 1.702 m (5' 7\")   Wt 111.6 kg (246 lb)   SpO2 98%   BMI 38.53 kg/m   Estimated body mass index is 38.53 kg/m  as calculated from the following:    Height as of this encounter: 1.702 m (5' 7\").    Weight as of this encounter: 111.6 kg (246 lb).  Physical Exam  EYES: Eyelids, conjunctiva, and sclera were normal. Pupils were normal. Cornea, iris, and lens were normal bilaterally.  HEAD, EARS, NOSE, MOUTH, AND THROAT: Head and face were normal. TMs and external auditory canals are normal  NECK: Neck appearance was normal. There were no neck masses and the thyroid was not enlarged and no nodules are felt.  No lymphadenopathy.  RESPIRATORY: Breathing pattern was normal and the chest moved symmetrically.   Lung sounds were normal and there were no rales or wheezes.  CARDIOVASCULAR: Heart rate and rhythm were normal.  S1 and S2 were normal and there were no extra sounds or murmurs. Peripheral pulses in arms and legs were normal.  There was no peripheral edema.  No carotid bruits.  BREAST: No palpable masses or tenderness.  No axillary nodes.  GASTROINTESTINAL:  Bowel sounds were present.   Palpation detected no tenderness, mass, or enlarged organs.   MUSCULOSKELETAL: Skeletal configuration was normal and muscle mass was normal for age. Joint appearance was overall normal.  LYMPHATIC: There were no enlarged nodes.  SKIN/HAIR/NAILS: Skin color was normal.  There were no concerning skin lesions.  NEUROLOGIC: The patient was alert and oriented to person, place, time, and circumstance. Speech was normal. Cranial nerves were normal. Motor strength was normal for age. The patient was normally coordinated.  Sensation intact.  PSYCHIATRIC:  Mood and affect " were normal and the patient had normal recent and remote memory. The patient's judgment and insight were normal.  PHQ-9 score is 0 and KAT-7 score is 0        ASSESSMENT / PLAN:   1. Encounter for Medicare annual wellness exam  Immunizations are reviewed and everything is up-to-date.  She will get her flu shot and COVID booster this fall when new vaccine is available.  She has a living will.  Non-smoker.  Uses alcohol in moderation.  Regular exercise and good diet habits to maintain a healthy weight discussed.  Up to date with colonoscopies and this should be repeated in November of this year.  Recommending annual mammogram for breast cancer screening.   Last DEXA was 2018 and was normal.  Consider repeating next year.. Dementia and depression screening completed.  Recommending annual eye exam.  Recommending seeing a dentist every 6 months.  Skin exam performed and recommending regular use of sunblock.  She sees a dermatologist every year with her family history of melanoma.  Hepatitis C antibody for screening was normal.  Will screen for diabetes with fasting glucose.       2. Essential hypertension  Blood pressure again elevated at our visit and she has been monitoring at home and finding her blood pressure readings consistently high.  She does not use NSAIDs.  She watches her salt.  Other lifestyle modification including weight loss and exercise is discussed but I would also recommend starting losartan 50 mg daily.  We will bring her back in 4 weeks to reassess.  Checking appropriate labs.  - CBC with platelets; Future  - Comprehensive metabolic panel (BMP + Alb, Alk Phos, ALT, AST, Total. Bili, TP); Future  - UA Macro with Reflex to Micro and Culture - lab collect; Future  - losartan (COZAAR) 50 MG tablet; Take 1 tablet (50 mg) by mouth daily  Dispense: 30 tablet; Refill: 1    3. Class 2 severe obesity due to excess calories with serious comorbidity and body mass index (BMI) of 38.0 to 38.9 in adult  "(H)  Unfortunately she has gained back much of the weight that she had lost.  She is up 20 pounds from last year.  She enjoys sugar and carbs too much.  Discussed important diet changes that are needed and encouraging her to get more regular exercise.  Weight loss would certainly help with blood pressure control.    4. Hypothyroidism, unspecified type  Recheck TSH and adjust dose of Synthroid as necessary  - TSH with free T4 reflex; Future    5. Primary osteoarthritis involving multiple joints  She has had both knees replaced.  Increasing arthritis symptoms in her hands.  She may take up to 3000 mg of acetaminophen daily.  She should avoid NSAIDs but could try OTC Voltaren gel as needed    6. Mixed hyperlipidemia  Recheck lipid profile taking pravastatin  - Lipid Profile (Chol, Trig, HDL, LDL calc); Future    7. History of bilateral knee replacement      8. Chronic right shoulder pain  Stable    9. Personal history of colonic polyps  Due for colonoscopy this November with history of multiple polyps removed in 2019    10. Sensorineural hearing loss (SNHL) of both ears  She has hearing aids    Patient has been advised of split billing requirements and indicates understanding: Yes      Estimated body mass index is 38.53 kg/m  as calculated from the following:    Height as of this encounter: 1.702 m (5' 7\").    Weight as of this encounter: 111.6 kg (246 lb).        She reports that she has never smoked. She has never used smokeless tobacco.      Appropriate preventive services were discussed with this patient, including applicable screening as appropriate for cardiovascular disease, diabetes, osteopenia/osteoporosis, and glaucoma.  As appropriate for age/gender, discussed screening for colorectal cancer, prostate cancer, breast cancer, and cervical cancer. Checklist reviewing preventive services available has been given to the patient.    Reviewed patients plan of care and provided an AVS. The Basic Care Plan (routine " screening as documented in Health Maintenance) for Qian meets the Care Plan requirement. This Care Plan has been established and reviewed with the Patient.    Counseling Resources:  ATP IV Guidelines  Pooled Cohorts Equation Calculator  Breast Cancer Risk Calculator  Breast Cancer: Medication to Reduce Risk  FRAX Risk Assessment  ICSI Preventive Guidelines  Dietary Guidelines for Americans, 2010  [employer] MyPlate  ASA Prophylaxis  Lung CA Screening    Chilo Harris MD  [facility]    Identified Health Risks:    The patient was counseled and encouraged to consider modifying their diet and eating habits. She was provided with information on recommended healthy diet options.  She is at risk for lack of exercise and has been provided with information to increase physical activity for the benefit of her well-being.

## 2022-09-25 ENCOUNTER — HEALTH MAINTENANCE LETTER (OUTPATIENT)
Age: 77
End: 2022-09-25

## 2022-09-29 ENCOUNTER — TRANSFERRED RECORDS (OUTPATIENT)
Dept: HEALTH INFORMATION MANAGEMENT | Facility: CLINIC | Age: 77
End: 2022-09-29

## 2022-09-30 ENCOUNTER — OFFICE VISIT (OUTPATIENT)
Dept: INTERNAL MEDICINE | Facility: CLINIC | Age: 77
End: 2022-09-30
Payer: COMMERCIAL

## 2022-09-30 VITALS
HEART RATE: 77 BPM | OXYGEN SATURATION: 98 % | WEIGHT: 252 LBS | SYSTOLIC BLOOD PRESSURE: 138 MMHG | DIASTOLIC BLOOD PRESSURE: 82 MMHG | BODY MASS INDEX: 39.55 KG/M2 | HEIGHT: 67 IN

## 2022-09-30 DIAGNOSIS — I10 ESSENTIAL HYPERTENSION: Primary | ICD-10-CM

## 2022-09-30 DIAGNOSIS — E78.2 MIXED HYPERLIPIDEMIA: ICD-10-CM

## 2022-09-30 DIAGNOSIS — R25.2 LEG CRAMPS: ICD-10-CM

## 2022-09-30 DIAGNOSIS — E03.4 HYPOTHYROIDISM DUE TO ACQUIRED ATROPHY OF THYROID: ICD-10-CM

## 2022-09-30 LAB
ANION GAP SERPL CALCULATED.3IONS-SCNC: 15 MMOL/L (ref 7–15)
BUN SERPL-MCNC: 16 MG/DL (ref 8–23)
CALCIUM SERPL-MCNC: 9.9 MG/DL (ref 8.8–10.2)
CHLORIDE SERPL-SCNC: 101 MMOL/L (ref 98–107)
CREAT SERPL-MCNC: 0.96 MG/DL (ref 0.51–0.95)
DEPRECATED HCO3 PLAS-SCNC: 23 MMOL/L (ref 22–29)
GFR SERPL CREATININE-BSD FRML MDRD: 61 ML/MIN/1.73M2
GLUCOSE SERPL-MCNC: 105 MG/DL (ref 70–99)
MAGNESIUM SERPL-MCNC: 2 MG/DL (ref 1.7–2.3)
POTASSIUM SERPL-SCNC: 4.9 MMOL/L (ref 3.4–5.3)
SODIUM SERPL-SCNC: 139 MMOL/L (ref 136–145)

## 2022-09-30 PROCEDURE — 83735 ASSAY OF MAGNESIUM: CPT | Performed by: INTERNAL MEDICINE

## 2022-09-30 PROCEDURE — 36415 COLL VENOUS BLD VENIPUNCTURE: CPT | Performed by: INTERNAL MEDICINE

## 2022-09-30 PROCEDURE — 99214 OFFICE O/P EST MOD 30 MIN: CPT | Mod: 25 | Performed by: INTERNAL MEDICINE

## 2022-09-30 PROCEDURE — G0008 ADMIN INFLUENZA VIRUS VAC: HCPCS | Performed by: INTERNAL MEDICINE

## 2022-09-30 PROCEDURE — 90662 IIV NO PRSV INCREASED AG IM: CPT | Performed by: INTERNAL MEDICINE

## 2022-09-30 PROCEDURE — 80048 BASIC METABOLIC PNL TOTAL CA: CPT | Performed by: INTERNAL MEDICINE

## 2022-09-30 RX ORDER — LOSARTAN POTASSIUM 100 MG/1
100 TABLET ORAL DAILY
Qty: 90 TABLET | Refills: 3 | Status: SHIPPED | OUTPATIENT
Start: 2022-09-30 | End: 2023-08-31

## 2022-09-30 RX ORDER — LEVOTHYROXINE SODIUM 125 UG/1
TABLET ORAL
Qty: 90 TABLET | Refills: 3 | Status: SHIPPED | OUTPATIENT
Start: 2022-09-30 | End: 2023-09-26

## 2022-09-30 RX ORDER — PRAVASTATIN SODIUM 20 MG
TABLET ORAL
Qty: 90 TABLET | Refills: 3 | Status: SHIPPED | OUTPATIENT
Start: 2022-09-30 | End: 2023-09-26

## 2022-09-30 RX ORDER — LOSARTAN POTASSIUM 100 MG/1
100 TABLET ORAL DAILY
Qty: 30 TABLET | Refills: 0 | Status: SHIPPED | OUTPATIENT
Start: 2022-09-30 | End: 2022-09-30

## 2022-09-30 NOTE — PATIENT INSTRUCTIONS
Continue to monitor your blood pressure at home twice weekly at rest.  Record your results.  If your blood pressure continues to average over 135 systolic (top number) or 85 diastolic (bottom number), please send me a message through [employer]

## 2022-09-30 NOTE — PROGRESS NOTES
Assessment & Plan     Essential hypertension  77-year-old woman here to follow-up uncontrolled hypertension.  Blood pressure repeatedly elevated at her last 2 visits including at her physical 1 month ago.  She was also finding her blood pressure readings consistently high at home.  Started losartan 50 mg daily 4 weeks ago.  Tolerating medication without side effects.  She continues to monitor blood pressure at home and it seems to be trending down.  She will find systolic readings 120-140 although still occasionally over 140.  Today her blood pressure is initially 138/82 but I checked on her right arm and it is 158/80.  There may be a component of whitecoat hypertension but nevertheless I think she would benefit from an increased dose of losartan and will change to 100 mg daily.  I will have her continue to monitor at home and if she is finding readings averaging over 135/85 over the next several months, she will contact me through [employer] and further adjustments can be made.  In the meantime, she should continue to work at lifestyle modification including restricting her sodium intake, getting more regular exercise and setting a goal of losing 5 to 10 pounds.  - losartan (COZAAR) 100 MG tablet; Take 1 tablet (100 mg) by mouth daily  - Basic metabolic panel  (Ca, Cl, CO2, Creat, Gluc, K, Na, BUN); Future  - Basic metabolic panel  (Ca, Cl, CO2, Creat, Gluc, K, Na, BUN)    Leg cramps  Occasional leg cramps.  We discussed the importance of better hydration.  She is asking about magnesium.  I will recheck a level to make sure there is no hypomagnesemia.  Taking an occasional OTC magnesium supplement is reasonable.  - Magnesium; Future  - Magnesium    Hypothyroidism due to acquired atrophy of thyroid  Refilling her levothyroxine  - levothyroxine (SYNTHROID/LEVOTHROID) 125 MCG tablet; TAKE 1 TABLET DAILY (OVERDUE FOR TSH)    Mixed hyperlipidemia  Refilling pravastatin  - pravastatin (PRAVACHOL) 20 MG tablet; TAKE 1  "TABLET EVERY EVENING             BMI:   Estimated body mass index is 39.47 kg/m  as calculated from the following:    Height as of this encounter: 1.702 m (5' 7\").    Weight as of this encounter: 114.3 kg (252 lb).           Return in about 11 months (around 8/30/2023) for Annual wellness visit.    Chilo Harris MD  [facility]    Maryse Laughlin is a 77 year old, presenting for the following health issues: Here to follow-up hypertension and to discuss leg cramps.  See assessment and plan for details.  Follow Up (Blood pressure follow up)      History of Present Illness       Hypertension: She presents for follow up of hypertension.  She does check blood pressure  regularly outside of the clinic. Outside blood pressures have been over 140/90. She follows a low salt diet.             Review of Systems   No chest pain.  No leg edema      Objective    /82 (BP Location: Right arm, Patient Position: Sitting)   Pulse 77   Ht 1.702 m (5' 7\")   Wt 114.3 kg (252 lb)   SpO2 98%   BMI 39.47 kg/m    Body mass index is 39.47 kg/m .  Physical Exam   Overweight but otherwise well-appearing woman      "

## 2022-10-25 ENCOUNTER — HOSPITAL ENCOUNTER (OUTPATIENT)
Dept: MAMMOGRAPHY | Facility: CLINIC | Age: 77
Discharge: HOME | End: 2022-10-25
Attending: INTERNAL MEDICINE | Admitting: INTERNAL MEDICINE
Payer: COMMERCIAL

## 2022-10-25 DIAGNOSIS — Z12.31 VISIT FOR SCREENING MAMMOGRAM: ICD-10-CM

## 2022-10-25 PROCEDURE — 77067 SCR MAMMO BI INCL CAD: CPT

## 2022-11-29 NOTE — PROGRESS NOTES
The patient was counseled and encouraged to consider modifying their diet and eating habits. She was provided with information on recommended healthy diet options.  Information on urinary incontinence and treatment options given to patient.   No

## 2022-12-01 ENCOUNTER — TRANSFERRED RECORDS (OUTPATIENT)
Dept: HEALTH INFORMATION MANAGEMENT | Facility: CLINIC | Age: 77
End: 2022-12-01

## 2022-12-29 ENCOUNTER — TRANSFERRED RECORDS (OUTPATIENT)
Dept: HEALTH INFORMATION MANAGEMENT | Facility: CLINIC | Age: 77
End: 2022-12-29
Payer: COMMERCIAL

## 2023-01-24 PROBLEM — Z86.0100 PERSONAL HISTORY OF COLONIC POLYPS: Status: ACTIVE | Noted: 2023-01-24

## 2023-03-16 DIAGNOSIS — N32.81 OVERACTIVE BLADDER: Primary | ICD-10-CM

## 2023-03-16 RX ORDER — OXYBUTYNIN CHLORIDE 5 MG/1
5 TABLET, EXTENDED RELEASE ORAL DAILY
Qty: 30 TABLET | Refills: 0 | Status: SHIPPED | OUTPATIENT
Start: 2023-03-16 | End: 2024-03-05

## 2023-03-16 NOTE — TELEPHONE ENCOUNTER
Call from patient    She has an appt 3/23 at 900am at Urology.  Can she get a refill until her appt?   Urgency and incontinence.    Karyn Palmer on 3/16/2023 at 3:16 PM

## 2023-03-17 NOTE — TELEPHONE ENCOUNTER
Pt calling in stating that she did  her 5mg oxybutynin order, but is still symptomatic with urgency and frequency. She states that in the past around August 2021 that urology had started her at 10mg and titrated down to 5mg Pt wondering if she can take 10mg until follow up with urology on 3/23

## 2023-03-20 NOTE — TELEPHONE ENCOUNTER
Patient has appointment with urology on 3/23 and wants to address concerns then    Caroline Causey RN

## 2023-03-23 ENCOUNTER — TRANSFERRED RECORDS (OUTPATIENT)
Dept: HEALTH INFORMATION MANAGEMENT | Facility: CLINIC | Age: 78
End: 2023-03-23

## 2023-04-11 ENCOUNTER — TELEPHONE (OUTPATIENT)
Dept: INTERNAL MEDICINE | Facility: CLINIC | Age: 78
End: 2023-04-11
Payer: COMMERCIAL

## 2023-04-11 DIAGNOSIS — N32.81 OVERACTIVE BLADDER: ICD-10-CM

## 2023-08-24 ASSESSMENT — ENCOUNTER SYMPTOMS
HEARTBURN: 0
DIARRHEA: 0
SHORTNESS OF BREATH: 0
DYSURIA: 0
MYALGIAS: 1
DIZZINESS: 1
ABDOMINAL PAIN: 0
SORE THROAT: 0
HEADACHES: 0
FREQUENCY: 0
ARTHRALGIAS: 1
CONSTIPATION: 0
NAUSEA: 0
HEMATOCHEZIA: 0
PARESTHESIAS: 0
NERVOUS/ANXIOUS: 0
COUGH: 0
FEVER: 0
HEMATURIA: 0
CHILLS: 0
JOINT SWELLING: 1
EYE PAIN: 0
WEAKNESS: 1
PALPITATIONS: 0

## 2023-08-24 ASSESSMENT — ACTIVITIES OF DAILY LIVING (ADL): CURRENT_FUNCTION: NO ASSISTANCE NEEDED

## 2023-08-31 ENCOUNTER — OFFICE VISIT (OUTPATIENT)
Dept: INTERNAL MEDICINE | Facility: CLINIC | Age: 78
End: 2023-08-31
Payer: COMMERCIAL

## 2023-08-31 VITALS
TEMPERATURE: 97.5 F | OXYGEN SATURATION: 96 % | HEART RATE: 65 BPM | WEIGHT: 255 LBS | SYSTOLIC BLOOD PRESSURE: 148 MMHG | BODY MASS INDEX: 40.02 KG/M2 | RESPIRATION RATE: 18 BRPM | DIASTOLIC BLOOD PRESSURE: 80 MMHG | HEIGHT: 67 IN

## 2023-08-31 DIAGNOSIS — M15.0 PRIMARY OSTEOARTHRITIS INVOLVING MULTIPLE JOINTS: ICD-10-CM

## 2023-08-31 DIAGNOSIS — Z00.00 ENCOUNTER FOR MEDICARE ANNUAL WELLNESS EXAM: Primary | ICD-10-CM

## 2023-08-31 DIAGNOSIS — M25.511 CHRONIC RIGHT SHOULDER PAIN: ICD-10-CM

## 2023-08-31 DIAGNOSIS — E66.812 CLASS 2 SEVERE OBESITY DUE TO EXCESS CALORIES WITH SERIOUS COMORBIDITY AND BODY MASS INDEX (BMI) OF 39.0 TO 39.9 IN ADULT (H): ICD-10-CM

## 2023-08-31 DIAGNOSIS — Z78.0 POSTMENOPAUSAL STATUS: ICD-10-CM

## 2023-08-31 DIAGNOSIS — Z86.0100 PERSONAL HISTORY OF COLONIC POLYPS: ICD-10-CM

## 2023-08-31 DIAGNOSIS — G89.29 CHRONIC RIGHT SHOULDER PAIN: ICD-10-CM

## 2023-08-31 DIAGNOSIS — N32.81 OVERACTIVE BLADDER: ICD-10-CM

## 2023-08-31 DIAGNOSIS — L65.9 HAIR LOSS: ICD-10-CM

## 2023-08-31 DIAGNOSIS — I10 ESSENTIAL HYPERTENSION: ICD-10-CM

## 2023-08-31 DIAGNOSIS — M53.3 SACROILIAC JOINT PAIN: ICD-10-CM

## 2023-08-31 DIAGNOSIS — E03.9 HYPOTHYROIDISM, UNSPECIFIED TYPE: ICD-10-CM

## 2023-08-31 DIAGNOSIS — E66.01 CLASS 2 SEVERE OBESITY DUE TO EXCESS CALORIES WITH SERIOUS COMORBIDITY AND BODY MASS INDEX (BMI) OF 39.0 TO 39.9 IN ADULT (H): ICD-10-CM

## 2023-08-31 DIAGNOSIS — E78.2 MIXED HYPERLIPIDEMIA: ICD-10-CM

## 2023-08-31 LAB
ALBUMIN SERPL BCG-MCNC: 4.8 G/DL (ref 3.5–5.2)
ALBUMIN UR-MCNC: NEGATIVE MG/DL
ALP SERPL-CCNC: 58 U/L (ref 35–104)
ALT SERPL W P-5'-P-CCNC: 19 U/L (ref 0–50)
ANION GAP SERPL CALCULATED.3IONS-SCNC: 13 MMOL/L (ref 7–15)
APPEARANCE UR: CLEAR
AST SERPL W P-5'-P-CCNC: 31 U/L (ref 0–45)
BILIRUB SERPL-MCNC: 0.6 MG/DL
BILIRUB UR QL STRIP: NEGATIVE
BUN SERPL-MCNC: 10.9 MG/DL (ref 8–23)
CALCIUM SERPL-MCNC: 10.1 MG/DL (ref 8.8–10.2)
CHLORIDE SERPL-SCNC: 102 MMOL/L (ref 98–107)
CHOLEST SERPL-MCNC: 197 MG/DL
COLOR UR AUTO: YELLOW
CREAT SERPL-MCNC: 0.9 MG/DL (ref 0.51–0.95)
DEPRECATED HCO3 PLAS-SCNC: 23 MMOL/L (ref 22–29)
ERYTHROCYTE [DISTWIDTH] IN BLOOD BY AUTOMATED COUNT: 12.2 % (ref 10–15)
GFR SERPL CREATININE-BSD FRML MDRD: 66 ML/MIN/1.73M2
GLUCOSE SERPL-MCNC: 102 MG/DL (ref 70–99)
GLUCOSE UR STRIP-MCNC: NEGATIVE MG/DL
HCT VFR BLD AUTO: 41 % (ref 35–47)
HDLC SERPL-MCNC: 61 MG/DL
HGB BLD-MCNC: 13.7 G/DL (ref 11.7–15.7)
HGB UR QL STRIP: NEGATIVE
KETONES UR STRIP-MCNC: NEGATIVE MG/DL
LDLC SERPL CALC-MCNC: 103 MG/DL
LEUKOCYTE ESTERASE UR QL STRIP: NEGATIVE
MCH RBC QN AUTO: 29.9 PG (ref 26.5–33)
MCHC RBC AUTO-ENTMCNC: 33.4 G/DL (ref 31.5–36.5)
MCV RBC AUTO: 90 FL (ref 78–100)
NITRATE UR QL: NEGATIVE
NONHDLC SERPL-MCNC: 136 MG/DL
PH UR STRIP: 5.5 [PH] (ref 5–8)
PLATELET # BLD AUTO: 213 10E3/UL (ref 150–450)
POTASSIUM SERPL-SCNC: 4.5 MMOL/L (ref 3.4–5.3)
PROT SERPL-MCNC: 7.5 G/DL (ref 6.4–8.3)
RBC # BLD AUTO: 4.58 10E6/UL (ref 3.8–5.2)
SODIUM SERPL-SCNC: 138 MMOL/L (ref 136–145)
SP GR UR STRIP: <=1.005 (ref 1–1.03)
TRIGL SERPL-MCNC: 165 MG/DL
TSH SERPL DL<=0.005 MIU/L-ACNC: 1.03 UIU/ML (ref 0.3–4.2)
UROBILINOGEN UR STRIP-ACNC: 0.2 E.U./DL
WBC # BLD AUTO: 6.9 10E3/UL (ref 4–11)

## 2023-08-31 PROCEDURE — G0439 PPPS, SUBSEQ VISIT: HCPCS | Performed by: INTERNAL MEDICINE

## 2023-08-31 PROCEDURE — 82306 VITAMIN D 25 HYDROXY: CPT | Performed by: INTERNAL MEDICINE

## 2023-08-31 PROCEDURE — 36415 COLL VENOUS BLD VENIPUNCTURE: CPT | Performed by: INTERNAL MEDICINE

## 2023-08-31 PROCEDURE — 80053 COMPREHEN METABOLIC PANEL: CPT | Performed by: INTERNAL MEDICINE

## 2023-08-31 PROCEDURE — 99214 OFFICE O/P EST MOD 30 MIN: CPT | Mod: 25 | Performed by: INTERNAL MEDICINE

## 2023-08-31 PROCEDURE — 81003 URINALYSIS AUTO W/O SCOPE: CPT | Performed by: INTERNAL MEDICINE

## 2023-08-31 PROCEDURE — 85027 COMPLETE CBC AUTOMATED: CPT | Performed by: INTERNAL MEDICINE

## 2023-08-31 PROCEDURE — 84443 ASSAY THYROID STIM HORMONE: CPT | Performed by: INTERNAL MEDICINE

## 2023-08-31 PROCEDURE — 80061 LIPID PANEL: CPT | Performed by: INTERNAL MEDICINE

## 2023-08-31 RX ORDER — LOSARTAN POTASSIUM AND HYDROCHLOROTHIAZIDE 12.5; 1 MG/1; MG/1
1 TABLET ORAL DAILY
Qty: 90 TABLET | Refills: 3 | Status: SHIPPED | OUTPATIENT
Start: 2023-08-31 | End: 2024-06-04

## 2023-08-31 ASSESSMENT — ENCOUNTER SYMPTOMS
PALPITATIONS: 0
DYSURIA: 0
DIZZINESS: 1
FEVER: 0
COUGH: 0
EYE PAIN: 0
FREQUENCY: 0
CHILLS: 0
HEARTBURN: 0
JOINT SWELLING: 1
WEAKNESS: 1
HEMATOCHEZIA: 0
HEADACHES: 0
NERVOUS/ANXIOUS: 0
DIARRHEA: 0
ARTHRALGIAS: 1
NAUSEA: 0
ABDOMINAL PAIN: 0
SORE THROAT: 0
HEMATURIA: 0
PARESTHESIAS: 0
MYALGIAS: 1
CONSTIPATION: 0
SHORTNESS OF BREATH: 0

## 2023-08-31 ASSESSMENT — ACTIVITIES OF DAILY LIVING (ADL): CURRENT_FUNCTION: NO ASSISTANCE NEEDED

## 2023-08-31 NOTE — PATIENT INSTRUCTIONS
Annual flu shot every fall     New COVID-vaccine this fall when available.  I would also consider getting the new RSV vaccine when available     Continue to monitor your blood pressure at home every 1 to 2 weeks     Restrict sodium to less than 2000 mg daily     Try to get at least 1200 mg of calcium daily in your diet     You can try OTC Biotin to help with hair and nails     Colonoscopy will be due December 2025     Annual mammogram     Follow-up with your dermatologist every year for a total-body skin exam     Annual eye exam     DEXA is being scheduled for osteoporosis screening     Patient Education   Personalized Prevention Plan  You are due for the preventive services outlined below.  Your care team is available to assist you in scheduling these services.  If you have already completed any of these items, please share that information with your care team to update in your medical record.  Health Maintenance Due   Topic Date Due    COVID-19 Vaccine (6 - Moderna series) 02/09/2023    Thyroid Function Lab  08/30/2023     Learning About Dietary Guidelines  What are the Dietary Guidelines for Americans?     Dietary Guidelines for Americans provide tips for eating well and staying healthy. This helps reduce the risk for long-term (chronic) diseases.  These guidelines recommend that you:  Eat and drink the right amount for you. The [place] government's food guide is called MyPlate. It can help you make your own well-balanced eating plan.  Try to balance your eating with your activity. This helps you stay at a healthy weight.  Drink alcohol in moderation, if at all.  Limit foods high in salt, saturated fat, trans fat, and added sugar.  These guidelines are from the [place] Department of Agriculture and the [place] Department of Health and Human Services. They are updated every 5 years.  What is MyPlate?  MyPlate is the [place] government's food guide. It can help you make your own well-balanced eating plan. A balanced eating  "plan means that you eat enough, but not too much, and that your food gives you the nutrients you need to stay healthy.  MyPlate focuses on eating plenty of whole grains, fruits, and vegetables, and on limiting fat and sugar. It is available online at www.ChooseMyPlate.gov.  How can you get started?  If you're trying to eat healthier, you can slowly change your eating habits over time. You don't have to make big changes all at once. Start by adding one or two healthy foods to your meals each day.  Grains  Choose whole-grain breads and cereals and whole-wheat pasta and whole-grain crackers.  Vegetables  Eat a variety of vegetables every day. They have lots of nutrients and are part of a heart-healthy diet.  Fruits  Eat a variety of fruits every day. Fruits contain lots of nutrients. Choose fresh fruit instead of fruit juice.  Protein foods  Choose fish and lean poultry more often. Eat red meat and fried meats less often. Dried beans, tofu, and nuts are also good sources of protein.  Dairy  Choose low-fat or fat-free products from this food group. If you have problems digesting milk, try eating cheese or yogurt instead.  Fats and oils  Limit fats and oils if you're trying to cut calories. Choose healthy fats when you cook. These include canola oil and olive oil.  Where can you learn more?  Go to https://www.[employer].net/patiented  Enter D676 in the search box to learn more about \"Learning About Dietary Guidelines.\"  Current as of: March 1, 2023               Content Version: 13.7    0349-6947 [employer].   Care instructions adapted under license by your healthcare professional. If you have questions about a medical condition or this instruction, always ask your healthcare professional. [employer] disclaims any warranty or liability for your use of this information.      Bladder Training: Care Instructions  Your Care Instructions     Bladder training is used to treat urge incontinence and " stress incontinence. Urge incontinence means that the need to urinate comes on so fast that you can't get to a toilet in time. Stress incontinence means that you leak urine because of pressure on your bladder. For example, it may happen when you laugh, cough, or lift something heavy.  Bladder training can increase how long you can wait before you have to urinate. It can also help your bladder hold more urine. And it can give you better control over the urge to urinate.  It is important to remember that bladder training takes a few weeks to a few months to make a difference. You may not see results right away, but don't give up.  Follow-up care is a key part of your treatment and safety. Be sure to make and go to all appointments, and call your doctor if you are having problems. It's also a good idea to know your test results and keep a list of the medicines you take.  How can you care for yourself at home?  Work with your doctor to come up with a bladder training program that is right for you. You may use one or more of the following methods.  Delayed urination  In the beginning, try to keep from urinating for 5 minutes after you first feel the need to go.  While you wait, take deep, slow breaths to relax. Kegel exercises can also help you delay the need to go to the bathroom.  After some practice, when you can easily wait 5 minutes to urinate, try to wait 10 minutes before you urinate.  Slowly increase the waiting period until you are able to control when you have to urinate.  Scheduled urination  Empty your bladder when you first wake up in the morning.  Schedule times throughout the day when you will urinate.  Start by going to the bathroom every hour, even if you don't need to go.  Slowly increase the time between trips to the bathroom.  When you have found a schedule that works well for you, keep doing it.  If you wake up during the night and have to urinate, do it. Apply your schedule to waking hours  "only.  Kegel exercises  These tighten and strengthen pelvic muscles, which can help you control the flow of urine. (If doing these exercises causes pain, stop doing them and talk with your doctor.) To do Kegel exercises:  Squeeze your muscles as if you were trying not to pass gas. Or squeeze your muscles as if you were stopping the flow of urine. Your belly, legs, and buttocks shouldn't move.  Hold the squeeze for 3 seconds, then relax for 5 to 10 seconds.  Start with 3 seconds, then add 1 second each week until you are able to squeeze for 10 seconds.  Repeat the exercise 10 times a session. Do 3 to 8 sessions a day.  When should you call for help?  Watch closely for changes in your health, and be sure to contact your doctor if:    Your incontinence is getting worse.     You do not get better as expected.   Where can you learn more?  Go to https://www.[employer]/patiented  Enter V684 in the search box to learn more about \"Bladder Training: Care Instructions.\"  Current as of: March 1, 2023               Content Version: 13.7    8730-2884 [employer].   Care instructions adapted under license by your healthcare professional. If you have questions about a medical condition or this instruction, always ask your healthcare professional. [employer] disclaims any warranty or liability for your use of this information.      Kegel Exercises: Care Instructions  Overview     Kegel exercises strengthen muscles around the bladder. These muscles control the flow of urine. Kegel exercises are sometime called \"pelvic floor\" exercises. They can help prevent urine leakage and keep the pelvic organs in place.  Kegel exercises can strengthen pelvic muscles that have been weakened by age, pregnancy, childbirth, and surgery. They may help prevent or treat urine leakage.  You do Kegel exercises by squeezing your pelvic floor muscles. You will likely need to do these exercises for several weeks to get " "better.  Follow-up care is a key part of your treatment and safety. Be sure to make and go to all appointments, and call your doctor if you are having problems. It's also a good idea to know your test results and keep a list of the medicines you take.  How can you care for yourself at home?  To do Kegel exercises:  Squeeze your muscles as if you were trying not to pass gas. Or squeeze your muscles as if you were stopping the flow of urine. Your belly, legs, and buttocks shouldn't move.  Hold the squeeze for 3 seconds, then relax for 5 to 10 seconds.  Start with 3 seconds, then add 1 second each week until you are able to squeeze for 10 seconds.  Repeat the exercise 10 times a session. Do 3 to 8 sessions a day.  When learning what muscles to squeeze, you can try stopping the flow of urine a few times. But don't make it a practice to do Kegels while urinating.  If doing these exercises causes pain, stop doing them and talk with your doctor. Sometimes people have pelvic floor muscles that are too tight. In these cases, doing Kegel exercises may cause more problems.  Check with your doctor if you don't notice a difference after trying these exercises for several weeks. Your doctor may suggest getting help from a physical therapist or recommend other treatment.  Where can you learn more?  Go to https://www.[employer].net/patiented  Enter Q681 in the search box to learn more about \"Kegel Exercises: Care Instructions.\"  Current as of: March 1, 2023               Content Version: 13.7    0938-2359 [employer].   Care instructions adapted under license by your healthcare professional. If you have questions about a medical condition or this instruction, always ask your healthcare professional. [employer] disclaims any warranty or liability for your use of this information.         Learning About Being Physically Active  What is physical activity?     Being physically active means doing any kind of " "activity that gets your body moving.  The types of physical activity that can help you get fit and stay healthy include:  Aerobic or \"cardio\" activities. These make your heart beat faster and make you breathe harder, such as brisk walking, riding a bike, or running. They strengthen your heart and lungs and build up your endurance.  Strength training activities. These make your muscles work against, or \"resist,\" something. Examples include lifting weights or doing push-ups. These activities help tone and strengthen your muscles and bones.  Stretches. These let you move your joints and muscles through their full range of motion. Stretching helps you be more flexible.  Reaching a balance between these three types of physical activity is important because each one contributes to your overall fitness.  What are the benefits of being active?  Being active is one of the best things you can do for your health. It helps you to:  Feel stronger and have more energy to do all the things you like to do.  Focus better at school or work.  Feel, think, and sleep better.  Reach and stay at a healthy weight.  Lose fat and build lean muscle.  Lower your risk for serious health problems, including diabetes, heart attack, high blood pressure, and some cancers.  Keep your heart, lungs, bones, muscles, and joints strong and healthy.  How can you make being active part of your life?  Start slowly. Make it your long-term goal to get at least 30 minutes of exercise on most days of the week. Walking is a good choice. You also may want to do other activities, such as running, swimming, cycling, or playing tennis or team sports.  Pick activities that you like--ones that make your heart beat faster, your muscles stronger, and your muscles and joints more flexible. If you find more than one thing you like doing, do them all. You don't have to do the same thing every day.  Get your heart pumping every day. Any activity that makes your heart beat " "faster and keeps it at that rate for a while counts.  Here are some great ways to get your heart beating faster:  Go for a brisk walk, run, or bike ride.  Go for a hike or swim.  Go in-line skating.  Play a game of touch football, basketball, or soccer.  Ride a bike.  Play tennis or racquetball.  Climb stairs.  Even some household chores can be aerobic--just do them at a faster pace. Vacuuming, raking or mowing the lawn, sweeping the garage, and washing and waxing the car all can help get your heart rate up.  Strengthen your muscles during the week. You don't have to lift heavy weights or grow big, bulky muscles to get stronger. Doing a few simple activities that make your muscles work against, or \"resist,\" something can help you get stronger.  For example, you can:  Do push-ups or sit-ups, which use your own body weight as resistance.  Lift weights or dumbbells or use stretch bands at home or in a gym or community center.  Stretch your muscles often. Stretching will help you as you become more active. It can help you stay flexible, loosen tight muscles, and avoid injury. It can also help improve your balance and posture and can be a great way to relax.  Be sure to stretch the muscles you'll be using when you work out. It's best to warm your muscles slightly before you stretch them. Walk or do some other light aerobic activity for a few minutes, and then start stretching.  When you stretch your muscles:  Do it slowly. Stretching is not about going fast or making sudden movements.  Don't push or bounce during a stretch.  Hold each stretch for at least 15 to 30 seconds, if you can. You should feel a stretch in the muscle, but not pain.  Breathe out as you do the stretch. Then breathe in as you hold the stretch. Don't hold your breath.  If you're worried about how more activity might affect your health, have a checkup before you start. Follow any special advice your doctor gives you for getting a smart start.  Where " "can you learn more?  Go to https://www.[employer].net/patiented  Enter W332 in the search box to learn more about \"Learning About Being Physically Active.\"  Current as of: October 10, 2022               Content Version: 13.7    9538-8191 [employer].   Care instructions adapted under license by your healthcare professional. If you have questions about a medical condition or this instruction, always ask your healthcare professional. [employer] disclaims any warranty or liability for your use of this information.      "

## 2023-08-31 NOTE — PROGRESS NOTES
"SUBJECTIVE:   Qian is a 77 year old who presents for Preventive Visit.    77-year-old woman here for annual wellness visit and to follow-up medical problems including hypertension, hypothyroidism, and hypercholesterolemia.  Several other medical conditions discussed.  See assessment and plan for details.          8/31/2023     2:30 PM   Additional Questions   Roomed by        Are you in the first 12 months of your Medicare coverage?  No    Healthy Habits:     In general, how would you rate your overall health?  Good    Frequency of exercise:  2-3 days/week    Duration of exercise:  Less than 15 minutes    Do you usually eat at least 4 servings of fruit and vegetables a day, include whole grains    & fiber and avoid regularly eating high fat or \"junk\" foods?  No    Taking medications regularly:  Yes    Medication side effects:  None    Ability to successfully perform activities of daily living:  No assistance needed    Home Safety:  No safety concerns identified    Hearing Impairment:  Difficult to understand a speaker at a public meeting or [employer] service and need to ask people to speak up or repeat themselves    In the past 6 months, have you been bothered by leaking of urine? Yes    In general, how would you rate your overall mental or emotional health?  Excellent    Additional concerns today:  Yes        Have you ever done Advance Care Planning? (For example, a Health Directive, POLST, or a discussion with a medical provider or your loved ones about your wishes): Yes, patient states has an Advance Care Planning document and will bring a copy to the clinic.       Fall risk  Fallen 2 or more times in the past year?: Yes  Any fall with injury in the past year?: Yes    Cognitive Screening   1) Repeat 3 items (Leader, Season, Table)    2) Clock draw: NORMAL  3) 3 item recall: Recalls 3 objects  Results: 3 items recalled: COGNITIVE IMPAIRMENT LESS LIKELY    Mini-CogTM Copyright S Pritesh. Licensed by the author " for use in [facility]; reprinted with permission (bridgette@.[facility]). All rights reserved.      Do you have sleep apnea, excessive snoring or daytime drowsiness? : no    Reviewed and updated as needed this visit by clinical staff   Tobacco  Allergies  Meds  Problems  Med Hx  Surg Hx  Fam Hx          Reviewed and updated as needed this visit by Provider   Tobacco  Allergies  Meds  Problems  Med Hx  Surg Hx  Fam Hx         Social History     Tobacco Use    Smoking status: Never    Smokeless tobacco: Never   Substance Use Topics    Alcohol use: Yes     Comment: infrequent             8/24/2023     3:29 PM   Alcohol Use   Prescreen: >3 drinks/day or >7 drinks/week? No     Do you have a current opioid prescription? No  Do you use any other controlled substances or medications that are not prescribed by a provider? Alcohol              Current providers sharing in care for this patient include:   Patient Care Team:  Chilo Harris MD as PCP - General (Internal Medicine)  Chilo Harris MD as Assigned PCP    The following health maintenance items are reviewed in Epic and correct as of today:  Health Maintenance   Topic Date Due    COVID-19 Vaccine (6 - Moderna series) 02/09/2023    TSH W/FREE T4 REFLEX  08/30/2023    INFLUENZA VACCINE (1) 09/01/2023    MEDICARE ANNUAL WELLNESS VISIT  08/31/2024    ANNUAL REVIEW OF HM ORDERS  08/31/2024    FALL RISK ASSESSMENT  08/31/2024    DTAP/TDAP/TD IMMUNIZATION (2 - Td or Tdap) 03/06/2027    LIPID  08/30/2027    ADVANCE CARE PLANNING  08/31/2028    DEXA  04/27/2033    PHQ-2 (once per calendar year)  Completed    Pneumococcal Vaccine: 65+ Years  Completed    ZOSTER IMMUNIZATION  Completed    IPV IMMUNIZATION  Aged Out    HPV IMMUNIZATION  Aged Out    MENINGITIS IMMUNIZATION  Aged Out    HEPATITIS C SCREENING  Discontinued    MAMMO SCREENING  Discontinued    COLORECTAL CANCER SCREENING  Discontinued     Lab work is in process        Pertinent mammograms  "are reviewed under the imaging tab.    Review of Systems   Constitutional:  Negative for chills and fever.   HENT:  Negative for congestion, ear pain, hearing loss and sore throat.    Eyes:  Positive for visual disturbance. Negative for pain.   Respiratory:  Negative for cough and shortness of breath.    Cardiovascular:  Negative for chest pain, palpitations and peripheral edema.   Gastrointestinal:  Negative for abdominal pain, constipation, diarrhea, heartburn, hematochezia and nausea.   Breasts:  Negative for tenderness and discharge.   Genitourinary:  Negative for dysuria, frequency, genital sores, hematuria, pelvic pain, urgency, vaginal bleeding and vaginal discharge.   Musculoskeletal:  Positive for arthralgias, joint swelling and myalgias.   Skin:  Negative for rash.   Neurological:  Positive for dizziness and weakness. Negative for headaches and paresthesias.   Psychiatric/Behavioral:  Negative for mood changes. The patient is not nervous/anxious.          OBJECTIVE:   BP (!) 148/80   Pulse 65   Temp 97.5  F (36.4  C) (Oral)   Resp 18   Ht 1.702 m (5' 7\")   Wt 115.7 kg (255 lb)   SpO2 96%   BMI 39.94 kg/m   Estimated body mass index is 39.94 kg/m  as calculated from the following:    Height as of this encounter: 1.702 m (5' 7\").    Weight as of this encounter: 115.7 kg (255 lb).  Physical Exam  EYES: Eyelids, conjunctiva, and sclera were normal. Pupils were normal. Cornea, iris, and lens were normal bilaterally.  HEAD, EARS, NOSE, MOUTH, AND THROAT: Head and face were normal. TMs and external auditory canals are normal  NECK: Neck appearance was normal. There were no neck masses and the thyroid was not enlarged and no nodules are felt.  No lymphadenopathy.  RESPIRATORY: Breathing pattern was normal and the chest moved symmetrically.   Lung sounds were normal and there were no rales or wheezes.  CARDIOVASCULAR: Heart rate and rhythm were normal.  S1 and S2 were normal and there were no extra sounds or " murmurs. Peripheral pulses in arms and legs were normal.  There was no peripheral edema.  No carotid bruits.  GASTROINTESTINAL:  Bowel sounds were present.   Palpation detected no tenderness, mass, or enlarged organs.   MUSCULOSKELETAL: Skeletal configuration was normal and muscle mass was normal for age. Joint appearance was overall normal.  LYMPHATIC: There were no enlarged nodes.  SKIN/HAIR/NAILS: Skin color was normal.  There were no concerning skin lesions.  NEUROLOGIC: The patient was alert and oriented to person, place, time, and circumstance. Speech was normal. Cranial nerves were normal. Motor strength was normal for age. The patient was normally coordinated.  Sensation intact.  PSYCHIATRIC:  Mood and affect were normal and the patient had normal recent and remote memory. The patient's judgment and insight were normal.         ASSESSMENT / PLAN:   1. Encounter for Medicare annual wellness exam  Immunizations are reviewed and recommending annual flu shot along with new COVID-vaccine and RSV vaccine when available this fall.  She has a living will.  Non-smoker.  Uses alcohol in moderation.  Regular exercise and good diet habits to maintain a healthy weight discussed.  Up to date with colonoscopies and this should be repeated in December 2025.  Recommending annual mammogram for breast cancer screening.   Last DEXA was 2018 and should be repeated this year. Dementia and depression screening completed.  Recommending annual eye exam.  Recommending seeing a dentist every 6 months.  Skin exam performed and recommending regular use of sunblock.  She sees a dermatologist every year.  Hepatitis C antibody for screening was normal.  Will screen for diabetes with fasting glucose.    2. Essential hypertension  Blood pressure is not adequately controlled despite losartan 100 mg daily.  We discussed the importance of lifestyle modification including sodium restriction, regular exercise and weight loss.  However, we will  also begin losartan/HCTZ 100/12.5 mg daily.  We will have her back in 4 weeks for a blood pressure recheck and to recheck her labs.  - CBC with platelets; Future  - Comprehensive metabolic panel (BMP + Alb, Alk Phos, ALT, AST, Total. Bili, TP); Future  - UA Macroscopic with reflex to Microscopic and Culture - Lab Collect; Future  - losartan-hydrochlorothiazide (HYZAAR) 100-12.5 MG tablet; Take 1 tablet by mouth daily  Dispense: 90 tablet; Refill: 3    3. Class 2 severe obesity due to excess calories with serious comorbidity and body mass index (BMI) of 39.0 to 39.9 in adult (H)  Diet and exercise discussed.  She has participated in weight watchers.    4. Hypothyroidism, unspecified type  Recheck TSH and adjust dose of levothyroxine if necessary.  She complains of hair loss and brittle nails  - TSH; Future    5. Mixed hyperlipidemia  Recheck lipid profile taking pravastatin  - Lipid Profile (Chol, Trig, HDL, LDL calc); Future    6. Chronic right shoulder pain  Severe arthritis involving right shoulder and chronically torn rotator cuff.  Further injury after a fall last winter.  Improved with physical therapy and followed by orthopedics    7. Sacroiliac joint pain  Increasing left SI joint pain.  Follow-up with orthopedics    8. Primary osteoarthritis involving multiple joints  She has had both knees replaced.  Recommending Tylenol rather than ibuprofen to help with chronic pain management    9. Overactive bladder  Using oxybutynin    10. Personal history of colonic polyps  Colonoscopy with multiple polyps December 2022 with recommendation to repeat in 3 years    11. Hair loss  Hair loss and more brittle nails.  Checking vitamin D level.  We will make sure levothyroxine dose is appropriate.  She can try OTC Biotene.  Consider side effect of losartan.  - Vitamin D deficiency screening; Future    12. Postmenopausal status    - DX Hip/Pelvis/Spine; Future     Patient has been advised of split billing requirements and  "indicates understanding: Yes        BMI:   Estimated body mass index is 39.94 kg/m  as calculated from the following:    Height as of this encounter: 1.702 m (5' 7\").    Weight as of this encounter: 115.7 kg (255 lb).         She reports that she has never smoked. She has never used smokeless tobacco.      Appropriate preventive services were discussed with this patient, including applicable screening as appropriate for cardiovascular disease, diabetes, osteopenia/osteoporosis, and glaucoma.  As appropriate for age/gender, discussed screening for colorectal cancer, prostate cancer, breast cancer, and cervical cancer. Checklist reviewing preventive services available has been given to the patient.    Reviewed patients plan of care and provided an AVS. The Basic Care Plan (routine screening as documented in Health Maintenance) for Qian meets the Care Plan requirement. This Care Plan has been established and reviewed with the Patient.          Chilo Harris MD  [facility]    Identified Health Risks:  I have reviewed Opioid Use Disorder and Substance Use Disorder risk factors and made any needed referrals. The patient was counseled and encouraged to consider modifying their diet and eating habits. She was provided with information on recommended healthy diet options.  Information on urinary incontinence and treatment options given to patient.  She is at risk for lack of exercise and has been provided with information to increase physical activity for the benefit of her well-being.  "

## 2023-09-01 DIAGNOSIS — E55.9 VITAMIN D DEFICIENCY: Primary | ICD-10-CM

## 2023-09-01 LAB — DEPRECATED CALCIDIOL+CALCIFEROL SERPL-MC: 26 UG/L (ref 20–75)

## 2023-09-01 RX ORDER — CHOLECALCIFEROL (VITAMIN D3) 50 MCG
1 TABLET ORAL DAILY
COMMUNITY
Start: 2023-09-01

## 2023-09-07 ENCOUNTER — TRANSFERRED RECORDS (OUTPATIENT)
Dept: HEALTH INFORMATION MANAGEMENT | Facility: CLINIC | Age: 78
End: 2023-09-07
Payer: COMMERCIAL

## 2023-09-12 ENCOUNTER — TRANSFERRED RECORDS (OUTPATIENT)
Dept: HEALTH INFORMATION MANAGEMENT | Facility: CLINIC | Age: 78
End: 2023-09-12
Payer: COMMERCIAL

## 2023-09-15 ENCOUNTER — TRANSFERRED RECORDS (OUTPATIENT)
Dept: HEALTH INFORMATION MANAGEMENT | Facility: CLINIC | Age: 78
End: 2023-09-15
Payer: COMMERCIAL

## 2023-09-21 ENCOUNTER — DOCUMENTATION ONLY (OUTPATIENT)
Dept: INTERNAL MEDICINE | Facility: CLINIC | Age: 78
End: 2023-09-21
Payer: COMMERCIAL

## 2023-09-21 DIAGNOSIS — I10 ESSENTIAL HYPERTENSION: Primary | ICD-10-CM

## 2023-09-21 DIAGNOSIS — Z51.81 ENCOUNTER FOR THERAPEUTIC DRUG MONITORING: ICD-10-CM

## 2023-09-25 DIAGNOSIS — E78.2 MIXED HYPERLIPIDEMIA: ICD-10-CM

## 2023-09-25 DIAGNOSIS — E03.4 HYPOTHYROIDISM DUE TO ACQUIRED ATROPHY OF THYROID: ICD-10-CM

## 2023-09-26 RX ORDER — LEVOTHYROXINE SODIUM 125 UG/1
TABLET ORAL
Qty: 90 TABLET | Refills: 2 | Status: SHIPPED | OUTPATIENT
Start: 2023-09-26 | End: 2024-06-04

## 2023-09-26 RX ORDER — PRAVASTATIN SODIUM 20 MG
TABLET ORAL
Qty: 90 TABLET | Refills: 3 | Status: SHIPPED | OUTPATIENT
Start: 2023-09-26 | End: 2024-08-26

## 2023-10-02 ENCOUNTER — ALLIED HEALTH/NURSE VISIT (OUTPATIENT)
Dept: FAMILY MEDICINE | Facility: CLINIC | Age: 78
End: 2023-10-02
Payer: COMMERCIAL

## 2023-10-02 ENCOUNTER — LAB (OUTPATIENT)
Dept: LAB | Facility: CLINIC | Age: 78
End: 2023-10-02
Payer: COMMERCIAL

## 2023-10-02 VITALS — SYSTOLIC BLOOD PRESSURE: 140 MMHG | DIASTOLIC BLOOD PRESSURE: 72 MMHG

## 2023-10-02 DIAGNOSIS — I10 ESSENTIAL HYPERTENSION: Primary | ICD-10-CM

## 2023-10-02 DIAGNOSIS — Z51.81 ENCOUNTER FOR THERAPEUTIC DRUG MONITORING: ICD-10-CM

## 2023-10-02 DIAGNOSIS — I10 ESSENTIAL HYPERTENSION: ICD-10-CM

## 2023-10-02 LAB
ANION GAP SERPL CALCULATED.3IONS-SCNC: 11 MMOL/L (ref 7–15)
BUN SERPL-MCNC: 12.6 MG/DL (ref 8–23)
CALCIUM SERPL-MCNC: 10.1 MG/DL (ref 8.8–10.2)
CHLORIDE SERPL-SCNC: 102 MMOL/L (ref 98–107)
CREAT SERPL-MCNC: 0.98 MG/DL (ref 0.51–0.95)
DEPRECATED HCO3 PLAS-SCNC: 27 MMOL/L (ref 22–29)
EGFRCR SERPLBLD CKD-EPI 2021: 59 ML/MIN/1.73M2
GLUCOSE SERPL-MCNC: 106 MG/DL (ref 70–99)
MAGNESIUM SERPL-MCNC: 2.2 MG/DL (ref 1.7–2.3)
POTASSIUM SERPL-SCNC: 4.9 MMOL/L (ref 3.4–5.3)
SODIUM SERPL-SCNC: 140 MMOL/L (ref 135–145)

## 2023-10-02 PROCEDURE — 99207 PR NO CHARGE NURSE ONLY: CPT

## 2023-10-02 PROCEDURE — 36415 COLL VENOUS BLD VENIPUNCTURE: CPT

## 2023-10-02 PROCEDURE — 80048 BASIC METABOLIC PNL TOTAL CA: CPT

## 2023-10-02 PROCEDURE — 83735 ASSAY OF MAGNESIUM: CPT

## 2023-10-02 NOTE — PROGRESS NOTES
I met with Qian Lee at the request of Dr. Land to recheck her blood pressure.  Blood pressure medications on the med list were reviewed with patient.    Patient has taken all medications as per usual regimen: Yes  Patient reports tolerating them without any issues or concerns: Yes    Vitals:    10/02/23 1043 10/02/23 1048   BP: (!) 176/80 (!) 160/70   BP Location: Left arm Left arm   Patient Position: Sitting Sitting   Cuff Size: Adult Large Adult Regular       After 5 minutes, the patient's blood pressure remained greater than or equal to 140/90.    Is the patient currently having any chest pain? No  Does the patient currently have a headache? No  Does the patient currently have any vision changes? No  Does the patient currently have any nausea? No  Does the patient currently have any abdominal pain? No    The previous encounter was reviewed.  The patient was discharged and the note will be sent to the provider for final review.    Routing to Dr. Harris for review.    Rose Sandoval, RN, BSN

## 2023-10-05 ENCOUNTER — TRANSFERRED RECORDS (OUTPATIENT)
Dept: HEALTH INFORMATION MANAGEMENT | Facility: CLINIC | Age: 78
End: 2023-10-05
Payer: COMMERCIAL

## 2023-10-10 ENCOUNTER — TRANSFERRED RECORDS (OUTPATIENT)
Dept: HEALTH INFORMATION MANAGEMENT | Facility: CLINIC | Age: 78
End: 2023-10-10
Payer: COMMERCIAL

## 2023-10-12 ENCOUNTER — TRANSFERRED RECORDS (OUTPATIENT)
Dept: HEALTH INFORMATION MANAGEMENT | Facility: CLINIC | Age: 78
End: 2023-10-12
Payer: COMMERCIAL

## 2023-10-17 ENCOUNTER — MEDICAL CORRESPONDENCE (OUTPATIENT)
Dept: HEALTH INFORMATION MANAGEMENT | Facility: CLINIC | Age: 78
End: 2023-10-17

## 2023-10-17 ENCOUNTER — ANCILLARY PROCEDURE (OUTPATIENT)
Dept: BONE DENSITY | Facility: CLINIC | Age: 78
End: 2023-10-17
Attending: INTERNAL MEDICINE
Payer: COMMERCIAL

## 2023-10-17 ENCOUNTER — OFFICE VISIT (OUTPATIENT)
Dept: INTERNAL MEDICINE | Facility: CLINIC | Age: 78
End: 2023-10-17
Payer: COMMERCIAL

## 2023-10-17 VITALS
HEIGHT: 67 IN | DIASTOLIC BLOOD PRESSURE: 66 MMHG | BODY MASS INDEX: 38.33 KG/M2 | WEIGHT: 244.2 LBS | TEMPERATURE: 97.5 F | RESPIRATION RATE: 16 BRPM | OXYGEN SATURATION: 97 % | SYSTOLIC BLOOD PRESSURE: 130 MMHG | HEART RATE: 65 BPM

## 2023-10-17 DIAGNOSIS — Z01.818 PREOP GENERAL PHYSICAL EXAM: Primary | ICD-10-CM

## 2023-10-17 DIAGNOSIS — H18.453 SALZMANN'S NODULAR DEGENERATION OF CORNEAS OF BOTH EYES: ICD-10-CM

## 2023-10-17 DIAGNOSIS — I10 ESSENTIAL HYPERTENSION: ICD-10-CM

## 2023-10-17 DIAGNOSIS — E03.9 HYPOTHYROIDISM, UNSPECIFIED TYPE: ICD-10-CM

## 2023-10-17 DIAGNOSIS — Z78.0 POSTMENOPAUSAL STATUS: ICD-10-CM

## 2023-10-17 LAB — POTASSIUM SERPL-SCNC: 3.9 MMOL/L (ref 3.4–5.3)

## 2023-10-17 PROCEDURE — 36415 COLL VENOUS BLD VENIPUNCTURE: CPT | Performed by: INTERNAL MEDICINE

## 2023-10-17 PROCEDURE — 99214 OFFICE O/P EST MOD 30 MIN: CPT | Performed by: INTERNAL MEDICINE

## 2023-10-17 PROCEDURE — 77080 DXA BONE DENSITY AXIAL: CPT | Mod: TC | Performed by: PHYSICIAN ASSISTANT

## 2023-10-17 PROCEDURE — 84132 ASSAY OF SERUM POTASSIUM: CPT | Performed by: INTERNAL MEDICINE

## 2023-10-17 RX ORDER — GABAPENTIN 300 MG/1
300 CAPSULE ORAL AT BEDTIME
COMMUNITY
Start: 2023-10-12 | End: 2023-12-07

## 2023-10-17 RX ORDER — MOXIFLOXACIN 5 MG/ML
SOLUTION/ DROPS OPHTHALMIC
COMMUNITY
Start: 2023-10-10 | End: 2023-12-07

## 2023-10-17 RX ORDER — KETOROLAC TROMETHAMINE 5 MG/ML
SOLUTION OPHTHALMIC
COMMUNITY
Start: 2023-10-10 | End: 2023-12-07

## 2023-10-17 RX ORDER — RESPIRATORY SYNCYTIAL VIRUS VACCINE 120MCG/0.5
0.5 KIT INTRAMUSCULAR ONCE
Qty: 1 EACH | Refills: 0 | Status: CANCELLED | OUTPATIENT
Start: 2023-10-17 | End: 2023-10-17

## 2023-10-17 RX ORDER — PREDNISOLONE ACETATE 10 MG/ML
SUSPENSION/ DROPS OPHTHALMIC
COMMUNITY
Start: 2023-10-10 | End: 2023-12-07

## 2023-10-17 NOTE — PROGRESS NOTES
[facility]  [address] [address]  Phone: [phone]  Fax: [phone]  Primary Provider: Chilo Harris  Pre-op Performing Provider: CHILO HARRIS      PREOPERATIVE EVALUATION:  Today's date: 10/17/2023    Qian is a 78 year old female who presents for a preoperative evaluation.      10/17/2023     7:24 AM   Additional Questions   Roomed by        Surgical Information:  Surgery/Procedure: SUPERFICIAL KERARTECTOMY RIGHT EYE  Surgery Location: [facility]  Surgeon:DR. SCHMIDT  Surgery Date: 10/18/23  Time of Surgery:   Where patient plans to recover: At home with family  Fax number for surgical facility: [phone]    Assessment & Plan     The proposed surgical procedure is considered LOW risk.    Preop general physical exam  78-year-old woman here for preop clearance prior to having superficial keratectomy of right eye.  She has tolerated previous surgery and anesthesia without any complications.  Overall risk is low and she is cleared to proceed with surgery and anesthesia as planned.  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  Rechecking potassium which was stable earlier this month.  She is instructed to hold her usual blood pressure medication on the morning of surgery to avoid perioperative hypotension but she may resume later in the day.  - Potassium; Future    Hypothyroidism, unspecified type  She is taking levothyroxine daily and can either take the medication on the morning of surgery with a small sip of water or hold until later in the day.               Antiplatelet or Anticoagulation Medication Instructions:   - Patient is on no antiplatelet or anticoagulation medications.    Additional Medication  Instructions:  Avoid aspirin and over-the-counter NSAIDs including ibuprofen, Motrin, Advil and Aleve during the week prior to surgery    On the morning of surgery, hold your usual medications including losartan/HCTZ but you may resume everything later in the day following surgery    RECOMMENDATION:  APPROVAL GIVEN to proceed with proposed procedure, without further diagnostic evaluation.            Subjective       HPI related to upcoming procedure: 78-year-old woman here for preop prior to having eye surgery.  See assessment and plan for details.        10/11/2023     4:18 PM   Preop Questions   1. Have you ever had a heart attack or stroke? No   2. Have you ever had surgery on your heart or blood vessels, such as a stent placement, a coronary artery bypass, or surgery on an artery in your head, neck, heart, or legs? No   3. Do you have chest pain with activity? No   4. Do you have a history of  heart failure? No   5. Do you currently have a cold, bronchitis or symptoms of other infection? No   6. Do you have a cough, shortness of breath, or wheezing? No   7. Do you or anyone in your family have previous history of blood clots? No   8. Do you or does anyone in your family have a serious bleeding problem such as prolonged bleeding following surgeries or cuts? No   9. Have you ever had problems with anemia or been told to take iron pills? No   10. Have you had any abnormal blood loss such as black, tarry or bloody stools, or abnormal vaginal bleeding? No   11. Have you ever had a blood transfusion? No   12. Are you willing to have a blood transfusion if it is medically needed before, during, or after your surgery? Yes   13. Have you or any of your relatives ever had problems with anesthesia? No   14. Do you have sleep apnea, excessive snoring or daytime drowsiness? No   15. Do you have any artifical heart valves or other implanted medical devices like a pacemaker, defibrillator, or continuous glucose monitor? No    16. Do you have artificial joints? YES -bilateral knee replacement   17. Are you allergic to latex? No       Health Care Directive:  Patient does not have a Health Care Directive or Living Will:       Review of systems  12 point review of systems is negative other than what is discussed in the assessment and plan    No exertional chest pain or increasing shortness of breath    Patient Active Problem List    Diagnosis Date Noted    Vitamin D deficiency 09/01/2023     Priority: Medium    Overactive bladder 08/31/2023     Priority: Medium    Sacroiliac joint pain 08/31/2023     Priority: Medium    Personal history of colonic polyps 01/24/2023     Priority: Medium     Colonoscopy November 2019 with multiple polyps.  Colonoscopy December 2022 again with multiple polyps, repeat in 3 years      Class 2 severe obesity due to excess calories with serious comorbidity in adult (H) 08/30/2022     Priority: Medium    Essential hypertension 08/30/2022     Priority: Medium    Chronic right shoulder pain 08/30/2021     Priority: Medium     Chronic rotator cuff tear      History of bilateral knee replacement 08/30/2021     Priority: Medium    SNHL (sensorineural hearing loss) 12/06/2016     Priority: Medium    Tinnitus of both ears 12/06/2016     Priority: Medium    Hyperlipidemia 07/19/2016     Priority: Medium    Osteoarthritis of multiple joints 07/19/2016     Priority: Medium     Bilateral TKA      Hypothyroidism 07/19/2016     Priority: Medium      Past Medical History:   Diagnosis Date    Chronic right shoulder pain 08/30/2021    Chronic rotator cuff tear    Class 2 severe obesity due to excess calories with serious comorbidity in adult (H) 08/30/2022    Essential hypertension 08/30/2022    History of bilateral knee replacement 08/30/2021    Hyperlipidemia 07/19/2016    Hypothyroidism 07/19/2016    Obesity (BMI 35.0-39.9 without comorbidity) 07/19/2016    Osteoarthritis of multiple joints 07/19/2016    B TKA    Overactive  bladder 08/31/2023    Personal history of colonic polyps     Colonoscopy November 2019 with multiple polyps.  Colonoscopy December 2022 again with multiple polyps, repeat in 3 years    Sacroiliac joint pain 08/31/2023    Screening for osteoporosis     normal DXA 2018    SNHL (sensorineural hearing loss) 12/06/2016    Tinnitus of both ears 12/06/2016    Vitamin D deficiency 09/01/2023     Past Surgical History:   Procedure Laterality Date    CATARACT EXTRACTION Bilateral     HYSTERECTOMY Bilateral 01/01/1995    OOPHORECTOMY      TOE SURGERY      hammertoes    TOTAL KNEE ARTHROPLASTY Bilateral 01/01/2008     Current Outpatient Medications   Medication Sig Dispense Refill    acetaminophen (TYLENOL) 500 MG tablet Acetaminophen Extra Strength 500 mg tablet   500mg 2/day      clindamycin (CLEOCIN) 150 MG capsule       fish oil-omega-3 fatty acids 1000 MG capsule Take 1 capsule by mouth daily      levothyroxine (SYNTHROID/LEVOTHROID) 125 MCG tablet TAKE 1 TABLET DAILY (OVERDUE FOR TSH) 90 tablet 2    losartan-hydrochlorothiazide (HYZAAR) 100-12.5 MG tablet Take 1 tablet by mouth daily 90 tablet 3    oxybutynin ER (DITROPAN XL) 5 MG 24 hr tablet Take 1 tablet (5 mg) by mouth daily 30 tablet 0    pravastatin (PRAVACHOL) 20 MG tablet TAKE 1 TABLET EVERY EVENING 90 tablet 3    vitamin D3 (CHOLECALCIFEROL) 50 mcg (2000 units) tablet Take 1 tablet (50 mcg) by mouth daily      gabapentin (NEURONTIN) 300 MG capsule  (Patient not taking: Reported on 10/17/2023)      ketorolac (ACULAR) 0.5 % ophthalmic solution  (Patient not taking: Reported on 10/17/2023)      moxifloxacin (VIGAMOX) 0.5 % ophthalmic solution  (Patient not taking: Reported on 10/17/2023)      prednisoLONE acetate (PRED FORTE) 1 % ophthalmic suspension  (Patient not taking: Reported on 10/17/2023)         Allergies   Allergen Reactions    Penicillin [Penicillin G] Hives     As a child    Nitrofurantoin Nausea    Statins-Hmg-Coa Reductase Inhibitors [Statins] Muscle  "Pain (Myalgia)        Social History     Tobacco Use    Smoking status: Never     Passive exposure: Never    Smokeless tobacco: Never   Substance Use Topics    Alcohol use: Yes     Comment: infrequent     Family History   Problem Relation Age of Onset    Peripheral Vascular Disease Mother         d 93yo    Coronary Artery Disease Mother     Coronary Artery Disease Father         d 84yo    Rectal Cancer Father     ALS Sister     Hypertension Sister     Melanoma Brother      History   Drug Use Not on file         Objective     /66 (BP Location: Left arm, Patient Position: Sitting, Cuff Size: Adult Large)   Pulse 65   Temp 97.5  F (36.4  C) (Oral)   Resp 16   Ht 1.702 m (5' 7\")   Wt 110.8 kg (244 lb 3.2 oz)   SpO2 97%   BMI 38.25 kg/m      Physical Exam  GENERAL APPEARANCE: Overweight but otherwise well-appearing middle-aged woman  HENT: Normal  RESP: lungs clear to auscultation - no rales, rhonchi or wheezes  CV: regular rate and rhythm, normal S1 S2, no S3 or S4 and no murmur, click or rub.  No carotid bruits.  No edema  ABDOMEN: soft, nontender, no HSM or masses and bowel sounds normal  NEURO: Normal strength and tone, sensory exam grossly normal, mentation intact and speech normal    Recent Labs   Lab Test 10/02/23  1118 08/31/23  1546 09/30/22  1157 08/30/22  1141   HGB  --  13.7  --  15.1   PLT  --  213  --  196    138   < > 139   POTASSIUM 4.9 4.5   < > 4.8   CR 0.98* 0.90   < > 0.86    < > = values in this interval not displayed.        Diagnostics:  Labs-  Potassium 3.9     No EKG required for low risk surgery (cataract, skin procedure, breast biopsy, etc).    Revised Cardiac Risk Index (RCRI):  The patient has the following serious cardiovascular risks for perioperative complications:   - No serious cardiac risks = 0 points     RCRI Interpretation: 0 points: Class I (very low risk - 0.4% complication rate)         Signed Electronically by: Chilo Harris MD  Copy of this evaluation " report is provided to requesting physician.

## 2023-10-17 NOTE — PATIENT INSTRUCTIONS
Preparing for Your Surgery  Getting started  A nurse will call you to review your health history and instructions. They will give you an arrival time based on your scheduled surgery time. Please be ready to share:  Your doctor's clinic name and phone number  Your medical, surgical, and anesthesia history  A list of allergies and sensitivities  A list of medicines, including herbal treatments and over-the-counter drugs  Whether the patient has a legal guardian (ask how to send us the papers in advance)  Please tell us if you're pregnant--or if there's any chance you might be pregnant. Some surgeries may injure a fetus (unborn baby), so they require a pregnancy test. Surgeries that are safe for a fetus don't always need a test, and you can choose whether to have one.   If you have a child who's having surgery, please ask for a copy of Preparing for Your Child's Surgery.    Preparing for surgery  Within 10 to 30 days of surgery: Have a pre-op exam (sometimes called an H&P, or History and Physical). This can be done at a clinic or pre-operative center.  If you're having a , you may not need this exam. Talk to your care team.  At your pre-op exam, talk to your care team about all medicines you take. If you need to stop any medicines before surgery, ask when to start taking them again.  We do this for your safety. Many medicines can make you bleed too much during surgery. Some change how well surgery (anesthesia) drugs work.  Call your insurance company to let them know you're having surgery. (If you don't have insurance, call [phone].)  Call your clinic if there's any change in your health. This includes signs of a cold or flu (sore throat, runny nose, cough, rash, fever). It also includes a scrape or scratch near the surgery site.  If you have questions on the day of surgery, call your hospital or surgery center.  Eating and drinking guidelines  For your safety: Unless your surgeon tells you otherwise,  follow the guidelines below.  Eat and drink as usual until 8 hours before you arrive for surgery. After that, no food or milk.  Drink clear liquids until 2 hours before you arrive. These are liquids you can see through, like water, Gatorade, and Propel Water. They also include plain black coffee and tea (no cream or milk), candy, and breath mints. You can spit out gum when you arrive.  If you drink alcohol: Stop drinking it the night before surgery.  If your care team tells you to take medicine on the morning of surgery, it's okay to take it with a sip of water.  Preventing infection  Shower or bathe the night before and morning of your surgery. Follow the instructions your clinic gave you. (If no instructions, use regular soap.)  Don't shave or clip hair near your surgery site. We'll remove the hair if needed.  Don't smoke or vape the morning of surgery. You may chew nicotine gum up to 2 hours before surgery. A nicotine patch is okay.  Note: Some surgeries require you to completely quit smoking and nicotine. Check with your surgeon.  Your care team will make every effort to keep you safe from infection. We will:  Clean our hands often with soap and water (or an alcohol-based hand rub).  Clean the skin at your surgery site with a special soap that kills germs.  Give you a special gown to keep you warm. (Cold raises the risk of infection.)  Wear special hair covers, masks, gowns and gloves during surgery.  Give antibiotic medicine, if prescribed. Not all surgeries need antibiotics.  What to bring on the day of surgery  Photo ID and insurance card  Copy of your health care directive, if you have one  Glasses and hearing aids (bring cases)  You can't wear contacts during surgery  Inhaler and eye drops, if you use them (tell us about these when you arrive)  CPAP machine or breathing device, if you use them  A few personal items, if spending the night  If you have . . .  A pacemaker, ICD (cardiac defibrillator) or other  implant: Bring the ID card.  An implanted stimulator: Bring the remote control.  A legal guardian: Bring a copy of the certified (court-stamped) guardianship papers.  Please remove any jewelry, including body piercings. Leave jewelry and other valuables at home.  If you're going home the day of surgery  You must have a responsible adult drive you home. They should stay with you overnight as well.  If you don't have someone to stay with you, and you aren't safe to go home alone, we may keep you overnight. Insurance often won't pay for this.  After surgery  If it's hard to control your pain or you need more pain medicine, please call your surgeon's office.  Questions?   If you have any questions for your care team, list them here: _________________________________________________________________________________________________________________________________________________________________________ ____________________________________ ____________________________________ ____________________________________  For informational purposes only. Not to replace the advice of your health care provider. Copyright   2003, 2019 [employer]. All rights reserved. Clinically reviewed by Glenys Rene MD. [employer] 107879 - REV 12/22.    How to Take Your Medication Before Surgery    Avoid aspirin and over-the-counter NSAIDs including ibuprofen, Motrin, Advil and Aleve during the week prior to surgery    On the morning of surgery, hold your usual medications including losartan/HCTZ but you may resume everything later in the day following surgery

## 2023-10-20 ENCOUNTER — DOCUMENTATION ONLY (OUTPATIENT)
Dept: OTHER | Facility: CLINIC | Age: 78
End: 2023-10-20
Payer: COMMERCIAL

## 2023-10-31 ENCOUNTER — HOSPITAL ENCOUNTER (OUTPATIENT)
Dept: MAMMOGRAPHY | Facility: CLINIC | Age: 78
Discharge: HOME | End: 2023-10-31
Attending: INTERNAL MEDICINE | Admitting: INTERNAL MEDICINE
Payer: COMMERCIAL

## 2023-10-31 DIAGNOSIS — Z12.31 VISIT FOR SCREENING MAMMOGRAM: ICD-10-CM

## 2023-10-31 PROCEDURE — 77067 SCR MAMMO BI INCL CAD: CPT

## 2023-11-13 ENCOUNTER — HOSPITAL ENCOUNTER (OUTPATIENT)
Dept: MAMMOGRAPHY | Facility: CLINIC | Age: 78
Discharge: HOME | End: 2023-11-13
Attending: INTERNAL MEDICINE
Payer: COMMERCIAL

## 2023-11-13 DIAGNOSIS — N64.89 BREAST ASYMMETRY: ICD-10-CM

## 2023-11-13 PROCEDURE — 77061 BREAST TOMOSYNTHESIS UNI: CPT | Mod: LT

## 2023-11-13 PROCEDURE — 76642 ULTRASOUND BREAST LIMITED: CPT | Mod: LT

## 2023-11-13 NOTE — PROGRESS NOTES
Radiologist, Dr Tracey Fam, recommends left breast ultrasound guided needle biopsy.     While patient was at [facility], I scheduled pt for breast biopsy appt on Thursday, 11/16/23, arrival at 0715am for 0730am appt, at [facility], [address] , [address], [place], [place]. [phone]. Appt on Wed, 11/15/23, 0730am became available, so I called pt to offer sooner appt and pt accepted appt on 11/15/23 at 0730am.     I reviewed the procedure and the written pre and post breast biopsy pt handouts with patient and gave patient the written pre and post biopsy patient handouts to take home.     Pt verbalizes understanding of procedure and appt location, date, and time. Calls welcomed.    Michelle Bunch, RN, BSN, [facility]  [facility]

## 2023-11-15 ENCOUNTER — HOSPITAL ENCOUNTER (OUTPATIENT)
Dept: MAMMOGRAPHY | Facility: CLINIC | Age: 78
Discharge: HOME | End: 2023-11-15
Attending: INTERNAL MEDICINE
Payer: COMMERCIAL

## 2023-11-15 DIAGNOSIS — R92.8 OTHER ABNORMAL AND INCONCLUSIVE FINDINGS ON DIAGNOSTIC IMAGING OF BREAST: ICD-10-CM

## 2023-11-15 DIAGNOSIS — N64.89 BREAST ASYMMETRY: ICD-10-CM

## 2023-11-15 PROCEDURE — 19083 BX BREAST 1ST LESION US IMAG: CPT | Mod: LT

## 2023-11-15 PROCEDURE — 88305 TISSUE EXAM BY PATHOLOGIST: CPT | Mod: TC | Performed by: INTERNAL MEDICINE

## 2023-11-15 PROCEDURE — 999N000065 MA POST PROCEDURE LEFT

## 2023-11-15 PROCEDURE — 250N000009 HC RX 250: Performed by: INTERNAL MEDICINE

## 2023-11-15 RX ADMIN — LIDOCAINE HYDROCHLORIDE 10 ML: 10 SOLUTION INTRAVENOUS at 07:47

## 2023-11-15 NOTE — PROGRESS NOTES
Qian arrived at [facility]. I escorted pt to the Breast Center consult room. Pt was identified using full name and . Wristband is on pt wrist. Pt was able to state which procedure and correct side breast biopsy was occurring today. I reviewed the consent form with the pt and pt was given the consent form to review before signing.     I reviewed post breast biopsy care with pt. Pt acknowledged understanding of post biopsy care. Pt was given written post breast biopsy care handout to take home.    I explained results are expected in 3-5 business days and Breast Center RN will call pt at number pt provided today. Pt has active Magic Leapt, therefore, I explained pathology result alert may occur and reach pt before RN can call to discuss results, and it is up to pt to decide to view results or wait for RN call. Pt verbalizes understanding.    Pt had no questions or concerns.     I escorted pt to the changing room and pt changed into gown. Pt placed personal belongings in a locker and pt has the velasco. I escorted pt to US room and pt sat on chair. I offered pt the aroma therapy of Calming Blend oil, and pt declined. Pt accepted warm blanket. I notified [employer], Anoop Kruger, pt was ready and waiting in US room.     Michelle Bunch, RN, BSN, CBCN  [facility]

## 2023-11-16 ENCOUNTER — TRANSFERRED RECORDS (OUTPATIENT)
Dept: HEALTH INFORMATION MANAGEMENT | Facility: CLINIC | Age: 78
End: 2023-11-16
Payer: COMMERCIAL

## 2023-11-17 ENCOUNTER — TELEPHONE (OUTPATIENT)
Dept: MAMMOGRAPHY | Facility: CLINIC | Age: 78
End: 2023-11-17
Payer: COMMERCIAL

## 2023-11-17 LAB
PATH REPORT.COMMENTS IMP SPEC: NORMAL
PATH REPORT.FINAL DX SPEC: NORMAL
PATH REPORT.GROSS SPEC: NORMAL
PATH REPORT.MICROSCOPIC SPEC OTHER STN: NORMAL
PATH REPORT.RELEVANT HX SPEC: NORMAL
PHOTO IMAGE: NORMAL

## 2023-11-17 PROCEDURE — 88305 TISSUE EXAM BY PATHOLOGIST: CPT | Mod: 26 | Performed by: PATHOLOGY

## 2023-11-17 PROCEDURE — 88342 IMHCHEM/IMCYTCHM 1ST ANTB: CPT | Mod: 26 | Performed by: PATHOLOGY

## 2023-11-17 NOTE — TELEPHONE ENCOUNTER
Called patient to discuss recent breast biopsy results.  Left message to call me back at number below.    Daniela Ding, RN, BSN, PHN, CBCN  Breast Center Imaging Nurse Coordinator   [facility]  [address]  [place] [address]  [phone]

## 2023-11-20 ENCOUNTER — TELEPHONE (OUTPATIENT)
Dept: MAMMOGRAPHY | Facility: CLINIC | Age: 78
End: 2023-11-20
Payer: COMMERCIAL

## 2023-11-20 NOTE — TELEPHONE ENCOUNTER
Following Radiologist, Dr Lorraine Roberson's  review of 11/15/23, left breast biopsy pathology and imaging, my colleague, Daniela Ding RN, attempted to reach pt with results and left a message to return the call. I telephoned patient today and was able to reach pt to review the benign pathology results and Radiologist's recommendation to return to routine mammogram screening.    Pt states she didn't receive a voicemail with a message, so she was appreciative of my call this morning.    Pt states she is healing well from biopsy and has no complaints.    Pt verbalizes understanding of information provided during this call and acceptance of plan. I welcomed calls if any questions or concerns.     I will route this note to ordering provider, Dr Chilo Harris.    Michelle Bunch RN, BSN, [facility]  [facility]

## 2023-12-07 ENCOUNTER — OFFICE VISIT (OUTPATIENT)
Dept: INTERNAL MEDICINE | Facility: CLINIC | Age: 78
End: 2023-12-07
Payer: COMMERCIAL

## 2023-12-07 VITALS
TEMPERATURE: 97.5 F | HEART RATE: 67 BPM | SYSTOLIC BLOOD PRESSURE: 118 MMHG | DIASTOLIC BLOOD PRESSURE: 70 MMHG | OXYGEN SATURATION: 97 % | HEIGHT: 67 IN | RESPIRATION RATE: 16 BRPM | BODY MASS INDEX: 36.88 KG/M2 | WEIGHT: 235 LBS

## 2023-12-07 DIAGNOSIS — E03.9 HYPOTHYROIDISM, UNSPECIFIED TYPE: ICD-10-CM

## 2023-12-07 DIAGNOSIS — M54.16 LUMBAR RADICULOPATHY: ICD-10-CM

## 2023-12-07 DIAGNOSIS — I10 ESSENTIAL HYPERTENSION: ICD-10-CM

## 2023-12-07 DIAGNOSIS — Z01.818 PREOP GENERAL PHYSICAL EXAM: Primary | ICD-10-CM

## 2023-12-07 DIAGNOSIS — H25.9 SENILE CATARACT OF LEFT EYE, UNSPECIFIED AGE-RELATED CATARACT TYPE: ICD-10-CM

## 2023-12-07 DIAGNOSIS — L98.9 SKIN LESION: ICD-10-CM

## 2023-12-07 LAB
ANION GAP SERPL CALCULATED.3IONS-SCNC: 14 MMOL/L (ref 7–15)
BUN SERPL-MCNC: 16.1 MG/DL (ref 8–23)
CALCIUM SERPL-MCNC: 10 MG/DL (ref 8.8–10.2)
CHLORIDE SERPL-SCNC: 102 MMOL/L (ref 98–107)
CREAT SERPL-MCNC: 0.86 MG/DL (ref 0.51–0.95)
DEPRECATED HCO3 PLAS-SCNC: 24 MMOL/L (ref 22–29)
EGFRCR SERPLBLD CKD-EPI 2021: 69 ML/MIN/1.73M2
GLUCOSE SERPL-MCNC: 102 MG/DL (ref 70–99)
POTASSIUM SERPL-SCNC: 4.1 MMOL/L (ref 3.4–5.3)
SODIUM SERPL-SCNC: 140 MMOL/L (ref 135–145)

## 2023-12-07 PROCEDURE — 99214 OFFICE O/P EST MOD 30 MIN: CPT | Performed by: INTERNAL MEDICINE

## 2023-12-07 PROCEDURE — 36415 COLL VENOUS BLD VENIPUNCTURE: CPT | Performed by: INTERNAL MEDICINE

## 2023-12-07 PROCEDURE — 80048 BASIC METABOLIC PNL TOTAL CA: CPT | Performed by: INTERNAL MEDICINE

## 2023-12-07 RX ORDER — GABAPENTIN 300 MG/1
600 CAPSULE ORAL AT BEDTIME
Qty: 180 CAPSULE | Refills: 3 | Status: SHIPPED | OUTPATIENT
Start: 2023-12-07

## 2023-12-07 NOTE — PATIENT INSTRUCTIONS
Make sure you contact your dermatology office and schedule appointment to have the lesion on the right side of your nose evaluated to make sure this is not a basal cell skin cancer     Preparing for Your Surgery  Getting started  A nurse will call you to review your health history and instructions. They will give you an arrival time based on your scheduled surgery time. Please be ready to share:  Your doctor's clinic name and phone number  Your medical, surgical, and anesthesia history  A list of allergies and sensitivities  A list of medicines, including herbal treatments and over-the-counter drugs  Whether the patient has a legal guardian (ask how to send us the papers in advance)  Please tell us if you're pregnant--or if there's any chance you might be pregnant. Some surgeries may injure a fetus (unborn baby), so they require a pregnancy test. Surgeries that are safe for a fetus don't always need a test, and you can choose whether to have one.   If you have a child who's having surgery, please ask for a copy of Preparing for Your Child's Surgery.    Preparing for surgery  Within 10 to 30 days of surgery: Have a pre-op exam (sometimes called an H&P, or History and Physical). This can be done at a clinic or pre-operative center.  If you're having a , you may not need this exam. Talk to your care team.  At your pre-op exam, talk to your care team about all medicines you take. If you need to stop any medicines before surgery, ask when to start taking them again.  We do this for your safety. Many medicines can make you bleed too much during surgery. Some change how well surgery (anesthesia) drugs work.  Call your insurance company to let them know you're having surgery. (If you don't have insurance, call [phone].)  Call your clinic if there's any change in your health. This includes signs of a cold or flu (sore throat, runny nose, cough, rash, fever). It also includes a scrape or scratch near the surgery  site.  If you have questions on the day of surgery, call your hospital or surgery center.  Eating and drinking guidelines  For your safety: Unless your surgeon tells you otherwise, follow the guidelines below.  Eat and drink as usual until 8 hours before you arrive for surgery. After that, no food or milk.  Drink clear liquids until 2 hours before you arrive. These are liquids you can see through, like water, Gatorade, and Propel Water. They also include plain black coffee and tea (no cream or milk), candy, and breath mints. You can spit out gum when you arrive.  If you drink alcohol: Stop drinking it the night before surgery.  If your care team tells you to take medicine on the morning of surgery, it's okay to take it with a sip of water.  Preventing infection  Shower or bathe the night before and morning of your surgery. Follow the instructions your clinic gave you. (If no instructions, use regular soap.)  Don't shave or clip hair near your surgery site. We'll remove the hair if needed.  Don't smoke or vape the morning of surgery. You may chew nicotine gum up to 2 hours before surgery. A nicotine patch is okay.  Note: Some surgeries require you to completely quit smoking and nicotine. Check with your surgeon.  Your care team will make every effort to keep you safe from infection. We will:  Clean our hands often with soap and water (or an alcohol-based hand rub).  Clean the skin at your surgery site with a special soap that kills germs.  Give you a special gown to keep you warm. (Cold raises the risk of infection.)  Wear special hair covers, masks, gowns and gloves during surgery.  Give antibiotic medicine, if prescribed. Not all surgeries need antibiotics.  What to bring on the day of surgery  Photo ID and insurance card  Copy of your health care directive, if you have one  Glasses and hearing aids (bring cases)  You can't wear contacts during surgery  Inhaler and eye drops, if you use them (tell us about these  when you arrive)  CPAP machine or breathing device, if you use them  A few personal items, if spending the night  If you have . . .  A pacemaker, ICD (cardiac defibrillator) or other implant: Bring the ID card.  An implanted stimulator: Bring the remote control.  A legal guardian: Bring a copy of the certified (court-stamped) guardianship papers.  Please remove any jewelry, including body piercings. Leave jewelry and other valuables at home.  If you're going home the day of surgery  You must have a responsible adult drive you home. They should stay with you overnight as well.  If you don't have someone to stay with you, and you aren't safe to go home alone, we may keep you overnight. Insurance often won't pay for this.  After surgery  If it's hard to control your pain or you need more pain medicine, please call your surgeon's office.  Questions?   If you have any questions for your care team, list them here: _________________________________________________________________________________________________________________________________________________________________________ ____________________________________ ____________________________________ ____________________________________  For informational purposes only. Not to replace the advice of your health care provider. Copyright   2003, 2019 [facility]. All rights reserved. Clinically reviewed by Glenys Rene MD. [employer] 145107 - REV 12/22.    How to Take Your Medication Before Surgery     On the morning of your surgery, make sure you take your usual dose of losartan/HCTZ with a small sip of water.  You can also take your usual dose of levothyroxine with a small sip of water but hold all of your other medications until later in the day

## 2023-12-07 NOTE — PROGRESS NOTES
[facility]  [address] [address]  Phone: [phone]  Fax: [phone]  Primary Provider: Chilo Harris  Pre-op Performing Provider: CHILO HARRIS      PREOPERATIVE EVALUATION:  Today's date: 12/7/2023    Qian is a 78 year old, presenting for the following:  Pre-Op Exam        12/7/2023     3:04 PM   Additional Questions   Roomed by Ivana JAY       Surgical Information:  Surgery/Procedure: PHACO IOL-Left Eye only  Surgery Location: [facility] [place]  Surgeon: Dr. Burgess  Surgery Date: 12/20/23  Time of Surgery:   Where patient plans to recover: At home with family  Fax number for surgical facility: [phone]    Assessment & Plan     The proposed surgical procedure is considered LOW risk.    Preop general physical exam  78-year-old woman here for preop clearance prior to upcoming cataract surgery.  She has tolerated previous surgery and anesthesia without any complications.  Her risk is low and she is cleared to proceed as planned.    Senile cataract of left eye, unspecified age-related cataract type  Worsening vision left eye with plans for cataract surgery    Essential hypertension  Blood pressure very well-controlled.  She will take her usual dose of losartan/HCTZ on the morning of surgery with a small sip of water  - Basic metabolic panel  (Ca, Cl, CO2, Creat, Gluc, K, Na, BUN); Future    Lumbar radiculopathy  300 mg of gabapentin at bedtime is not helping with her radicular symptoms.  We discussed increasing to 600 mg.  Radiofrequency ablation has been discussed  - gabapentin (NEURONTIN) 300 MG capsule; Take 2 capsules (600 mg) by mouth at bedtime    Hypothyroidism, unspecified type  She will take her usual dose of levothyroxine on the morning of surgery with a small sip of water    Skin lesion  Lesion on the right side of her nose may represent basal cell skin cancer and I am asking her to schedule appointment with her dermatologist  ASAP               Antiplatelet or Anticoagulation Medication Instructions:   - Bleeding risk is low for this procedure (e.g. dental, skin, cataract).    Additional Medication Instructions:  On the morning of your surgery, make sure you take your usual dose of losartan/HCTZ with a small sip of water.  You can also take your usual dose of levothyroxine with a small sip of water but hold all of your other medications until later in the day    RECOMMENDATION:  APPROVAL GIVEN to proceed with proposed procedure, without further diagnostic evaluation.            Subjective       HPI related to upcoming procedure: 78-year-old woman here for preop prior to cataract surgery.  See assessment and plan for details        11/30/2023    12:35 PM   Preop Questions   1. Have you ever had a heart attack or stroke? No   2. Have you ever had surgery on your heart or blood vessels, such as a stent placement, a coronary artery bypass, or surgery on an artery in your head, neck, heart, or legs? No   3. Do you have chest pain with activity? No   4. Do you have a history of  heart failure? No   5. Do you currently have a cold, bronchitis or symptoms of other infection? No   6. Do you have a cough, shortness of breath, or wheezing? No   7. Do you or anyone in your family have previous history of blood clots? No   8. Do you or does anyone in your family have a serious bleeding problem such as prolonged bleeding following surgeries or cuts? No   9. Have you ever had problems with anemia or been told to take iron pills? No   10. Have you had any abnormal blood loss such as black, tarry or bloody stools, or abnormal vaginal bleeding? No   11. Have you ever had a blood transfusion? No   12. Are you willing to have a blood transfusion if it is medically needed before, during, or after your surgery? Yes   13. Have you or any of your relatives ever had problems with anesthesia? No   14. Do you have sleep apnea, excessive snoring or daytime  drowsiness? No   15. Do you have any artifical heart valves or other implanted medical devices like a pacemaker, defibrillator, or continuous glucose monitor? No   16. Do you have artificial joints? YES -bilateral knee replacement   17. Are you allergic to latex? No       Health Care Directive:  Patient does not have a Health Care Directive or Living Will:       Review of Systems  12 point review of systems is negative other than what is discussed in the assessment and plan    Patient Active Problem List    Diagnosis Date Noted    Age-related cataract of left eye 12/07/2023     Priority: Medium    Lumbar radiculopathy 12/07/2023     Priority: Medium    Vitamin D deficiency 09/01/2023     Priority: Medium    Overactive bladder 08/31/2023     Priority: Medium    Sacroiliac joint pain 08/31/2023     Priority: Medium    Personal history of colonic polyps 01/24/2023     Priority: Medium     Colonoscopy November 2019 with multiple polyps.  Colonoscopy December 2022 again with multiple polyps, repeat in 3 years      Class 2 severe obesity due to excess calories with serious comorbidity in adult (H) 08/30/2022     Priority: Medium    Essential hypertension 08/30/2022     Priority: Medium    Chronic right shoulder pain 08/30/2021     Priority: Medium     Chronic rotator cuff tear      History of bilateral knee replacement 08/30/2021     Priority: Medium    SNHL (sensorineural hearing loss) 12/06/2016     Priority: Medium    Tinnitus of both ears 12/06/2016     Priority: Medium    Hyperlipidemia 07/19/2016     Priority: Medium    Osteoarthritis of multiple joints 07/19/2016     Priority: Medium     Bilateral TKA      Hypothyroidism 07/19/2016     Priority: Medium      Past Medical History:   Diagnosis Date    Age-related cataract of left eye 12/07/2023    Chronic right shoulder pain 08/30/2021    Chronic rotator cuff tear    Class 2 severe obesity due to excess calories with serious comorbidity in adult (H) 08/30/2022     Essential hypertension 08/30/2022    History of bilateral knee replacement 08/30/2021    Hyperlipidemia 07/19/2016    Hypothyroidism 07/19/2016    Lumbar radiculopathy     Obesity (BMI 35.0-39.9 without comorbidity) 07/19/2016    Osteoarthritis of multiple joints 07/19/2016    B TKA    Overactive bladder 08/31/2023    Personal history of colonic polyps     Colonoscopy November 2019 with multiple polyps.  Colonoscopy December 2022 again with multiple polyps, repeat in 3 years    Sacroiliac joint pain 08/31/2023    Screening for osteoporosis     normal DXA 2018.  DEXA October 2023 with some decline but bone density remains normal    SNHL (sensorineural hearing loss) 12/06/2016    Tinnitus of both ears 12/06/2016    Vitamin D deficiency 09/01/2023     Past Surgical History:   Procedure Laterality Date    CATARACT EXTRACTION Bilateral     HYSTERECTOMY Bilateral 01/01/1995    OOPHORECTOMY      TOE SURGERY      hammertoes    TOTAL KNEE ARTHROPLASTY Bilateral 01/01/2008     Current Outpatient Medications   Medication Sig Dispense Refill    acetaminophen (TYLENOL) 500 MG tablet Acetaminophen Extra Strength 500 mg tablet   500mg 2/day      clindamycin (CLEOCIN) 150 MG capsule dental      fish oil-omega-3 fatty acids 1000 MG capsule Take 1 capsule by mouth daily      gabapentin (NEURONTIN) 300 MG capsule Take 2 capsules (600 mg) by mouth at bedtime 180 capsule 3    levothyroxine (SYNTHROID/LEVOTHROID) 125 MCG tablet TAKE 1 TABLET DAILY (OVERDUE FOR TSH) (Patient taking differently: TAKE 1 TABLET DAILY) 90 tablet 2    losartan-hydrochlorothiazide (HYZAAR) 100-12.5 MG tablet Take 1 tablet by mouth daily 90 tablet 3    oxybutynin ER (DITROPAN XL) 5 MG 24 hr tablet Take 1 tablet (5 mg) by mouth daily 30 tablet 0    pravastatin (PRAVACHOL) 20 MG tablet TAKE 1 TABLET EVERY EVENING 90 tablet 3    vitamin D3 (CHOLECALCIFEROL) 50 mcg (2000 units) tablet Take 1 tablet (50 mcg) by mouth daily         Allergies   Allergen Reactions     "Penicillin [Penicillin G] Hives     As a child    Nitrofurantoin Nausea    Statins-Hmg-Coa Reductase Inhibitors [Statins] Muscle Pain (Myalgia)        Social History     Tobacco Use    Smoking status: Never     Passive exposure: Never    Smokeless tobacco: Never   Substance Use Topics    Alcohol use: Yes     Comment: infrequent     Family History   Problem Relation Age of Onset    Peripheral Vascular Disease Mother         d 93yo    Coronary Artery Disease Mother     Coronary Artery Disease Father         d 84yo    Rectal Cancer Father     ALS Sister     Hypertension Sister     Melanoma Brother      History   Drug Use Not on file         Objective     /70 (BP Location: Left arm, Patient Position: Sitting, Cuff Size: Adult Large)   Pulse 67   Temp 97.5  F (36.4  C) (Oral)   Resp 16   Ht 1.702 m (5' 7\")   Wt 106.6 kg (235 lb)   LMP  (LMP Unknown)   SpO2 97%   Breastfeeding No   BMI 36.81 kg/m      Physical Exam  GENERAL APPEARANCE: healthy, alert and no distress  HENT: Normal  RESP: lungs clear to auscultation - no rales, rhonchi or wheezes  CV: regular rate and rhythm, normal S1 S2, no S3 or S4 and no murmur, click or rub.  No carotid bruits  ABDOMEN: soft, nontender, no HSM or masses and bowel sounds normal  NEURO: Normal strength and tone, sensory exam grossly normal, mentation intact and speech normal        Diagnostics:  Labs-  Potassium 4.1 creatinine 0.86     No EKG required for low risk surgery (cataract, skin procedure, breast biopsy, etc).    Revised Cardiac Risk Index (RCRI):  The patient has the following serious cardiovascular risks for perioperative complications:   - No serious cardiac risks = 0 points     RCRI Interpretation: 0 points: Class I (very low risk - 0.4% complication rate)         Signed Electronically by: Chilo Harris MD  Copy of this evaluation report is provided to requesting physician.      "

## 2024-03-05 DIAGNOSIS — N32.81 OVERACTIVE BLADDER: ICD-10-CM

## 2024-03-07 RX ORDER — OXYBUTYNIN CHLORIDE 5 MG/1
5 TABLET, EXTENDED RELEASE ORAL DAILY
Qty: 90 TABLET | Refills: 3 | Status: SHIPPED | OUTPATIENT
Start: 2024-03-07 | End: 2024-09-09

## 2024-05-15 ENCOUNTER — TRANSFERRED RECORDS (OUTPATIENT)
Dept: HEALTH INFORMATION MANAGEMENT | Facility: CLINIC | Age: 79
End: 2024-05-15
Payer: COMMERCIAL

## 2024-06-03 DIAGNOSIS — I10 ESSENTIAL HYPERTENSION: ICD-10-CM

## 2024-06-03 DIAGNOSIS — E03.4 HYPOTHYROIDISM DUE TO ACQUIRED ATROPHY OF THYROID: ICD-10-CM

## 2024-06-04 RX ORDER — LOSARTAN POTASSIUM AND HYDROCHLOROTHIAZIDE 12.5; 1 MG/1; MG/1
TABLET ORAL
Qty: 90 TABLET | Refills: 1 | Status: SHIPPED | OUTPATIENT
Start: 2024-06-04 | End: 2024-09-09

## 2024-06-04 RX ORDER — LEVOTHYROXINE SODIUM 125 UG/1
TABLET ORAL
Qty: 90 TABLET | Refills: 1 | Status: SHIPPED | OUTPATIENT
Start: 2024-06-04 | End: 2024-09-09

## 2024-08-26 DIAGNOSIS — E78.2 MIXED HYPERLIPIDEMIA: ICD-10-CM

## 2024-08-26 RX ORDER — PRAVASTATIN SODIUM 20 MG
TABLET ORAL
Qty: 90 TABLET | Refills: 0 | Status: SHIPPED | OUTPATIENT
Start: 2024-08-26 | End: 2024-09-09

## 2024-09-09 ENCOUNTER — OFFICE VISIT (OUTPATIENT)
Dept: INTERNAL MEDICINE | Facility: CLINIC | Age: 79
End: 2024-09-09
Payer: COMMERCIAL

## 2024-09-09 VITALS
HEIGHT: 67 IN | SYSTOLIC BLOOD PRESSURE: 130 MMHG | RESPIRATION RATE: 16 BRPM | HEART RATE: 55 BPM | DIASTOLIC BLOOD PRESSURE: 70 MMHG | WEIGHT: 231 LBS | OXYGEN SATURATION: 99 % | BODY MASS INDEX: 36.26 KG/M2

## 2024-09-09 DIAGNOSIS — E55.9 VITAMIN D DEFICIENCY: ICD-10-CM

## 2024-09-09 DIAGNOSIS — C44.311 BASAL CELL CARCINOMA (BCC) OF SKIN OF NOSE: ICD-10-CM

## 2024-09-09 DIAGNOSIS — Z86.0100 PERSONAL HISTORY OF COLONIC POLYPS: ICD-10-CM

## 2024-09-09 DIAGNOSIS — E03.4 HYPOTHYROIDISM DUE TO ACQUIRED ATROPHY OF THYROID: ICD-10-CM

## 2024-09-09 DIAGNOSIS — Z00.00 ENCOUNTER FOR MEDICARE ANNUAL WELLNESS EXAM: Primary | ICD-10-CM

## 2024-09-09 DIAGNOSIS — N32.81 OVERACTIVE BLADDER: ICD-10-CM

## 2024-09-09 DIAGNOSIS — L70.9 ACNE, UNSPECIFIED ACNE TYPE: ICD-10-CM

## 2024-09-09 DIAGNOSIS — E66.812 CLASS 2 SEVERE OBESITY DUE TO EXCESS CALORIES WITH SERIOUS COMORBIDITY AND BODY MASS INDEX (BMI) OF 36.0 TO 36.9 IN ADULT (H): ICD-10-CM

## 2024-09-09 DIAGNOSIS — Z51.81 ENCOUNTER FOR THERAPEUTIC DRUG MONITORING: ICD-10-CM

## 2024-09-09 DIAGNOSIS — M54.16 LUMBAR RADICULOPATHY: ICD-10-CM

## 2024-09-09 DIAGNOSIS — I10 ESSENTIAL HYPERTENSION: ICD-10-CM

## 2024-09-09 DIAGNOSIS — H90.3 SENSORINEURAL HEARING LOSS (SNHL) OF BOTH EARS: ICD-10-CM

## 2024-09-09 DIAGNOSIS — E66.01 CLASS 2 SEVERE OBESITY DUE TO EXCESS CALORIES WITH SERIOUS COMORBIDITY AND BODY MASS INDEX (BMI) OF 36.0 TO 36.9 IN ADULT (H): ICD-10-CM

## 2024-09-09 DIAGNOSIS — M15.0 PRIMARY OSTEOARTHRITIS INVOLVING MULTIPLE JOINTS: ICD-10-CM

## 2024-09-09 DIAGNOSIS — Z96.653 HISTORY OF BILATERAL KNEE REPLACEMENT: ICD-10-CM

## 2024-09-09 DIAGNOSIS — E78.2 MIXED HYPERLIPIDEMIA: ICD-10-CM

## 2024-09-09 PROBLEM — H25.9 AGE-RELATED CATARACT OF LEFT EYE: Status: RESOLVED | Noted: 2023-12-07 | Resolved: 2024-09-09

## 2024-09-09 LAB
ALBUMIN SERPL BCG-MCNC: 4.5 G/DL (ref 3.5–5.2)
ALBUMIN UR-MCNC: NEGATIVE MG/DL
ALP SERPL-CCNC: 60 U/L (ref 40–150)
ALT SERPL W P-5'-P-CCNC: 14 U/L (ref 0–50)
ANION GAP SERPL CALCULATED.3IONS-SCNC: 9 MMOL/L (ref 7–15)
APPEARANCE UR: CLEAR
AST SERPL W P-5'-P-CCNC: 24 U/L (ref 0–45)
BILIRUB SERPL-MCNC: 0.6 MG/DL
BILIRUB UR QL STRIP: NEGATIVE
BUN SERPL-MCNC: 19.5 MG/DL (ref 8–23)
CALCIUM SERPL-MCNC: 9.9 MG/DL (ref 8.8–10.4)
CHLORIDE SERPL-SCNC: 100 MMOL/L (ref 98–107)
CHOLEST SERPL-MCNC: 218 MG/DL
COLOR UR AUTO: YELLOW
CREAT SERPL-MCNC: 1.16 MG/DL (ref 0.51–0.95)
EGFRCR SERPLBLD CKD-EPI 2021: 48 ML/MIN/1.73M2
ERYTHROCYTE [DISTWIDTH] IN BLOOD BY AUTOMATED COUNT: 12.4 % (ref 10–15)
FASTING STATUS PATIENT QL REPORTED: YES
FASTING STATUS PATIENT QL REPORTED: YES
GLUCOSE SERPL-MCNC: 95 MG/DL (ref 70–99)
GLUCOSE UR STRIP-MCNC: NEGATIVE MG/DL
HCO3 SERPL-SCNC: 29 MMOL/L (ref 22–29)
HCT VFR BLD AUTO: 40.3 % (ref 35–47)
HDLC SERPL-MCNC: 63 MG/DL
HGB BLD-MCNC: 13.5 G/DL (ref 11.7–15.7)
HGB UR QL STRIP: NEGATIVE
KETONES UR STRIP-MCNC: NEGATIVE MG/DL
LDLC SERPL CALC-MCNC: 122 MG/DL
LEUKOCYTE ESTERASE UR QL STRIP: NEGATIVE
MAGNESIUM SERPL-MCNC: 2.1 MG/DL (ref 1.7–2.3)
MCH RBC QN AUTO: 30.7 PG (ref 26.5–33)
MCHC RBC AUTO-ENTMCNC: 33.5 G/DL (ref 31.5–36.5)
MCV RBC AUTO: 92 FL (ref 78–100)
NITRATE UR QL: NEGATIVE
NONHDLC SERPL-MCNC: 155 MG/DL
PH UR STRIP: 6.5 [PH] (ref 5–8)
PLATELET # BLD AUTO: 185 10E3/UL (ref 150–450)
POTASSIUM SERPL-SCNC: 4.8 MMOL/L (ref 3.4–5.3)
PROT SERPL-MCNC: 7.4 G/DL (ref 6.4–8.3)
RBC # BLD AUTO: 4.4 10E6/UL (ref 3.8–5.2)
SODIUM SERPL-SCNC: 138 MMOL/L (ref 135–145)
SP GR UR STRIP: 1.01 (ref 1–1.03)
TRIGL SERPL-MCNC: 166 MG/DL
TSH SERPL DL<=0.005 MIU/L-ACNC: 1.35 UIU/ML (ref 0.3–4.2)
UROBILINOGEN UR STRIP-ACNC: 0.2 E.U./DL
VIT D+METAB SERPL-MCNC: 38 NG/ML (ref 20–50)
WBC # BLD AUTO: 6.5 10E3/UL (ref 4–11)

## 2024-09-09 PROCEDURE — 80053 COMPREHEN METABOLIC PANEL: CPT | Performed by: INTERNAL MEDICINE

## 2024-09-09 PROCEDURE — 81003 URINALYSIS AUTO W/O SCOPE: CPT | Performed by: INTERNAL MEDICINE

## 2024-09-09 PROCEDURE — 85027 COMPLETE CBC AUTOMATED: CPT | Performed by: INTERNAL MEDICINE

## 2024-09-09 PROCEDURE — 36415 COLL VENOUS BLD VENIPUNCTURE: CPT | Performed by: INTERNAL MEDICINE

## 2024-09-09 PROCEDURE — 83735 ASSAY OF MAGNESIUM: CPT | Performed by: INTERNAL MEDICINE

## 2024-09-09 PROCEDURE — G0439 PPPS, SUBSEQ VISIT: HCPCS | Performed by: INTERNAL MEDICINE

## 2024-09-09 PROCEDURE — 84443 ASSAY THYROID STIM HORMONE: CPT | Performed by: INTERNAL MEDICINE

## 2024-09-09 PROCEDURE — 99214 OFFICE O/P EST MOD 30 MIN: CPT | Mod: 25 | Performed by: INTERNAL MEDICINE

## 2024-09-09 PROCEDURE — 80061 LIPID PANEL: CPT | Performed by: INTERNAL MEDICINE

## 2024-09-09 PROCEDURE — 82306 VITAMIN D 25 HYDROXY: CPT | Performed by: INTERNAL MEDICINE

## 2024-09-09 RX ORDER — LOSARTAN POTASSIUM AND HYDROCHLOROTHIAZIDE 12.5; 1 MG/1; MG/1
1 TABLET ORAL DAILY
Qty: 90 TABLET | Refills: 3 | Status: SHIPPED | OUTPATIENT
Start: 2024-09-09

## 2024-09-09 RX ORDER — LEVOTHYROXINE SODIUM 125 UG/1
TABLET ORAL
Qty: 90 TABLET | Refills: 3 | Status: SHIPPED | OUTPATIENT
Start: 2024-09-09

## 2024-09-09 RX ORDER — OXYBUTYNIN CHLORIDE 5 MG/1
5 TABLET, EXTENDED RELEASE ORAL DAILY
Qty: 90 TABLET | Refills: 3 | Status: SHIPPED | OUTPATIENT
Start: 2024-09-09

## 2024-09-09 RX ORDER — PRAVASTATIN SODIUM 20 MG
TABLET ORAL
Qty: 90 TABLET | Refills: 3 | Status: SHIPPED | OUTPATIENT
Start: 2024-09-09

## 2024-09-09 RX ORDER — CLINDAMYCIN PHOSPHATE 10 UG/ML
LOTION TOPICAL 2 TIMES DAILY
COMMUNITY
Start: 2024-05-15

## 2024-09-09 NOTE — PATIENT INSTRUCTIONS
Patient Education   Preventive Care Advice   This is general advice given by our system to help you stay healthy. However, your care team may have specific advice just for you. Please talk to your care team about your preventive care needs.  Nutrition  Eat 5 or more servings of fruits and vegetables each day.  Try wheat bread, brown rice and whole grain pasta (instead of white bread, rice, and pasta).  Get enough calcium and vitamin D. Check the label on foods and aim for 100% of the RDA (recommended daily allowance).  Lifestyle  Exercise at least 150 minutes each week  (30 minutes a day, 5 days a week).  Do muscle strengthening activities 2 days a week. These help control your weight and prevent disease.  No smoking.  Wear sunscreen to prevent skin cancer.  Continue to see your dermatologist every year  Have a dental exam and cleaning every 6 months.  Annual eye exam  Yearly exams  See your health care team every year to talk about:  Any changes in your health.  Any medicines your care team has prescribed.  Preventive care, family planning, and ways to prevent chronic diseases.  Shots (vaccines)   COVID-19 shot: Get this shot this month.  You can schedule at your pharmacy  Flu shot: Get a flu shot every year.  Tetanus shot: Get a tetanus shot every 10 years.  Pneumococcal and RSV shots: Completed  Shingles shot (for age 50 and up).  Completed  General health tests  Diabetes screening: Annually  Cholesterol test: Annually  Bone density scan (DEXA): Normal DEXA in 2023  Hepatitis C: Get tested at least once in your life.  STIs (sexually transmitted infections)  Before age 24: Ask your care team if you should be screened for STIs.  After age 24: Get screened for STIs if you're at risk. You are at risk for STIs (including HIV) if:  You are sexually active with more than one person.  You don't use condoms every time.  You or a partner was diagnosed with a sexually transmitted infection.  If you are at risk for HIV, ask  "about PrEP medicine to prevent HIV.  Get tested for HIV at least once in your life, whether you are at risk for HIV or not.  Cancer screening tests  Breast cancer scan (mammogram): Annual mammogram  Colon cancer screening: It is important to start screening for colon cancer at age 45.  Colonoscopy will be due December 2025  For informational purposes only. Not to replace the advice of your health care provider. Copyright   2023 [facility]. All rights reserved. Clinically reviewed by the [facility] Transitions Program. [employer]BTec 459341 - REV 01/24.  Learning About Being Physically Active  What is physical activity?     Being physically active means doing any kind of activity that gets your body moving.  The types of physical activity that can help you get fit and stay healthy include:  Aerobic or \"cardio\" activities. These make your heart beat faster and make you breathe harder, such as brisk walking, riding a bike, or running. They strengthen your heart and lungs and build up your endurance.  Strength training activities. These make your muscles work against, or \"resist,\" something. Examples include lifting weights or doing push-ups. These activities help tone and strengthen your muscles and bones.  Stretches. These let you move your joints and muscles through their full range of motion. Stretching helps you be more flexible.  Reaching a balance between these three types of physical activity is important because each one contributes to your overall fitness.  What are the benefits of being active?  Being active is one of the best things you can do for your health. It helps you to:  Feel stronger and have more energy to do all the things you like to do.  Focus better at school or work.  Feel, think, and sleep better.  Reach and stay at a healthy weight.  Lose fat and build lean muscle.  Lower your risk for serious health problems, including diabetes, heart attack, high blood pressure, and some " "cancers.  Keep your heart, lungs, bones, muscles, and joints strong and healthy.  How can you make being active part of your life?  Start slowly. Make it your long-term goal to get at least 30 minutes of exercise on most days of the week. Walking is a good choice. You also may want to do other activities, such as running, swimming, cycling, or playing tennis or team sports.  Pick activities that you like--ones that make your heart beat faster, your muscles stronger, and your muscles and joints more flexible. If you find more than one thing you like doing, do them all. You don't have to do the same thing every day.  Get your heart pumping every day. Any activity that makes your heart beat faster and keeps it at that rate for a while counts.  Here are some great ways to get your heart beating faster:  Go for a brisk walk, run, or hike.  Go for a swim or bike ride.  Take an online exercise class or dance.  Play a game of touch football, basketball, or soccer.  Play tennis, pickleball, or racquetball.  Climb stairs.  Even some household chores can be aerobic. Just do them at a faster pace. Raking or mowing the lawn, sweeping the garage, and vacuuming and cleaning your home all can help get your heart rate up.  Strengthen your muscles during the week. You don't have to lift heavy weights or grow big, bulky muscles to get stronger. Doing a few simple activities that make your muscles work against, or \"resist,\" something can help you get stronger. Aim for at least twice a week.  For example, you can:  Do push-ups or sit-ups, which use your own body weight as resistance.  Lift weights or dumbbells or use stretch bands at home or in a gym or community center.  Stretch your muscles often. Stretching will help you as you become more active. It can help you stay flexible and loosen tight muscles. It can also help improve your balance and posture and can be a great way to relax.  Be sure to stretch the muscles you'll be using " "when you work out. It's best to warm your muscles slightly before you stretch them. Walk or do some other light aerobic activity for a few minutes. Then start stretching.  When you stretch your muscles:  Do it slowly. Stretching is not about going fast or making sudden movements.  Don't push or bounce during a stretch.  Hold each stretch for at least 15 to 30 seconds, if you can. You should feel a stretch in the muscle, but not pain.  Breathe out as you do the stretch. Then breathe in as you hold the stretch. Don't hold your breath.  If you're worried about how more activity might affect your health, have a checkup before you start. Follow any special advice your doctor gives you for getting a smart start.  Where can you learn more?  Go to https://www.[employer].net/patiented  Enter W332 in the search box to learn more about \"Learning About Being Physically Active.\"  Current as of: June 5, 2023  Content Version: 14.1 2006-2024 [employer].   Care instructions adapted under license by your healthcare professional. If you have questions about a medical condition or this instruction, always ask your healthcare professional. [employer] disclaims any warranty or liability for your use of this information.    Eating Healthy Foods: Care Instructions  With every meal, you can make healthy food choices. Try to eat a variety of fruits, vegetables, whole grains, lean proteins, and low-fat dairy products. This can help you get the right balance of nutrients, including vitamins and minerals. Small changes add up over time. You can start by adding one healthy food to your meals each day.    Try to make half your plate fruits and vegetables, one-fourth whole grains, and one-fourth lean proteins. Try including dairy with your meals.   Eat more fruits and vegetables. Try to have them with most meals and snacks.   Foods for healthy eating    Fruits    These can be fresh, frozen, canned, or dried.  Try to " "choose whole fruit rather than fruit juice.  Eat a variety of colors.    Vegetables    These can be fresh, frozen, canned, or dried.  Beans, peas, and lentils count too.    Whole grains    Choose whole-grain breads, cereals, and noodles.  Try brown rice.    Lean proteins    These can include lean meat, poultry, fish, and eggs.  You can also have tofu, beans, peas, lentils, nuts, and seeds.    Dairy    Try milk, yogurt, and cheese.  Choose low-fat or fat-free when you can.  If you need to, use lactose-free milk or fortified plant-based milk products, such as soy milk.    Water    Drink water when you're thirsty.  Limit sugar-sweetened drinks, including soda, fruit drinks, and sports drinks.  Where can you learn more?  Go to https://www.[employer].net/patiented  Enter T756 in the search box to learn more about \"Eating Healthy Foods: Care Instructions.\"  Current as of: September 20, 2023               Content Version: 14.0    2172-6544 [employer].   Care instructions adapted under license by your healthcare professional. If you have questions about a medical condition or this instruction, always ask your healthcare professional. [employer] disclaims any warranty or liability for your use of this information.      Bladder Training: Care Instructions  Your Care Instructions     Bladder training is used to treat urge incontinence and stress incontinence. Urge incontinence means that the need to urinate comes on so fast that you can't get to a toilet in time. Stress incontinence means that you leak urine because of pressure on your bladder. For example, it may happen when you laugh, cough, or lift something heavy.  Bladder training can increase how long you can wait before you have to urinate. It can also help your bladder hold more urine. And it can give you better control over the urge to urinate.  It is important to remember that bladder training takes a few weeks to a few months to make a " difference. You may not see results right away, but don't give up.  Follow-up care is a key part of your treatment and safety. Be sure to make and go to all appointments, and call your doctor if you are having problems. It's also a good idea to know your test results and keep a list of the medicines you take.  How can you care for yourself at home?  Work with your doctor to come up with a bladder training program that is right for you. You may use one or more of the following methods.  Delayed urination  In the beginning, try to keep from urinating for 5 minutes after you first feel the need to go.  While you wait, take deep, slow breaths to relax. Kegel exercises can also help you delay the need to go to the bathroom.  After some practice, when you can easily wait 5 minutes to urinate, try to wait 10 minutes before you urinate.  Slowly increase the waiting period until you are able to control when you have to urinate.  Scheduled urination  Empty your bladder when you first wake up in the morning.  Schedule times throughout the day when you will urinate.  Start by going to the bathroom every hour, even if you don't need to go.  Slowly increase the time between trips to the bathroom.  When you have found a schedule that works well for you, keep doing it.  If you wake up during the night and have to urinate, do it. Apply your schedule to waking hours only.  Kegel exercises  These tighten and strengthen pelvic muscles, which can help you control the flow of urine. (If doing these exercises causes pain, stop doing them and talk with your doctor.) To do Kegel exercises:  Squeeze your muscles as if you were trying not to pass gas. Or squeeze your muscles as if you were stopping the flow of urine. Your belly, legs, and buttocks shouldn't move.  Hold the squeeze for 3 seconds, then relax for 5 to 10 seconds.  Start with 3 seconds, then add 1 second each week until you are able to squeeze for 10 seconds.  Repeat the exercise  "10 times a session. Do 3 to 8 sessions a day.  When should you call for help?  Watch closely for changes in your health, and be sure to contact your doctor if:    Your incontinence is getting worse.     You do not get better as expected.   Where can you learn more?  Go to https://www.[employer].net/patiented  Enter V684 in the search box to learn more about \"Bladder Training: Care Instructions.\"  Current as of: November 15, 2023               Content Version: 14.0    3557-8405 [employer].   Care instructions adapted under license by your healthcare professional. If you have questions about a medical condition or this instruction, always ask your healthcare professional. [employer] disclaims any warranty or liability for your use of this information.         "

## 2024-09-09 NOTE — PROGRESS NOTES
Preventive Care Visit  [facility]  Chilo Harris MD, Internal Medicine  Sep 9, 2024      Assessment & Plan     Encounter for Medicare annual wellness exam  Immunizations are reviewed and recommending getting flu shot and COVID-vaccine this fall.  Everything else is up-to-date.  She has a living well.  Non-smoker.  Uses alcohol in moderation.  Regular exercise and good diet habits to maintain a healthy weight discussed.  Up to date with colonoscopies and this should be repeated in December 2025.  Recommending annual mammogram for breast cancer screening.   Last DEXA was 2023. Dementia and depression screening completed.  Recommending annual eye exam.  Recommending seeing a dentist every 6 months.  Skin exam performed and recommending regular use of sunblock.  She should see a dermatologist every year.  Hepatitis C antibody for screening was normal.  Will screen for diabetes with fasting glucose.    \  Essential hypertension  Blood pressure is very well-controlled taking losartan/HCTZ tolerating medication without side effect.  Monitor renal function and electrolytes and check other appropriate studies.  20 pound weight loss over the past year has also been helpful  - losartan-hydrochlorothiazide (HYZAAR) 100-12.5 MG tablet; Take 1 tablet by mouth daily.  - CBC with platelets; Future  - Comprehensive metabolic panel (BMP + Alb, Alk Phos, ALT, AST, Total. Bili, TP); Future  - UA Macroscopic with reflex to Microscopic and Culture - Lab Collect; Future    Class 2 severe obesity due to excess calories with serious comorbidity and body mass index (BMI) of 36.0 to 36.9 in adult (H)  She has done a great job losing over 20 pounds during the past year.  Encouraging her to continue efforts at exercise and diet    Mixed hyperlipidemia  Recheck lipid profile taking pravastatin.  Tolerating medication without side effect.  - pravastatin (PRAVACHOL) 20 MG tablet; TAKE 1 TABLET EVERY EVENING  - Lipid  "Profile (Chol, Trig, HDL, LDL calc); Future    Lumbar radiculopathy  Ongoing radicular symptoms.  She is not convinced that gabapentin has been helpful although it could be that she needs a higher dose.  At this point she would like to taper off and we discussed decreasing to 300 mg at bedtime for 2 weeks before discontinuing completely.  She should follow-up with [facility].  She has had DANIELLE in the past and this has been helpful.  She can also follow-up with her chiropractor    Primary osteoarthritis involving multiple joints  She has had both knees replaced    History of bilateral knee replacement      Vitamin D deficiency  Rechecking vitamin D level on replacement  - Vitamin D deficiency screening; Future    Hypothyroidism due to acquired atrophy of thyroid  Monitor TSH and adjust dose of levothyroxine if necessary  - levothyroxine (SYNTHROID/LEVOTHROID) 125 MCG tablet; TAKE 1 TABLET DAILY  - TSH with free T4 reflex; Future    Overactive bladder  Continues on Ditropan  - oxyBUTYnin ER (DITROPAN XL) 5 MG 24 hr tablet; Take 1 tablet (5 mg) by mouth daily.    Basal cell carcinoma (BCC) of skin of nose  Lesion on her nose was basal cell skin cancer and she had Mohs surgery.  She will see her dermatologist annually    Acne, unspecified acne type  Using Cleocin gel    Sensorineural hearing loss (SNHL) of both ears  She has hearing aids    Personal history of colonic polyps  Multiple polyps removed on her last colonoscopy in 2022 and she should have a repeat colonoscopy December 2025    Patient has been advised of split billing requirements and indicates understanding: Yes        BMI  Estimated body mass index is 36.73 kg/m  as calculated from the following:    Height as of this encounter: 1.689 m (5' 6.5\").    Weight as of this encounter: 104.8 kg (231 lb).       Counseling  Appropriate preventive services were addressed with this patient via screening, questionnaire, or discussion as appropriate for fall " prevention, nutrition, physical activity, Tobacco-use cessation, social engagement, weight loss and cognition.  Checklist reviewing preventive services available has been given to the patient.  Reviewed patient's diet, addressing concerns and/or questions.   She is at risk for lack of exercise and has been provided with information to increase physical activity for the benefit of her well-being.   She is at risk for psychosocial distress and has been provided with information to reduce risk.   The patient was provided with written information regarding signs of hearing loss.   Information on urinary incontinence and treatment options given to patient.       Follow-up in 1 year for annual wellness visit    Maryse Laughlin is a 79 year old, presenting for the following: Here for annual wellness visit and to follow-up medical problems including hypertension, hypothyroidism, hyperlipidemia, and other chronic medical problems.  See assessment and plan for details  Physical (AWV, fasting)        9/9/2024     1:36 PM   Additional Questions   Roomed by          Health Care Directive  Patient does have a Health Care Directive or Living Will: Patient states has Advance Directive and will bring in a copy to clinic.          9/8/2024   General Health   How would you rate your overall physical health? Good   Feel stress (tense, anxious, or unable to sleep) Only a little      (!) STRESS CONCERN      9/8/2024   Nutrition   Diet: Regular (no restrictions)            9/8/2024   Exercise   Days per week of moderate/strenous exercise 2 days   Average minutes spent exercising at this level 20 min      (!) EXERCISE CONCERN      9/8/2024   Social Factors   Frequency of gathering with friends or relatives Twice a week   Worry food won't last until get money to buy more No   Food not last or not have enough money for food? No   Do you have housing? (Housing is defined as stable permanent housing and does not include [facility]  in a [facility], in a tent, in an abandoned building, in an overnight shelter, or couch-surfing.) Yes   Are you worried about losing your housing? No   Lack of transportation? No   Unable to get utilities (heat,electricity)? No            9/8/2024   Fall Risk   Fallen 2 or more times in the past year? No    No   Trouble with walking or balance? No    No       Multiple values from one day are sorted in reverse-chronological order          9/8/2024   Activities of Daily Living- Home Safety   Needs help with the following daily activites None of the above   Safety concerns in the home None of the above            9/8/2024   Dental   Dentist two times every year? Yes            9/8/2024   Hearing Screening   Hearing concerns? (!) IT'S HARD TO FOLLOW A CONVERSATION IN A NOISY RESTAURANT OR CROWDED ROOM.    (!) TROUBLE UNDERSTANDING SOFT OR WHISPERED SPEECH.       Multiple values from one day are sorted in reverse-chronological order         9/8/2024   Driving Risk Screening   Patient/family members have concerns about driving No            9/8/2024   General Alertness/Fatigue Screening   Have you been more tired than usual lately? No            9/8/2024   Urinary Incontinence Screening   Bothered by leaking urine in past 6 months Yes            9/8/2024   TB Screening   Were you born outside of the [place]? No            Today's PHQ-2 Score:       9/8/2024     3:18 PM   PHQ-2 ( 1999 [employer])   Q1: Little interest or pleasure in doing things 0   Q2: Feeling down, depressed or hopeless 0   PHQ-2 Score 0   Q1: Little interest or pleasure in doing things Not at all   Q2: Feeling down, depressed or hopeless Not at all   PHQ-2 Score 0           9/8/2024   Substance Use   Alcohol more than 3/day or more than 7/wk No   Do you have a current opioid prescription? No   How severe/bad is pain from 1 to 10? 2/10   Do you use any other substances recreationally? (!) OTHER        Social History     Tobacco Use    Smoking status: Never     Passive  exposure: Never    Smokeless tobacco: Never   Vaping Use    Vaping status: Never Used   Substance Use Topics    Alcohol use: Yes     Comment: infrequent           10/31/2023   LAST FHS-7 RESULTS   1st degree relative breast or ovarian cancer No   Any relative bilateral breast cancer No   Any male have breast cancer No   Any ONE woman have BOTH breast AND ovarian cancer No   Any woman with breast cancer before 50yrs No   2 or more relatives with breast AND/OR ovarian cancer No   2 or more relatives with breast AND/OR bowel cancer No               ASCVD Risk   The 10-year ASCVD risk score (Jb DAMON, et al., 2019) is: 29.4%    Values used to calculate the score:      Age: 79 years      Sex: Female      Is Non- : No      Diabetic: No      Tobacco smoker: No      Systolic Blood Pressure: 130 mmHg      Is BP treated: Yes      HDL Cholesterol: 61 mg/dL      Total Cholesterol: 197 mg/dL            Reviewed and updated as needed this visit by Provider                    Past Medical History:   Diagnosis Date    Acne 09/09/2024    Age-related cataract of left eye 12/07/2023    Basal cell carcinoma (BCC) of skin of nose 09/09/2024    Chronic right shoulder pain 08/30/2021    Chronic rotator cuff tear    Class 2 severe obesity due to excess calories with serious comorbidity in adult (H) 08/30/2022    Essential hypertension 08/30/2022    History of bilateral knee replacement 08/30/2021    Hyperlipidemia 07/19/2016    Hypothyroidism 07/19/2016    Lumbar radiculopathy     Obesity (BMI 35.0-39.9 without comorbidity) 07/19/2016    Osteoarthritis of multiple joints 07/19/2016    B TKA    Overactive bladder 08/31/2023    Personal history of colonic polyps     Colonoscopy November 2019 with multiple polyps.  Colonoscopy December 2022 again with multiple polyps, repeat in 3 years    Sacroiliac joint pain 08/31/2023    Screening for osteoporosis     normal DXA 2018.  DEXA October 2023 with some decline  "but bone density remains normal    SNHL (sensorineural hearing loss) 12/06/2016    Tinnitus of both ears 12/06/2016    Vitamin D deficiency 09/01/2023     Past Surgical History:   Procedure Laterality Date    CATARACT EXTRACTION Bilateral     HYSTERECTOMY Bilateral 01/01/1995    OOPHORECTOMY      TOE SURGERY      hammertoes    TOTAL KNEE ARTHROPLASTY Bilateral 01/01/2008     Current providers sharing in care for this patient include:  Patient Care Team:  Chilo Harris MD as PCP - General (Internal Medicine)  Chilo Harris MD as Assigned PCP    The following health maintenance items are reviewed in Epic and correct as of today:  Health Maintenance   Topic Date Due    LIPID  08/31/2024    ANNUAL REVIEW OF HM ORDERS  08/31/2024    INFLUENZA VACCINE (1) 09/01/2024    COVID-19 Vaccine (7 - 2023-24 season) 09/01/2024    MEDICARE ANNUAL WELLNESS VISIT  08/31/2024    TSH W/FREE T4 REFLEX  08/31/2024    FALL RISK ASSESSMENT  09/09/2025    GLUCOSE  12/07/2026    DTAP/TDAP/TD IMMUNIZATION (2 - Td or Tdap) 03/06/2027    ADVANCE CARE PLANNING  10/20/2028    DEXA  10/17/2038    PHQ-2 (once per calendar year)  Completed    Pneumococcal Vaccine: 65+ Years  Completed    ZOSTER IMMUNIZATION  Completed    RSV VACCINE  Completed    HPV IMMUNIZATION  Aged Out    MENINGITIS IMMUNIZATION  Aged Out    RSV MONOCLONAL ANTIBODY  Aged Out    HEPATITIS C SCREENING  Discontinued    MAMMO SCREENING  Discontinued    COLORECTAL CANCER SCREENING  Discontinued         Review of Systems  Constitutional, HEENT, cardiovascular, pulmonary, GI, , musculoskeletal, neuro, skin, endocrine and psych systems are negative, except as otherwise noted.     Objective    Exam  /70 (BP Location: Left arm, Patient Position: Sitting, Cuff Size: Adult Large)   Pulse 55   Resp 16   Ht 1.689 m (5' 6.5\")   Wt 104.8 kg (231 lb)   LMP  (LMP Unknown)   SpO2 99%   Breastfeeding No   BMI 36.73 kg/m     Estimated body mass index is 36.73 kg/m  as " "calculated from the following:    Height as of this encounter: 1.689 m (5' 6.5\").    Weight as of this encounter: 104.8 kg (231 lb).    Physical Exam  EYES: Eyelids, conjunctiva, and sclera were normal. Pupils were normal. Cornea, iris, and lens were normal bilaterally.  HEAD, EARS, NOSE, MOUTH, AND THROAT: Head and face were normal. TMs and external auditory canals are normal.  Oropharynx normal  NECK: Neck appearance was normal. There were no neck masses and the thyroid was not enlarged and no nodules are felt.  No lymphadenopathy.  RESPIRATORY: Breathing pattern was normal and the chest moved symmetrically.   Lung sounds were normal and there were no rales or wheezes.  CARDIOVASCULAR: Heart rate and rhythm were normal.  S1 and S2 were normal and there were no extra sounds or murmurs. Peripheral pulses in arms and legs were normal.  There was no peripheral edema.  No carotid bruits.  GASTROINTESTINAL:  Bowel sounds were present.   Palpation detected no tenderness, mass, or enlarged organs.   MUSCULOSKELETAL: Skeletal configuration was normal and muscle mass was normal for age. Joint appearance was overall normal.  LYMPHATIC: There were no enlarged nodes.  SKIN/HAIR/NAILS: Skin color was normal.  There were no concerning skin lesions.  NEUROLOGIC: The patient was alert and oriented to person, place, time, and circumstance. Speech was normal. Cranial nerves were normal. Motor strength was normal for age. The patient was normally coordinated.  Sensation intact.  PSYCHIATRIC:  Mood and affect were normal and the patient had normal recent and remote memory. The patient's judgment and insight were normal.         9/9/2024   Mini Cog   Clock Draw Score 2 Normal   3 Item Recall 3 objects recalled   Mini Cog Total Score 5                 Signed Electronically by: Chilo Harris MD    "

## 2024-09-10 DIAGNOSIS — Z51.81 ENCOUNTER FOR THERAPEUTIC DRUG MONITORING: Primary | ICD-10-CM

## 2024-09-12 ENCOUNTER — TRANSFERRED RECORDS (OUTPATIENT)
Dept: HEALTH INFORMATION MANAGEMENT | Facility: CLINIC | Age: 79
End: 2024-09-12
Payer: COMMERCIAL

## 2024-10-01 ENCOUNTER — TRANSFERRED RECORDS (OUTPATIENT)
Dept: HEALTH INFORMATION MANAGEMENT | Facility: CLINIC | Age: 79
End: 2024-10-01
Payer: COMMERCIAL

## 2024-10-07 ENCOUNTER — TRANSFERRED RECORDS (OUTPATIENT)
Dept: HEALTH INFORMATION MANAGEMENT | Facility: CLINIC | Age: 79
End: 2024-10-07
Payer: COMMERCIAL

## 2024-11-05 ENCOUNTER — LAB (OUTPATIENT)
Dept: LAB | Facility: CLINIC | Age: 79
End: 2024-11-05
Payer: COMMERCIAL

## 2024-11-05 DIAGNOSIS — Z51.81 ENCOUNTER FOR THERAPEUTIC DRUG MONITORING: ICD-10-CM

## 2024-11-05 DIAGNOSIS — I10 ESSENTIAL HYPERTENSION: Primary | ICD-10-CM

## 2024-11-05 LAB
ANION GAP SERPL CALCULATED.3IONS-SCNC: 12 MMOL/L (ref 7–15)
BUN SERPL-MCNC: 21.6 MG/DL (ref 8–23)
CALCIUM SERPL-MCNC: 10.1 MG/DL (ref 8.8–10.4)
CHLORIDE SERPL-SCNC: 100 MMOL/L (ref 98–107)
CREAT SERPL-MCNC: 1.18 MG/DL (ref 0.51–0.95)
EGFRCR SERPLBLD CKD-EPI 2021: 47 ML/MIN/1.73M2
GLUCOSE SERPL-MCNC: 110 MG/DL (ref 70–99)
HCO3 SERPL-SCNC: 27 MMOL/L (ref 22–29)
POTASSIUM SERPL-SCNC: 4.4 MMOL/L (ref 3.4–5.3)
SODIUM SERPL-SCNC: 139 MMOL/L (ref 135–145)

## 2024-11-05 PROCEDURE — 36415 COLL VENOUS BLD VENIPUNCTURE: CPT

## 2024-11-05 PROCEDURE — 80048 BASIC METABOLIC PNL TOTAL CA: CPT

## 2024-11-05 RX ORDER — LOSARTAN POTASSIUM 100 MG/1
100 TABLET ORAL DAILY
Qty: 90 TABLET | Refills: 3 | Status: SHIPPED | OUTPATIENT
Start: 2024-11-05

## 2024-11-05 RX ORDER — HYDROCHLOROTHIAZIDE 12.5 MG/1
12.5 TABLET ORAL EVERY OTHER DAY
Qty: 45 TABLET | Refills: 3 | Status: SHIPPED | OUTPATIENT
Start: 2024-11-05

## 2024-11-12 DIAGNOSIS — M54.16 LUMBAR RADICULOPATHY: ICD-10-CM

## 2024-11-12 RX ORDER — GABAPENTIN 300 MG/1
CAPSULE ORAL
Qty: 180 CAPSULE | Refills: 3 | Status: SHIPPED | OUTPATIENT
Start: 2024-11-12

## 2024-11-15 ENCOUNTER — HOSPITAL ENCOUNTER (OUTPATIENT)
Dept: MAMMOGRAPHY | Facility: CLINIC | Age: 79
Discharge: HOME | End: 2024-11-15
Admitting: INTERNAL MEDICINE
Payer: COMMERCIAL

## 2024-11-15 DIAGNOSIS — Z12.31 VISIT FOR SCREENING MAMMOGRAM: ICD-10-CM

## 2024-11-15 PROCEDURE — 77063 BREAST TOMOSYNTHESIS BI: CPT

## 2025-01-23 RX ORDER — MELOXICAM 7.5 MG/1
TABLET ORAL
Refills: 0 | OUTPATIENT
Start: 2025-01-23

## 2025-01-23 NOTE — TELEPHONE ENCOUNTER
I have no record of her being prescribed this medication from me.  Not sure if she was getting it from another provider, possibly orthopedics.  She should contact that original prescriber or set up a video visit with me if she needs a prescription

## 2025-01-23 NOTE — TELEPHONE ENCOUNTER
Outgoing call to patient to ask about the Meloxicam refill request. No answer. LVMTCB.         When patient calls back, please see Dr. Harris message about refill request and inquire about it.     Irma KAUR RN

## 2025-01-23 NOTE — TELEPHONE ENCOUNTER
Patient Returning Call    Reason for call:  return call    Information relayed to patient:  yes, relayed message below. Patient was prescribed through [facility], and requested through [facility] originally, then was told by [facility] to request from her provider, and she requested through PCP, rather than [facility]. Patient instructed to reach out to [facility] for a refill at this time.    Patient has additional questions:  No

## 2025-03-06 ENCOUNTER — LAB (OUTPATIENT)
Dept: LAB | Facility: CLINIC | Age: 80
End: 2025-03-06
Payer: COMMERCIAL

## 2025-03-06 DIAGNOSIS — I10 ESSENTIAL HYPERTENSION: ICD-10-CM

## 2025-03-06 LAB
ANION GAP SERPL CALCULATED.3IONS-SCNC: 10 MMOL/L (ref 7–15)
BUN SERPL-MCNC: 19 MG/DL (ref 8–23)
CALCIUM SERPL-MCNC: 10.5 MG/DL (ref 8.8–10.4)
CHLORIDE SERPL-SCNC: 101 MMOL/L (ref 98–107)
CREAT SERPL-MCNC: 1.11 MG/DL (ref 0.51–0.95)
EGFRCR SERPLBLD CKD-EPI 2021: 51 ML/MIN/1.73M2
GLUCOSE SERPL-MCNC: 115 MG/DL (ref 70–99)
HCO3 SERPL-SCNC: 28 MMOL/L (ref 22–29)
POTASSIUM SERPL-SCNC: 4.5 MMOL/L (ref 3.4–5.3)
SODIUM SERPL-SCNC: 139 MMOL/L (ref 135–145)

## 2025-04-24 ENCOUNTER — OFFICE VISIT (OUTPATIENT)
Dept: INTERNAL MEDICINE | Facility: CLINIC | Age: 80
End: 2025-04-24
Payer: COMMERCIAL

## 2025-04-24 VITALS
HEART RATE: 73 BPM | TEMPERATURE: 98 F | SYSTOLIC BLOOD PRESSURE: 128 MMHG | OXYGEN SATURATION: 97 % | DIASTOLIC BLOOD PRESSURE: 72 MMHG | RESPIRATION RATE: 14 BRPM

## 2025-04-24 DIAGNOSIS — R79.89 ELEVATED SERUM CREATININE: ICD-10-CM

## 2025-04-24 DIAGNOSIS — I10 ESSENTIAL HYPERTENSION: Primary | ICD-10-CM

## 2025-04-24 DIAGNOSIS — M54.16 LUMBAR RADICULOPATHY: ICD-10-CM

## 2025-04-24 DIAGNOSIS — M79.672 CHRONIC PAIN OF BOTH FEET: ICD-10-CM

## 2025-04-24 DIAGNOSIS — M79.671 CHRONIC PAIN OF BOTH FEET: ICD-10-CM

## 2025-04-24 DIAGNOSIS — G89.29 CHRONIC PAIN OF BOTH FEET: ICD-10-CM

## 2025-04-24 RX ORDER — GABAPENTIN 300 MG/1
600 CAPSULE ORAL AT BEDTIME
Qty: 180 CAPSULE | Refills: 3 | Status: SHIPPED | OUTPATIENT
Start: 2025-04-24

## 2025-04-24 NOTE — PATIENT INSTRUCTIONS
In addition to scheduling an appointment with a podiatrist to evaluate your feet, I would recommend following up with [facility] to see one of their spine doctors.  Dr. Negro Bonilla is excellent.    Alternatively I can refer you to the [facility]

## 2025-05-12 ENCOUNTER — TRANSFERRED RECORDS (OUTPATIENT)
Dept: HEALTH INFORMATION MANAGEMENT | Facility: CLINIC | Age: 80
End: 2025-05-12

## 2025-05-20 ENCOUNTER — OFFICE VISIT (OUTPATIENT)
Dept: PODIATRY | Facility: CLINIC | Age: 80
End: 2025-05-20
Attending: INTERNAL MEDICINE
Payer: COMMERCIAL

## 2025-05-20 ENCOUNTER — PREP FOR PROCEDURE (OUTPATIENT)
Dept: OTHER | Facility: CLINIC | Age: 80
End: 2025-05-20

## 2025-05-20 ENCOUNTER — HOSPITAL ENCOUNTER (OUTPATIENT)
Facility: AMBULATORY SURGERY CENTER | Age: 80
End: 2025-05-20
Attending: PODIATRIST
Payer: COMMERCIAL

## 2025-05-20 ENCOUNTER — TELEPHONE (OUTPATIENT)
Dept: PODIATRY | Facility: CLINIC | Age: 80
End: 2025-05-20

## 2025-05-20 ENCOUNTER — ANCILLARY PROCEDURE (OUTPATIENT)
Dept: GENERAL RADIOLOGY | Facility: CLINIC | Age: 80
End: 2025-05-20
Attending: PODIATRIST
Payer: COMMERCIAL

## 2025-05-20 VITALS — BODY MASS INDEX: 37.67 KG/M2 | WEIGHT: 240 LBS | HEIGHT: 67 IN

## 2025-05-20 DIAGNOSIS — M24.576 CONTRACTURE OF JOINT OF ANKLE AND FOOT, UNSPECIFIED LATERALITY: ICD-10-CM

## 2025-05-20 DIAGNOSIS — M21.40 ACQUIRED PES PLANOVALGUS, UNSPECIFIED LATERALITY: ICD-10-CM

## 2025-05-20 DIAGNOSIS — M76.821 TIBIALIS POSTERIOR TENDINITIS, RIGHT: ICD-10-CM

## 2025-05-20 DIAGNOSIS — M24.573 CONTRACTURE OF JOINT OF ANKLE AND FOOT, UNSPECIFIED LATERALITY: ICD-10-CM

## 2025-05-20 DIAGNOSIS — M20.41 HAMMER TOE OF RIGHT FOOT: Primary | ICD-10-CM

## 2025-05-20 DIAGNOSIS — M20.42 HAMMERTOE, BILATERAL: Primary | ICD-10-CM

## 2025-05-20 DIAGNOSIS — M20.41 HAMMERTOE, BILATERAL: Primary | ICD-10-CM

## 2025-05-20 DIAGNOSIS — M20.41 HAMMER TOE OF RIGHT FOOT: ICD-10-CM

## 2025-05-20 PROCEDURE — 73630 X-RAY EXAM OF FOOT: CPT | Mod: LT | Performed by: PODIATRIST

## 2025-05-20 PROCEDURE — 99204 OFFICE O/P NEW MOD 45 MIN: CPT | Performed by: PODIATRIST

## 2025-05-20 NOTE — PATIENT INSTRUCTIONS
Qian,    Your surgery with [facility] has been scheduled. Please read thoroughly and follow instructions.     Procedure:   hammertoes right foot  Procedure Date :     *A surgery nurse will call you 1-2 days before surgery to inform you of the time of arrival for surgery.  Surgeon:   Dr. Jordan Maldonado  Admission Type:   Outpatient  Procedure Location:   [facility]:  [address] [place] [address] [address] [place] [address] (Fax: [phone])   Please enter through the main entrance. Take the elevators to the 3rd floor. Check in at  of [address].           Pre-Op Instructions    []  YOU MUST HAVE THESE ITEMS PRIOR TO SURGERY:     [] BOOT: Please obtain a CAM BOOT. You will wear this at all times, even when you go to bed. Please bring this with you on the day of surgery.  [] SHOWER PROTECTOR/CAST COVER: It is NOT OKAY to get your surgical site wet while bathing, you will need to purchase a cast cover to protect your foot from getting wet.   You can obtain/purchase any of the above devices at [facility] or order on-line at [employer].    []  You will need a Pre-op Physical within 30 days before surgery with your primary care provider. This exam is required by the anesthesiologist to ensure a safe surgical experience.    Failure  to obtain your pre-op physical will lead to cancellation of your surgery  Call them right away to schedule this. Please ensure your Preoperative Physical is faxed to the Hospital (numbers listed above)    []  Preoperative Medication Instructions  We would like you to stop your anticoagulation medications 3-5 days before surgery HOWEVER contact your prescribing provider for instructions before discontinuing any medications. (Examples: Coumadin, Plavix, Xarelto, Eliquis, Pradaxa, Effient, Brilinta) If you are on Coumadin, we would like the goal INR  1.4.  IT IS OK TO STAY ON ASPIRIN PRIOR TO SURGERY.   Hold Ibuprofen 24 hours prior.   Hold  Herbal Supplements and vitamins 14 days prior.   Stop Cialis, Levitra and Viagra 2-3 days before surgery.   Please discuss with your primary care provider if you need to hold any blood pressure medications or others.   If you take these diabetic medications, please discuss with your primary doctor and follow the hold instructions:   []  Hold seven (7) days prior for once weekly injectable doses [semaglutide (Ozempic, Wegovy), dulaglutide (Trulicity), exenatide ER (Bydureon), tirzepatide (Mounjaro)]  []  Hold the day before and day of for once daily injectable GLP-1 agonists [exenatide (Byetta), liraglutide (Saxenda, Victoza)]  []  Hold seven (7) days for oral semaglutide (Rybelsus)     []  Fasting Requirements  Nothing to eat for 8 hours before surgery unless instructed differently by the surgery nurse.  You may have clear liquids up to two hours before your arrival time (coffee, tea, water, or Gatorade. No cream or milk)  If your primary care provider has instructed you to continue oral medications, you may take them on the morning of surgery with a small sip of water.    No alcohol or smoking after 12:00am the day of surgery    []  COVID-19 test is no longer needed  If you are experiencing COVID symptoms or have tested positive for COVID-19 within 14 days of procedure date, reach out to the care team to reschedule please.     []  Contact your insurance regarding coverage  If you would like a [place] Estimate for your upcoming procedure at [facility], contact Cost of Care Estimates   Advocates are available Monday through Friday 8am - 5pm   [phone]  You may also submit a request online through your [employer] account.  For all self-pay, estimate, or anesthesia billing questions at [facility], the contact information is below:  Who to contact: [employer] LOPES  [facility] number: [phone]  Prepay number: [phone]    []  DO NOT BRING FMLA WITH TO SURGERY.  These  should be sent to the provider's office by fax to [phone].     []  Day of Surgery  Medications - Take as indicated with sips of water.   Wear comfortable loose fitting clothes. Wear your glasses-Not contacts. Do not wear jewelry and remove body piercing's. Surgery may be cancelled if they are not removed.   You may have 1 family member wait in the lobby during your surgery. Visitor restrictions are subject to change. Please verify with the surgery nurse when they call.   If same day surgery-Have a someone come with you to surgery that can help you understand the surgeon's instructions, drive you home and stay with you overnight the first night.          Post-Op Instructions    [] WEIGHT BEARING: You are to remain LIMITED WEIGHT BEARING on your right foot LIMITED WEIGHT BEARING MEANS THAT IT IS ONLY OKAY FOR YOU TO APPLY LIGHT PRESSURE ON THE AFFECTED FOOT WHEN TRANSFERRING FROM YOUR ASSISTIVE DEVICE TO A CHAIR OR BED.    [] BOOT: Prior to surgery you will need to obtain a CAM BOOT which you will need to WEAR THIS ANYTIME YOU ARE UP AND OUT OF BED, IT IS OKAY TO REMOVE WHEN YOU ARE SLEEPING.     [] DRESSING: During surgery Dr. Maldonado will apply a gauze dressing to your foot. This will remain intact until you are seen in clinic for follow up    [] ELEVATION: It is important that you elevate your affected foot after surgery. Proper elevation is raising your foot above your waist.          [] PROTEIN: It is important that you increase your protein intake after surgery. Protein is essential for wound healing. We recommend you take a protein supplement twice per day. This is in addition to your normal diet. Examples of protein supplements are Ensure, Boost, Glucerna (if you are diabetic), or protein powder. You can purchase these at your local retailer or grocery store.    [] CAST COVER: It is NOT OKAY to get your surgical site wet while bathing, you will need to purchase a cast cover to protect your foot from  getting wet. You can purchase this at [facility] or your local pharmacy.       Notify our office right away, if you have any changes in your health status, or if you develop a cold, flu,   diarrhea, infection, fever or sore throat before your scheduled surgery date.   We can be reached at [phone]   Monday-Friday 8 am-4:30 pm if you have any questions.     Thank you for trusting us with your care!            What are Prescription Custom Orthotics?  Custom orthotics are specially-made devices designed to support and comfort your feet. Prescription orthotics are crafted for you and no one else. They match the contours of your feet precisely and are designed for the way you move. Orthotics are only manufactured after a podiatrist has conducted a complete evaluation of your feet, ankles, and legs, so the orthotic can accommodate your unique foot structure and pathology.  Prescription orthotics are divided into two categories:  Functional orthotics are designed to control abnormal motion. They may be used to treat foot pain caused by abnormal motion; they can also be used to treat injuries such as shin splints or tendinitis. Functional orthotics are usually crafted of a semi-rigid material such as plastic or graphite.  Accommodative orthotics are softer and meant to provide additional cushioning and support. They can be used to treat diabetic foot ulcers, painful calluses on the bottom of the foot, and other uncomfortable conditions.  Podiatrists use orthotics to treat foot problems such as plantar fasciitis, bursitis, tendinitis, diabetic foot ulcers, and foot, ankle, and heel pain. Clinical research studies have shown that podiatrist-prescribed foot orthotics decrease foot pain and improve function.  Orthotics typically cost more than shoe inserts purchased in a retail store, but the additional cost is usually well worth it. Unlike shoe inserts, orthotics are molded to fit each individual foot, so you  can be sure that your orthotics fit and do what they're supposed to do. Prescription orthotics are also made of top-notch materials and last many years when cared for properly. Insurance often helps pay for prescription orthotics.  What are Shoe Inserts?   You've seen them at the grocery store and at the mall. You've probably even seen them on TV and online. Shoe inserts are any kind of non-prescription foot support designed to be worn inside a shoe. Pre-packaged, mass produced, arch supports are shoe inserts. So are the  custom-made  insoles and foot supports that you can order online or at retail stores. Unless the device has been prescribed by a doctor and crafted for your specific foot, it's a shoe insert, not a custom orthotic device--despite what the ads might say.  Shoe inserts can be very helpful for a variety of foot ailments, including flat arches and foot and leg pain. They can cushion your feet, provide comfort, and support your arches, but they can't correct biomechanical foot problems or cure long-standing foot issues.  The most common types of shoe inserts are:  Arch supports: Some people have high arches. Others have low arches or flat feet. Arch supports generally have a  bumped-up  appearance and are designed to support the foot's natural arch.   Insoles: Insoles slip into your shoe to provide extra cushioning and support. Insoles are often made of gel, foam, or plastic.   Heel liners: Heel liners, sometimes called heel pads or heel cups, provide extra cushioning in the heel region. They may be especially useful for patients who have foot pain caused by age-related thinning of the heels' natural fat pads.   Foot cushions: Do your shoes rub against your heel or your toes? Foot cushions come in many different shapes and sizes and can be used as a barrier between you and your shoe.  Choosing an Over-the-Counter Shoe Insert  Selecting a shoe insert from the wide variety of devices on the market can be  overwhelming. Here are some podiatrist-tested tips to help you find the insert that best meets your needs:  Consider your health. Do you have diabetes? Problems with circulation? An over-the-counter insert may not be your best bet. Diabetes and poor circulation increase your risk of foot ulcers and infections, so schedule an appointment with a podiatrist. He or she can help you select a solution that won't cause additional health problems.   Think about the purpose. Are you planning to run a marathon, or do you just need a little arch support in your work shoes? Look for a product that fits your planned level of activity.   Bring your shoes. For the insert to be effective, it has to fit into your shoes. So bring your sneakers, dress shoes, or work boots--whatever you plan to wear with your insert. Look for an insert that will fit the contours of your shoe.   Try them on. If all possible, slip the insert into your shoe and try it out. Walk around a little. How does it feel? Don't assume that feelings of pressure will go away with continued wear. (If you can't try the inserts at the store, ask about the store's return policy and hold on to your receipt.)    Please call one of the [place] locations below to schedule an appointment. If you received a prescription please bring it with you to your appointment. Some locations are limited to what they carry.    Office Locations    [facility]  [address] [place] [address]  Home Medical Equipment, [address]   Phone: [phone]   Orthotics and Prosthetics, [address]   Phone: [phone]    [facility]   Home Medical Equipment   [address], [place] [address]  Phone :[phone]  Orthotics and Prosthetics   [address], [address], [facility] [address]  Phone:[phone]          [facility] [place]  [address]  [address]   [place]  [address]   Phone: [phone]    [facility] Professional Bldg.  [address]  [place] [address]  Phone: [phone]    [facility]  [address]  [place] [address]  Phone: [phone]  Fax: [phone]    [facility] Medical Bldg[facility]   [address]  [place] [address]  Phone: [phone]    [facility]  [address]  [place] [address]  Phone: [phone]    [facility]  [address]  [address]  [place] [address]  Phone: [phone]    [place]   [address] [address]   Phone: [phone]    WEARING YOUR CUSTOM FOOT ORTHOTICS   Most insurance plans cover one pair of orthotics per year. You must check with your   insurance plan to see what your payment responsibility will be. Please call your   insurance company by calling the number on the back of your insurance card.   Orthotic's are non-refundable and non-returnable.   Orthotics are made of various designs. Some orthotics are covered with material that extends beyond your toes. If your orthotic is of this design, you will likely need to trim the toe end to get a proper fit. The insole from your shoe can be used as a template. Simply overlay the shoe insert on top of the custom orthotic. Align the heel end while tracing the length of the insert onto the custom orthotic. Use a large scissor to trim the toe end until you get a proper fit in the shoe.   The orthotic needs to be pushed as far back in the shoe as possible. The heel portion should not ride forward so as not to irritate your heel.   Orthotics are designed to work with socks. Excessive perspiration will shorten the life span of the orthotics. Remove the orthotic from the shoe frequently for proper drying.   The break-in period lasts for weeks.  People new to orthotics will likely experience new aches and pains. The orthotic is forcing your foot into a new position. Arch, foot and leg muscle aches and fatigue are common during these weeks. Minor discomfort can be considered normal break in phenomenon. Start wearing your orthotic around your home your first day. Limited activity for one to two hours is recommended. You can increase one or two additional hours each day provided the aches and pains are subsiding. The degree of discomfort, fatigue and problems will dictate the speed of break in. You may require multiple weeks to work up to full time use.   Do not continue wearing your orthotics if they are creating problems such as blisters or sores. Do not hesitate to call the clinic to speak with a nurse regarding orthotic   break in, fit, trimming, etc. You may also need to see the doctor if the orthotics are   simply not working out. Adjustments are sometimes made to improve orthotic   function.     Orthotics will only work in certain styles and types of shoes. Orthotics rarely work in dress shoes. Slip-ons, clogs, sandals and heels are particularly troublesome. Specially designed orthotics may be necessary for these types of shoes. Your custom orthotic was designed for activities that require appropriate walking or running shoes. Lace up athletic shoes, walking shoes or work boots should work appropriately. You may need a wider or longer shoe. Shoes with a removable  or insert work best. In general, you want to remove an insert from the shoe before placing the orthotic into the shoe. Shoes without a removable liner may not work as well.     When purchasing new shoes, bring your orthotics along to get a proper fit. Shop at stores that are familiar with orthotics.   Frequent washing of the orthotic may shorten the life span of the top cover. The top cover can be replaced but will generally last one to five years depending on use and foot  perspiration.     Please purchase a CAM boot. You can either purchase it from [employer] or online on [employer]. It should be a short boot, not a tall one.   They are typically sized Small/Medium/Large and go based on shoe size. Refer to the sizing guide for each boot brand.

## 2025-05-20 NOTE — LETTER
5/20/2025      Qian Lee  [address]  [facility] [address]      Dear Colleague,    Thank you for referring your patient, Qian Lee, to the [facility]. Please see a copy of my visit note below.          FOOT AND ANKLE SURGERY/PODIATRY CONSULT NOTE         ASSESSMENT:   Hammertoe  Joint contracture  Tibialis posterior tendinitis right  Pes planovalgus        TREATMENT:  I discussed the patient that she does have semirigid hammertoe deformities on both feet.    I reviewed the patient's bilateral foot x-rays which indicate contracted digits with midfoot degenerative changes.    We discussed surgical treatment options including arthroplasty procedure with associated extensor tenotomy and flexor tenotomy with release of joint contractures along MPJs.  Postop course to include limited and nonweightbearing x 3 to 4 weeks.  Risks include not limited to infection, need for additional surgical intervention.  Consent has been obtained.    We also reviewed that she does have mild pain along the course of the tibialis posterior tendon on the right arch.  Discussed etiology including excessive pronation.    I referred the patient to [facility] orthotics prosthetics for custom inserts.    [facility]    -Patient's questions invited and answered. She was encouraged to call my office with any further questions or concerns.     30 minutes spent on the day of encounter doing chart review, history and exam, documentation, and further activities as noted.     Jordan Maldonado DPM  [facility] Podiatry/Foot & Ankle Surgery      HPI: I was asked to see Qian Lee today for concerns related to painful hammertoes on both feet.  Patient reports that she has discomfort in shoe gear associated with hammertoes on both feet.  She admits to previous hammertoe surgery.  Denies smoking in recent  years.  She also complains of mild pain along the medial right arch over the past several weeks.  Denies known mechanism of injury.  Past medical history reviewed.    Past Medical History:   Diagnosis Date     Acne 09/09/2024     Age-related cataract of left eye 12/07/2023     Basal cell carcinoma (BCC) of skin of nose 09/09/2024     Chronic pain of both feet 04/24/2025     Chronic right shoulder pain 08/30/2021    Chronic rotator cuff tear     Class 2 severe obesity due to excess calories with serious comorbidity in adult (H) 08/30/2022     Essential hypertension 08/30/2022     History of bilateral knee replacement 08/30/2021     Hyperlipidemia 07/19/2016     Hypothyroidism 07/19/2016     Lumbar radiculopathy      Obesity (BMI 35.0-39.9 without comorbidity) 07/19/2016     Osteoarthritis of multiple joints 07/19/2016    B TKA     Overactive bladder 08/31/2023     Personal history of colonic polyps     Colonoscopy November 2019 with multiple polyps.  Colonoscopy December 2022 again with multiple polyps, repeat in 3 years     Sacroiliac joint pain 08/31/2023     Screening for osteoporosis     normal DXA 2018.  DEXA October 2023 with some decline but bone density remains normal     SNHL (sensorineural hearing loss) 12/06/2016     Tinnitus of both ears 12/06/2016     Vitamin D deficiency 09/01/2023         Social History     Socioeconomic History     Marital status: Single     Spouse name: Not on file     Number of children: Not on file     Years of education: Not on file     Highest education level: Not on file   Occupational History     Not on file   Tobacco Use     Smoking status: Never     Passive exposure: Never     Smokeless tobacco: Never   Vaping Use     Vaping status: Never Used   Substance and Sexual Activity     Alcohol use: Yes     Comment: infrequent     Drug use: Never     Sexual activity: Not on file   Other Topics Concern     Not on file   Social History Narrative     2 CHILDREN, RETIRED many roles  ,      Social Drivers of Health     Financial Resource Strain: Low Risk  (9/8/2024)    Financial Resource Strain      Within the past 12 months, have you or your family members you live with been unable to get utilities (heat, electricity) when it was really needed?: No   Food Insecurity: Low Risk  (9/8/2024)    Food Insecurity      Within the past 12 months, did you worry that your food would run out before you got money to buy more?: No      Within the past 12 months, did the food you bought just not last and you didn t have money to get more?: No   Transportation Needs: Low Risk  (9/8/2024)    Transportation Needs      Within the past 12 months, has lack of transportation kept you from medical appointments, getting your medicines, non-medical meetings or appointments, work, or from getting things that you need?: No   Physical Activity: Insufficiently Active (9/8/2024)    Exercise Vital Sign      Days of Exercise per Week: 2 days      Minutes of Exercise per Session: 20 min   Stress: No Stress Concern Present (9/8/2024)    [place] [employer] - Occupational Stress Questionnaire      Feeling of Stress : Only a little   Social Connections: Unknown (9/8/2024)    Social Connection and Isolation Panel [NHANES]      Frequency of Communication with Friends and Family: Not on file      Frequency of Social Gatherings with Friends and Family: Twice a week      Attends Ep[employer] Services: Not on file      Active Member of Clubs or Organizations: Not on file      Attends Club or Organization Meetings: Not on file      Marital Status: Not on file   Interpersonal Safety: Low Risk  (4/24/2025)    Interpersonal Safety      Do you feel physically and emotionally safe where you currently live?: Yes      Within the past 12 months, have you been hit, slapped, kicked or otherwise physically hurt by someone?: No      Within the past 12 months, have you been humiliated or emotionally  "abused in other ways by your partner or ex-partner?: No   Housing Stability: Low Risk  (9/8/2024)    Housing Stability      Do you have housing? : Yes      Are you worried about losing your housing?: No            Allergies   Allergen Reactions     Penicillin [Penicillin G] Hives     As a child     Nitrofurantoin Nausea     Statins-Hmg-Coa Reductase Inhibitors [Statins] Muscle Pain (Myalgia)         MEDICATIONS:   Current Outpatient Medications   Medication Sig Dispense Refill     acetaminophen (TYLENOL) 500 MG tablet Acetaminophen Extra Strength 500 mg tablet   500mg 2/day       clindamycin (CLEOCIN T) 1 % external lotion Apply topically 2 times daily.       fish oil-omega-3 fatty acids 1000 MG capsule Take 1 capsule by mouth daily       gabapentin (NEURONTIN) 300 MG capsule Take 2 capsules (600 mg) by mouth at bedtime. 180 capsule 3     levothyroxine (SYNTHROID/LEVOTHROID) 125 MCG tablet TAKE 1 TABLET DAILY 90 tablet 3     losartan (COZAAR) 100 MG tablet Take 1 tablet (100 mg) by mouth daily. 90 tablet 3     oxyBUTYnin ER (DITROPAN XL) 5 MG 24 hr tablet Take 1 tablet (5 mg) by mouth daily. 90 tablet 3     pravastatin (PRAVACHOL) 20 MG tablet TAKE 1 TABLET EVERY EVENING 90 tablet 3     vitamin D3 (CHOLECALCIFEROL) 50 mcg (2000 units) tablet Take 1 tablet (50 mcg) by mouth daily       No current facility-administered medications for this visit.        Family History   Problem Relation Age of Onset     Peripheral Vascular Disease Mother         d 93yo     Coronary Artery Disease Mother      Coronary Artery Disease Father         d 82yo     Rectal Cancer Father      ALS Sister      Hypertension Sister      Melanoma Brother           Review of Systems - 10 point Review of Systems is negative except for right arch pain which is noted in HPI.    OBJECTIVE:  Appearance: alert, well appearing, and in no distress.    VITAL SIGNS: Ht 1.702 m (5' 7\")   Wt 108.9 kg (240 lb)   LMP  (LMP Unknown)   BMI 37.59 kg/m  "       General appearance: Patient is alert and fully cooperative with history & exam.  No sign of distress is noted during the visit.     Psychiatric: Affect is pleasant & appropriate.  Patient appears motivated to improve health.     Respiratory: Breathing is regular & unlabored while sitting.     HEENT: Hearing is intact to spoken word.  Speech is clear.  No gross evidence of visual impairment that would impact ambulation.      Vascular: Dorsalis pedis palpable bilaterally.  Dermatologic: Turgor and texture are within normal limits. No coloration or temperature changes. No primary or secondary lesions noted.  Neurologic: All epicritic and proprioceptive sensations are grossly intact bilateral.  Musculoskeletal: Semirigid contracture digits 2 through 5 bilaterally.  Mild pain on palpation along course of tibialis posterior tendon right.  Decreased medial longitudinal arch bilaterally.  Pes planovalgus    Again, thank you for allowing me to participate in the care of your patient.        Sincerely,        Jordan Maldonado DPM    Electronically signed

## 2025-05-20 NOTE — PROGRESS NOTES
FOOT AND ANKLE SURGERY/PODIATRY CONSULT NOTE         ASSESSMENT:   Hammertoe  Joint contracture  Tibialis posterior tendinitis right  Pes planovalgus        TREATMENT:  I discussed the patient that she does have semirigid hammertoe deformities on both feet.    I reviewed the patient's bilateral foot x-rays which indicate contracted digits with midfoot degenerative changes.    We discussed surgical treatment options including arthroplasty procedure with associated extensor tenotomy and flexor tenotomy with release of joint contractures along MPJs.  Postop course to include limited and nonweightbearing x 3 to 4 weeks.  Risks include not limited to infection, need for additional surgical intervention.  Consent has been obtained.    We also reviewed that she does have mild pain along the course of the tibialis posterior tendon on the right arch.  Discussed etiology including excessive pronation.    I referred the patient to [facility] orthotics prosthetics for custom inserts.    I will ask my [facility]    -Patient's questions invited and answered. She was encouraged to call my office with any further questions or concerns.     30 minutes spent on the day of encounter doing chart review, history and exam, documentation, and further activities as noted.     Jordan Maldonado DPM  [facility] Podiatry/Foot & Ankle Surgery      HPI: I was asked to see Qian Lee today for concerns related to painful hammertoes on both feet.  Patient reports that she has discomfort in shoe gear associated with hammertoes on both feet.  She admits to previous hammertoe surgery.  Denies smoking in recent years.  She also complains of mild pain along the medial right arch over the past several weeks.  Denies known mechanism of injury.  Past medical history reviewed.    Past Medical History:   Diagnosis Date    Acne 09/09/2024    Age-related cataract of left eye  12/07/2023    Basal cell carcinoma (BCC) of skin of nose 09/09/2024    Chronic pain of both feet 04/24/2025    Chronic right shoulder pain 08/30/2021    Chronic rotator cuff tear    Class 2 severe obesity due to excess calories with serious comorbidity in adult (H) 08/30/2022    Essential hypertension 08/30/2022    History of bilateral knee replacement 08/30/2021    Hyperlipidemia 07/19/2016    Hypothyroidism 07/19/2016    Lumbar radiculopathy     Obesity (BMI 35.0-39.9 without comorbidity) 07/19/2016    Osteoarthritis of multiple joints 07/19/2016    B TKA    Overactive bladder 08/31/2023    Personal history of colonic polyps     Colonoscopy November 2019 with multiple polyps.  Colonoscopy December 2022 again with multiple polyps, repeat in 3 years    Sacroiliac joint pain 08/31/2023    Screening for osteoporosis     normal DXA 2018.  DEXA October 2023 with some decline but bone density remains normal    SNHL (sensorineural hearing loss) 12/06/2016    Tinnitus of both ears 12/06/2016    Vitamin D deficiency 09/01/2023         Social History     Socioeconomic History    Marital status: Single     Spouse name: Not on file    Number of children: Not on file    Years of education: Not on file    Highest education level: Not on file   Occupational History    Not on file   Tobacco Use    Smoking status: Never     Passive exposure: Never    Smokeless tobacco: Never   Vaping Use    Vaping status: Never Used   Substance and Sexual Activity    Alcohol use: Yes     Comment: infrequent    Drug use: Never    Sexual activity: Not on file   Other Topics Concern    Not on file   Social History Narrative     2 CHILDREN, RETIRED many roles ,      Social Drivers of Health     Financial Resource Strain: Low Risk  (9/8/2024)    Financial Resource Strain     Within the past 12 months, have you or your family members you live with been unable to get utilities (heat, electricity) when it was really needed?:  No   Food Insecurity: Low Risk  (9/8/2024)    Food Insecurity     Within the past 12 months, did you worry that your food would run out before you got money to buy more?: No     Within the past 12 months, did the food you bought just not last and you didn t have money to get more?: No   Transportation Needs: Low Risk  (9/8/2024)    Transportation Needs     Within the past 12 months, has lack of transportation kept you from medical appointments, getting your medicines, non-medical meetings or appointments, work, or from getting things that you need?: No   Physical Activity: Insufficiently Active (9/8/2024)    Exercise Vital Sign     Days of Exercise per Week: 2 days     Minutes of Exercise per Session: 20 min   Stress: No Stress Concern Present (9/8/2024)    [place] [employer] - Occupational Stress Questionnaire     Feeling of Stress : Only a little   Social Connections: Unknown (9/8/2024)    Social Connection and Isolation Panel [NHANES]     Frequency of Communication with Friends and Family: Not on file     Frequency of Social Gatherings with Friends and Family: Twice a week     Attends Worship Services: Not on file     Active Member of Clubs or Organizations: Not on file     Attends Club or Organization Meetings: Not on file     Marital Status: Not on file   Interpersonal Safety: Low Risk  (4/24/2025)    Interpersonal Safety     Do you feel physically and emotionally safe where you currently live?: Yes     Within the past 12 months, have you been hit, slapped, kicked or otherwise physically hurt by someone?: No     Within the past 12 months, have you been humiliated or emotionally abused in other ways by your partner or ex-partner?: No   Housing Stability: Low Risk  (9/8/2024)    Housing Stability     Do you have housing? : Yes     Are you worried about losing your housing?: No            Allergies   Allergen Reactions    Penicillin [Penicillin G] Hives     As a child    Nitrofurantoin  "Nausea    Statins-Hmg-Coa Reductase Inhibitors [Statins] Muscle Pain (Myalgia)         MEDICATIONS:   Current Outpatient Medications   Medication Sig Dispense Refill    acetaminophen (TYLENOL) 500 MG tablet Acetaminophen Extra Strength 500 mg tablet   500mg 2/day      clindamycin (CLEOCIN T) 1 % external lotion Apply topically 2 times daily.      fish oil-omega-3 fatty acids 1000 MG capsule Take 1 capsule by mouth daily      gabapentin (NEURONTIN) 300 MG capsule Take 2 capsules (600 mg) by mouth at bedtime. 180 capsule 3    levothyroxine (SYNTHROID/LEVOTHROID) 125 MCG tablet TAKE 1 TABLET DAILY 90 tablet 3    losartan (COZAAR) 100 MG tablet Take 1 tablet (100 mg) by mouth daily. 90 tablet 3    oxyBUTYnin ER (DITROPAN XL) 5 MG 24 hr tablet Take 1 tablet (5 mg) by mouth daily. 90 tablet 3    pravastatin (PRAVACHOL) 20 MG tablet TAKE 1 TABLET EVERY EVENING 90 tablet 3    vitamin D3 (CHOLECALCIFEROL) 50 mcg (2000 units) tablet Take 1 tablet (50 mcg) by mouth daily       No current facility-administered medications for this visit.        Family History   Problem Relation Age of Onset    Peripheral Vascular Disease Mother         d 93yo    Coronary Artery Disease Mother     Coronary Artery Disease Father         d 82yo    Rectal Cancer Father     ALS Sister     Hypertension Sister     Melanoma Brother           Review of Systems - 10 point Review of Systems is negative except for right arch pain which is noted in HPI.    OBJECTIVE:  Appearance: alert, well appearing, and in no distress.    VITAL SIGNS: Ht 1.702 m (5' 7\")   Wt 108.9 kg (240 lb)   LMP  (LMP Unknown)   BMI 37.59 kg/m        General appearance: Patient is alert and fully cooperative with history & exam.  No sign of distress is noted during the visit.     Psychiatric: Affect is pleasant & appropriate.  Patient appears motivated to improve health.     Respiratory: Breathing is regular & unlabored while sitting.     HEENT: Hearing is intact to spoken word.  " Speech is clear.  No gross evidence of visual impairment that would impact ambulation.      Vascular: Dorsalis pedis palpable bilaterally.  Dermatologic: Turgor and texture are within normal limits. No coloration or temperature changes. No primary or secondary lesions noted.  Neurologic: All epicritic and proprioceptive sensations are grossly intact bilateral.  Musculoskeletal: Semirigid contracture digits 2 through 5 bilaterally.  Mild pain on palpation along course of tibialis posterior tendon right.  Decreased medial longitudinal arch bilaterally.  Pes planovalgus

## 2025-05-20 NOTE — TELEPHONE ENCOUNTER
Surgery Scheduled    Discussed surgery plans/instructions. The pt will schedule a pre-op physical. Will send surgery packet once medications have been reviewed.    Surgery/Procedure: ARTHROPLASTY digits two,three, four, five right foot (Right)     Special Equipment: CPT: 89945    Equipment: Arthrex trim fit pin, sagittal saw, McGlamery elevators    Location: [facility]:  [address] [place] [address] [address] [place] [address] (Fax: [phone])   Please enter through the main entrance. Take the elevators to the 3rd floor. Check in at  of [address].     Date: 07/07/2025    Time: 1:00 pm    Admission Type: Outpatient    Surgeon: Dr. Jordan Maldonado    OR Confirmed & :  Yes sent to [facility] schedulers via email on 5/20/2025. Waiting for confirmation    IR Tech Needed:  No     Entered on provider calendar:  Yes    Post Op: See appt desk    Wound Vac Needed: No    Home Care Needed: No    Ultrasound Scheduled: Not needed    Reps Contacted: Not needed    Blood Thinners Addressed: Message sent to support pool to review the patients medications

## 2025-05-22 ENCOUNTER — TELEPHONE (OUTPATIENT)
Dept: VASCULAR SURGERY | Facility: CLINIC | Age: 80
End: 2025-05-22
Payer: COMMERCIAL

## 2025-05-22 NOTE — TELEPHONE ENCOUNTER
Spoke with Qian and dicussed the boot needed should be with the air and short. She will order off [employer].

## 2025-05-22 NOTE — TELEPHONE ENCOUNTER
Caller: Qian    Provider: Dr. Jordan Maldonado    Detailed reason for call: Qian is calling wondering what type of CAM boot Dr. Maldonado would like her to purchase, with or without air? She was told to look on [employer] and purchase from there.      Best phone number to contact: [phone]    Best time to contact: any    Ok to leave a detailed message: Yes    Ok to speak to authorized person if needed: No      (Noted to patient if reason is related to wound or incision, to please send a photo via email or Powerset.)

## 2025-06-02 ENCOUNTER — TRANSFERRED RECORDS (OUTPATIENT)
Dept: HEALTH INFORMATION MANAGEMENT | Facility: CLINIC | Age: 80
End: 2025-06-02
Payer: COMMERCIAL

## 2025-06-09 ENCOUNTER — NURSE TRIAGE (OUTPATIENT)
Dept: INTERNAL MEDICINE | Facility: CLINIC | Age: 80
End: 2025-06-09
Payer: COMMERCIAL

## 2025-06-09 DIAGNOSIS — I10 ESSENTIAL HYPERTENSION: ICD-10-CM

## 2025-06-09 DIAGNOSIS — M62.830 SPASM OF BACK MUSCLES: Primary | ICD-10-CM

## 2025-06-09 RX ORDER — TIZANIDINE 2 MG/1
2 TABLET ORAL 3 TIMES DAILY PRN
Qty: 20 TABLET | Refills: 0 | Status: SHIPPED | OUTPATIENT
Start: 2025-06-09

## 2025-06-09 NOTE — TELEPHONE ENCOUNTER
Nurse Triage SBAR    Is this a 2nd Level Triage? YES, LICENSED PRACTITIONER REVIEW IS REQUIRED    Situation: Patient calling into clinic with back pain    Background:   Hx: obesity, sacroiliac joint pain, lumbar radiculoppathy    Had similar pain previously in December from pulled muscle    Patient is currently on a cruise in [place] and can't come in to the clinic for appointment    Assessment:   Believes she has a pulled muscle  Pain started 3 days ago  Pain at the waistline (lower back) to the right of the spine  Rates 5-6/10 at time of call, increases to 9/10 at night time  Is constantly present, but seems to peak in late afternoon/at night  Denies radiation into the legs  Denies recent back overuse  Currently using Arthritis Tylenol and salonpas  Denies weakness or numbness, denies issues with bowel or bladder control  Denies fever, abdominal pain, burning with urination, blood in urine    Protocol Recommended Disposition:   See in Office Within 3 Days    Recommendation: Provided patient with the above recommended disposition. Patient reports she is currently on a cruise and unable to come into the clinic. Patient is ashore today and was therefore able to call. Advised that the above information will be sent to the provider to review and advise next steps. Provided care advice. Advised to call back with any fever, numbness or weakness, loss of bowel or bladder control, severe pain, pain beginning to radiate into the legs, pain becoming worse, or any new or worsening symptoms. Patient endorses understanding. Denies further questions or concerns at this time.    Routed to provider    Does the patient meet one of the following criteria for ADS visit consideration? 16+ years old, with an [facility] PCP     Ruba Linton RN  [facility]      TIP  Providers, please consider if this condition is appropriate for management at one of our Acute and Diagnostic Services sites.     If patient is a good  candidate, please use dotphrase <dot>triageresponse and select Refer to ADS to document.     Reason for Disposition   MODERATE back pain (e.g., interferes with normal activities) and present > 3 days    Additional Information   Negative: Passed out (e.g., fainted, lost consciousness, blacked out and was not responding)   Negative: Shock suspected (e.g., cold/pale/clammy skin, too weak to stand, low BP, rapid pulse)   Negative: Sounds like a life-threatening emergency to the triager   Negative: Major injury to the back (e.g., MVA, fall > 10 feet or 3 meters, penetrating injury, etc.)   Negative: Pain in the upper back over the ribs (rib cage) that radiates (travels) into the chest   Negative: Pain in the upper back over the ribs (rib cage) and worsened by coughing (or clearly increases with breathing)   Negative: Back pain during pregnancy   Negative: SEVERE back pain of sudden onset and age > 60 years   Negative: SEVERE abdominal pain (e.g., excruciating)   Negative: Abdominal pain and age > 60 years   Negative: Unable to urinate (or only a few drops) and bladder feels very full   Negative: Loss of bladder or bowel control (urine or bowel incontinence; wetting self, leaking stool) of new-onset   Negative: Numbness (loss of sensation) in groin or rectal area   Negative: Pain radiates into groin, scrotum   Negative: Blood in urine (red, pink, or tea-colored)   Negative: Vomiting and pain over lower ribs of back (i.e., flank - kidney area)   Negative: Weakness of a leg or foot (e.g., unable to bear weight, dragging foot)   Negative: Patient sounds very sick or weak to the triager   Negative: Fever > 100.4 F (38.0 C) and flank pain   Negative: Pain or burning with passing urine (urination)   Negative: SEVERE back pain (e.g., excruciating, unable to do any normal activities) and not improved after pain medicine and CARE ADVICE   Negative: Numbness in an arm or hand (i.e., loss of sensation) and upper back pain    "Negative: Numbness in a leg or foot (i.e., loss of sensation)   Negative: High-risk adult (e.g., history of cancer, history of HIV, or history of IV Drug Use)   Negative: Soft tissue infection (e.g., abscess, cellulitis) or other serious infection (e.g., bacteremia) in last 2 weeks   Negative: Painful rash with multiple small blisters grouped together (i.e., dermatomal distribution or 'band' or 'stripe')   Negative: Pain radiates into the thigh or further down the leg, and in both legs   Negative: Age > 50 and no history of prior similar back pain    Answer Assessment - Initial Assessment Questions  1. ONSET: \"When did the pain begin?\" (e.g., minutes, hours, days)      Three days ago  Has had similar pain to this previously in December  2. LOCATION: \"Where does it hurt?\" (upper, mid or lower back)      At the waistline, to the right of the spine, lower back  3. SEVERITY: \"How bad is the pain?\"  (e.g., Scale 1-10; mild, moderate, or severe)      5-6/10, gets up to about a 9/10 at night time  4. PATTERN: \"Is the pain constant?\" (e.g., yes, no; constant, intermittent)       Seems to peak late afternoon/at night, but is constantly present just worsens at night  5. RADIATION: \"Does the pain shoot into your legs or somewhere else?\"      denies  6. CAUSE:  \"What do you think is causing the back pain?\"       Thinks pulled a muscle  7. BACK OVERUSE:  \"Any recent lifting of heavy objects, strenuous work or exercise?\"      denies  8. MEDICINES: \"What have you taken so far for the pain?\" (e.g., nothing, acetaminophen, NSAIDS)      Arthritis tylenol, salonpas  9. NEUROLOGIC SYMPTOMS: \"Do you have any weakness, numbness, or problems with bowel/bladder control?\"      Denies weakness or numbness, denies issues with bowel or bladder control  10. OTHER SYMPTOMS: \"Do you have any other symptoms?\" (e.g., fever, abdomen pain, burning with urination, blood in urine)        Denies fever, abdominal pain, burning with urination blood in " "urine  11. PREGNANCY: \"Is there any chance you are pregnant?\" \"When was your last menstrual period?\"        NA    Protocols used: Back Pain-A-[place]    "

## 2025-06-09 NOTE — TELEPHONE ENCOUNTER
I suppose I could call her in a muscle relaxant if she thinks this is a muscle injury/spasm in her back.  Is there a pharmacy available at her location?  It might be best for her to be evaluated by the ship doctor or local urgent care to best understand what is responsible for her symptoms

## 2025-06-09 NOTE — TELEPHONE ENCOUNTER
Outgoing call to patient, she would like a muscle relaxant sent to the pharmacy at her current location. It is the [facility] pharmacy in [facility]. Patient notes she is only in [place] for tonight and will be leaving tomorrow morning. Advised she should additionally see a ship doctor or local urgent care. Patient notes she thinks the pain is likely from pulling her suitcases through the airport.    The [facility] pharmacy in [place], [place] phone number is [phone]    Routing back to PCP for prescription

## 2025-06-09 NOTE — TELEPHONE ENCOUNTER
Contacts       Contact Date/Time Type Contact Phone/Fax    06/09/2025 10:49 AM CDT Phone (Incoming) Qian Lee SOLIS (Self) [phone] (M)     ok to leave a detailed message    06/09/2025 12:37 PM CDT Phone (Outgoing) Qian Lee SOLIS (Self) [phone] (M)    Talked with Patient     06/09/2025 01:04 PM CDT Phone (Outgoing) Qian Lee (Self) [phone] (M)    Left Message           Attempted to reach patient to: Relay a message    Regarding: PCP response below.     Action to take: Left detailed message on patient's line as indicated with provider's response below. Advised patient to call if she has any further questions. Sending her a [employer] message as well.

## 2025-06-30 ENCOUNTER — OFFICE VISIT (OUTPATIENT)
Dept: INTERNAL MEDICINE | Facility: CLINIC | Age: 80
End: 2025-06-30
Payer: COMMERCIAL

## 2025-06-30 VITALS
DIASTOLIC BLOOD PRESSURE: 96 MMHG | OXYGEN SATURATION: 95 % | TEMPERATURE: 98.4 F | BODY MASS INDEX: 37.67 KG/M2 | HEIGHT: 67 IN | HEART RATE: 59 BPM | WEIGHT: 240 LBS | RESPIRATION RATE: 14 BRPM | SYSTOLIC BLOOD PRESSURE: 168 MMHG

## 2025-06-30 DIAGNOSIS — E66.01 CLASS 2 SEVERE OBESITY DUE TO EXCESS CALORIES WITH SERIOUS COMORBIDITY AND BODY MASS INDEX (BMI) OF 37.0 TO 37.9 IN ADULT (H): ICD-10-CM

## 2025-06-30 DIAGNOSIS — Z01.818 PREOP GENERAL PHYSICAL EXAM: Primary | ICD-10-CM

## 2025-06-30 DIAGNOSIS — N32.81 OVERACTIVE BLADDER: ICD-10-CM

## 2025-06-30 DIAGNOSIS — E66.812 CLASS 2 SEVERE OBESITY DUE TO EXCESS CALORIES WITH SERIOUS COMORBIDITY AND BODY MASS INDEX (BMI) OF 37.0 TO 37.9 IN ADULT (H): ICD-10-CM

## 2025-06-30 DIAGNOSIS — M20.41 HAMMER TOE OF RIGHT FOOT: ICD-10-CM

## 2025-06-30 DIAGNOSIS — E03.9 ACQUIRED HYPOTHYROIDISM: ICD-10-CM

## 2025-06-30 DIAGNOSIS — I10 ESSENTIAL HYPERTENSION: ICD-10-CM

## 2025-06-30 DIAGNOSIS — R21 RASH: ICD-10-CM

## 2025-06-30 LAB
ANION GAP SERPL CALCULATED.3IONS-SCNC: 12 MMOL/L (ref 7–15)
BUN SERPL-MCNC: 16 MG/DL (ref 8–23)
CALCIUM SERPL-MCNC: 10 MG/DL (ref 8.8–10.4)
CHLORIDE SERPL-SCNC: 103 MMOL/L (ref 98–107)
CREAT SERPL-MCNC: 1.15 MG/DL (ref 0.51–0.95)
EGFRCR SERPLBLD CKD-EPI 2021: 49 ML/MIN/1.73M2
ERYTHROCYTE [DISTWIDTH] IN BLOOD BY AUTOMATED COUNT: 12.5 % (ref 10–15)
GLUCOSE SERPL-MCNC: 99 MG/DL (ref 70–99)
HCO3 SERPL-SCNC: 26 MMOL/L (ref 22–29)
HCT VFR BLD AUTO: 41.1 % (ref 35–47)
HGB BLD-MCNC: 13.7 G/DL (ref 11.7–15.7)
MCH RBC QN AUTO: 30.1 PG (ref 26.5–33)
MCHC RBC AUTO-ENTMCNC: 33.3 G/DL (ref 31.5–36.5)
MCV RBC AUTO: 90 FL (ref 78–100)
PLATELET # BLD AUTO: 194 10E3/UL (ref 150–450)
POTASSIUM SERPL-SCNC: 4.4 MMOL/L (ref 3.4–5.3)
RBC # BLD AUTO: 4.55 10E6/UL (ref 3.8–5.2)
SODIUM SERPL-SCNC: 141 MMOL/L (ref 135–145)
WBC # BLD AUTO: 7.2 10E3/UL (ref 4–11)

## 2025-06-30 PROCEDURE — G2211 COMPLEX E/M VISIT ADD ON: HCPCS | Performed by: INTERNAL MEDICINE

## 2025-06-30 PROCEDURE — 3077F SYST BP >= 140 MM HG: CPT | Performed by: INTERNAL MEDICINE

## 2025-06-30 PROCEDURE — 99214 OFFICE O/P EST MOD 30 MIN: CPT | Performed by: INTERNAL MEDICINE

## 2025-06-30 PROCEDURE — 80048 BASIC METABOLIC PNL TOTAL CA: CPT | Performed by: INTERNAL MEDICINE

## 2025-06-30 PROCEDURE — 3080F DIAST BP >= 90 MM HG: CPT | Performed by: INTERNAL MEDICINE

## 2025-06-30 PROCEDURE — 36415 COLL VENOUS BLD VENIPUNCTURE: CPT | Performed by: INTERNAL MEDICINE

## 2025-06-30 PROCEDURE — 85027 COMPLETE CBC AUTOMATED: CPT | Performed by: INTERNAL MEDICINE

## 2025-06-30 RX ORDER — CLOTRIMAZOLE AND BETAMETHASONE DIPROPIONATE 10; .64 MG/G; MG/G
CREAM TOPICAL 2 TIMES DAILY
Qty: 45 G | Refills: 1 | Status: SHIPPED | OUTPATIENT
Start: 2025-06-30

## 2025-06-30 RX ORDER — HYDROCHLOROTHIAZIDE 12.5 MG/1
12.5 TABLET ORAL EVERY OTHER DAY
Qty: 45 TABLET | Refills: 3 | Status: SHIPPED | OUTPATIENT
Start: 2025-06-30

## 2025-06-30 NOTE — PATIENT INSTRUCTIONS
How to Take Your Medication Before Surgery  Preoperative Medication Instructions     Avoid aspirin and over-the-counter NSAIDs including ibuprofen, Motrin, Advil and Aleve for the week prior to surgery.  You may use Tylenol (acetaminophen) if you need something for pain    Hold fish oil for 1 week prior to surgery    On the morning of surgery, take your usual doses of losartan, levothyroxine and oxybutynin.  Hold all other medications and supplements that morning including hydrochlorothiazide           Patient Education   Preparing for Your Surgery  For Adults  Getting started  In most cases, a nurse will call to review your health history and instructions. They will give you an arrival time based on your scheduled surgery time. Please be ready to share:  Your doctor's clinic name and phone number  Your medical, surgical, and anesthesia history  A list of allergies and sensitivities  A list of medicines, including herbal treatments and over-the-counter drugs  Whether the patient has a legal guardian (ask how to send us the papers in advance)  Note: You may not receive a call if you were seen at our PAC (Preoperative Assessment Center).  Please tell us if you're pregnant--or if there's any chance you might be pregnant. Some surgeries may injure a fetus (unborn baby), so they require a pregnancy test. Surgeries that are safe for a fetus don't always need a test, and you can choose whether to have one.   Preparing for surgery  Within 10 to 30 days of surgery: Have a pre-op exam (sometimes called an H&P, or History and Physical). This can be done at a clinic or pre-operative center.  If you're having a , you may not need this exam. Talk to your care team.  At your pre-op exam, talk to your care team about all medicines you take. (This includes CBD oil and any drugs, such as THC, marijuana, and other forms of cannabis.) If you need to stop any medicine before surgery, ask when to start taking it again.  This is  for your safety. Many medicines and drugs can make you bleed too much during surgery. Some change how well surgery (anesthesia) drugs work.  Call your insurance company to let them know you're having surgery. (If you don't have insurance, call [phone].)  Call your clinic if there's any change in your health. This includes a scrape or scratch near the surgery site, or any signs of a cold (sore throat, runny nose, cough, rash, fever).  Eating and drinking guidelines  For your safety: Unless your surgeon tells you otherwise, follow the guidelines below.  Eat and drink as normal until 8 hours before you arrive for surgery. After that, no food or milk. You can spit out gum when you arrive.  Drink clear liquids until 2 hours before you arrive. These are liquids you can see through, like water, Gatorade, and Propel Water. They also include plain black coffee and tea (no cream or milk).  No alcohol for 24 hours before you arrive. The night before surgery, stop any drinks that contain THC.  If your care team tells you to take medicine on the morning of surgery, it's okay to take it with a sip of water. No other medicines or drugs are allowed (including CBD oil)--follow your care team's instructions.  If you have questions the day of surgery, call your hospital or surgery center.   Preventing infection  Shower or bathe the night before and the morning of surgery. Follow the instructions your clinic gave you. (If no instructions, use regular soap.)  Don't shave or clip hair near your surgery site. We'll remove the hair if needed.  Don't smoke or vape the morning of surgery. No chewing tobacco for 6 hours before you arrive. A nicotine patch is okay. You may spit out nicotine gum when you arrive.  For some surgeries, the surgeon will tell you to fully quit smoking and nicotine.  We will make every effort to keep you safe from infection. We will:  Clean our hands often with soap and water (or an alcohol-based hand  rub).  Clean the skin at your surgery site with a special soap that kills germs.  Give you a special gown to keep you warm. (Cold raises the risk of infection.)  Wear hair covers, masks, gowns, and gloves during surgery.  Give antibiotic medicine, if prescribed. Not all surgeries need this medicine.  What to bring on the day of surgery  Photo ID and insurance card  Copy of your health care directive, if you have one  Glasses and hearing aids (bring cases)  You can't wear contacts during surgery  Inhaler and eye drops, if you use them (tell us about these when you arrive)  CPAP machine or breathing device, if you use them  A few personal items, if spending the night  If you have . . .  A pacemaker, ICD (cardiac defibrillator), or other implant: Bring the ID card.  An implanted stimulator: Bring the remote control.  A legal guardian: Bring a copy of the certified (court-stamped) guardianship papers.  Please remove any jewelry, including body piercings. Leave jewelry and other valuables at home.  If you're going home the day of surgery  You must have a support person drive you home. They should stay with you overnight, and they may need to help with your self-care.  If you don't have a support person, please tells us as soon as possible. We can help.  After surgery  If it's hard to control your pain or you need more pain medicine, please call your surgeon's office.  Questions?   If you have any questions for your care team, list them here:   ____________________________________________________________________________________________________________________________________________________________________________________________________________________________________________________________  For informational purposes only. Not to replace the advice of your health care provider. Copyright   2003, 2019 [facility]. All rights reserved. Clinically reviewed by Homer Hammond MD. [employer] [id number] - REV  02/25.

## 2025-06-30 NOTE — PROGRESS NOTES
Preoperative Evaluation  [facility]  [address] [facility] [address]  Phone: [phone]  Fax: [phone]  Primary Provider: Chilo Harris MD  Pre-op Performing Provider: Chilo Harris MD  Jun 30, 2025 6/29/2025   Surgical Information   What procedure is being done? Hammer toe surgery   Facility or Hospital where procedure/surgery will be performed: [facility]   Who is doing the procedure / surgery? Dr. Maldonado   Date of surgery / procedure: July 7, 2025   Time of surgery / procedure: Noon   Where do you plan to recover after surgery? at home alone     Fax number for surgical facility: Note does not need to be faxed, will be available electronically in Epic.    Assessment & Plan     The proposed surgical procedure is considered INTERMEDIATE risk.    Preop general physical exam  79-year-old woman here for preop clearance prior to upcoming foot surgery for hammertoes involving her right foot.  She has tolerated previous surgery and anesthesia without any complications.  Overall risk is low and she is cleared to proceed as planned.  She will avoid aspirin and NSAIDs during the week prior to surgery and will also hold her fish oil.  - Basic metabolic panel  (Ca, Cl, CO2, Creat, Gluc, K, Na, BUN); Future  - CBC with platelets; Future    Hammer toe of right foot  Hammertoes involving right foot becoming more symptomatic with plans for surgery    Essential hypertension  Blood pressure is not at goal.  This should not interfere with plans for surgery but still needs better control.  I am asking her to restart hydrochlorothiazide taking 12.5 mg every other day.  She will continue losartan daily.  She will monitor her blood pressure weekly and let me know if it is not coming under good control with a goal of less than 140/90 at a minimum.  For surgery, she should take her usual dose of losartan on the morning of surgery but  should hold HCTZ that day  - hydroCHLOROthiazide 12.5 MG tablet; Take 1 tablet (12.5 mg) by mouth every other day.    Class 2 severe obesity due to excess calories with serious comorbidity and body mass index (BMI) of 37.0 to 37.9 in adult (H)      Acquired hypothyroidism  She will take her usual dose of levothyroxine on the morning of surgery with a small sip of water    Overactive bladder  She will take her usual dose of oxybutynin on the morning of surgery with a small sip of water    Rash  She has developed a rash under her abdominal fold that is nonspecific but looks like a candidal infection.  May also be dermatitis.  Will treat with Lotrisone cream twice daily  - clotrimazole-betamethasone (LOTRISONE) 1-0.05 % external cream; Apply topically 2 times daily. to rash on abdomen    The longitudinal plan of care for the diagnosis(es)/condition(s) as documented were addressed during this visit. Due to the added complexity in care, I will continue to support Qian in the subsequent management and with ongoing continuity of care.            Preoperative Medication Instructions    Avoid aspirin and over-the-counter NSAIDs including ibuprofen, Motrin, Advil and Aleve for the week prior to surgery.  You may use Tylenol (acetaminophen) if you need something for pain    Hold fish oil for 1 week prior to surgery    On the morning of surgery, take your usual doses of losartan, levothyroxine and oxybutynin.  Hold all other medications and supplements that morning including hydrochlorothiazide    Recommendation  Approval given to proceed with proposed procedure, without further diagnostic evaluation.    Follow-up  Return in about 3 months (around 9/30/2025) for follow-up for previously scheduled visit.    Maryse Laughlin is a 79 year old, presenting for the following: Here for preop prior to surgery for hammertoes.  See assessment and plan for details  Pre-Op Exam (ARTHROPLASTY digits two,three, four, five right  foot)          6/30/2025     2:42 PM   Additional Questions   Roomed by Mireille HIGH           6/29/2025   Pre-Op Questionnaire   Have you ever had a heart attack or stroke? No   Have you ever had surgery on your heart or blood vessels, such as a stent placement, a coronary artery bypass, or surgery on an artery in your head, neck, heart, or legs? No   Do you have chest pain with activity? No   Do you have a history of heart failure? No   Do you currently have a cold, bronchitis or symptoms of other infection? No   Do you have a cough, shortness of breath, or wheezing? No   Do you or anyone in your family have previous history of blood clots? No   Do you or does anyone in your family have a serious bleeding problem such as prolonged bleeding following surgeries or cuts? No   Have you ever had problems with anemia or been told to take iron pills? No   Have you had any abnormal blood loss such as black, tarry or bloody stools, or abnormal vaginal bleeding? No   Have you ever had a blood transfusion? No   Are you willing to have a blood transfusion if it is medically needed before, during, or after your surgery? Yes   Have you or any of your relatives ever had problems with anesthesia? No   Do you have sleep apnea, excessive snoring or daytime drowsiness? No   Do you have any artifical heart valves or other implanted medical devices like a pacemaker, defibrillator, or continuous glucose monitor? No   Do you have artificial joints? (!) YES   Are you allergic to latex? No         Patient Active Problem List    Diagnosis Date Noted    Hammer toe of right foot 05/20/2025     Priority: Medium    Chronic pain of both feet 04/24/2025     Priority: Medium    Acne 09/09/2024     Priority: Medium    Basal cell carcinoma (BCC) of skin of nose 09/09/2024     Priority: Medium    Lumbar radiculopathy 12/07/2023     Priority: Medium    Vitamin D deficiency 09/01/2023     Priority: Medium    Overactive bladder 08/31/2023     Priority:  Medium    Sacroiliac joint pain 08/31/2023     Priority: Medium    Personal history of colon polyps, unspecified 01/24/2023     Priority: Medium     Colonoscopy November 2019 with multiple polyps.  Colonoscopy December 2022 again with multiple polyps, repeat in 3 years      Class 2 severe obesity due to excess calories with serious comorbidity in adult (H) 08/30/2022     Priority: Medium    Essential hypertension 08/30/2022     Priority: Medium    Chronic right shoulder pain 08/30/2021     Priority: Medium     Chronic rotator cuff tear      History of bilateral knee replacement 08/30/2021     Priority: Medium    SNHL (sensorineural hearing loss) 12/06/2016     Priority: Medium    Tinnitus of both ears 12/06/2016     Priority: Medium    Hyperlipidemia 07/19/2016     Priority: Medium    Osteoarthritis of multiple joints 07/19/2016     Priority: Medium     Bilateral TKA      Hypothyroidism 07/19/2016     Priority: Medium      Past Medical History:   Diagnosis Date    Acne 09/09/2024    Age-related cataract of left eye 12/07/2023    Basal cell carcinoma (BCC) of skin of nose 09/09/2024    Chronic pain of both feet 04/24/2025    Chronic right shoulder pain 08/30/2021    Chronic rotator cuff tear    Class 2 severe obesity due to excess calories with serious comorbidity in adult (H) 08/30/2022    Essential hypertension 08/30/2022    History of bilateral knee replacement 08/30/2021    Hyperlipidemia 07/19/2016    Hypothyroidism 07/19/2016    Lumbar radiculopathy     Obesity (BMI 35.0-39.9 without comorbidity) 07/19/2016    Osteoarthritis of multiple joints 07/19/2016    B TKA    Overactive bladder 08/31/2023    Personal history of colonic polyps     Colonoscopy November 2019 with multiple polyps.  Colonoscopy December 2022 again with multiple polyps, repeat in 3 years    Sacroiliac joint pain 08/31/2023    Screening for osteoporosis     normal DXA 2018.  DEXA October 2023 with some decline but bone density remains normal     SNHL (sensorineural hearing loss) 12/06/2016    Tinnitus of both ears 12/06/2016    Vitamin D deficiency 09/01/2023     Past Surgical History:   Procedure Laterality Date    CATARACT EXTRACTION Bilateral     HYSTERECTOMY Bilateral 01/01/1995    OOPHORECTOMY      TOE SURGERY      hammertoes    TOTAL KNEE ARTHROPLASTY Bilateral 01/01/2008     Current Outpatient Medications   Medication Sig Dispense Refill    acetaminophen (TYLENOL) 500 MG tablet Acetaminophen Extra Strength 500 mg tablet   500mg 2/day      clindamycin (CLEOCIN T) 1 % external lotion Apply topically 2 times daily.      fish oil-omega-3 fatty acids 1000 MG capsule Take 1 capsule by mouth daily      gabapentin (NEURONTIN) 300 MG capsule Take 2 capsules (600 mg) by mouth at bedtime. 180 capsule 3    levothyroxine (SYNTHROID/LEVOTHROID) 125 MCG tablet TAKE 1 TABLET DAILY 90 tablet 3    losartan (COZAAR) 100 MG tablet Take 1 tablet (100 mg) by mouth daily. 90 tablet 3    oxyBUTYnin ER (DITROPAN XL) 5 MG 24 hr tablet Take 1 tablet (5 mg) by mouth daily. 90 tablet 3    pravastatin (PRAVACHOL) 20 MG tablet TAKE 1 TABLET EVERY EVENING 90 tablet 3    tiZANidine (ZANAFLEX) 2 MG tablet Take 1 tablet (2 mg) by mouth 3 times daily as needed for muscle spasms. 20 tablet 0    vitamin D3 (CHOLECALCIFEROL) 50 mcg (2000 units) tablet Take 1 tablet (50 mcg) by mouth daily         Allergies   Allergen Reactions    Penicillin [Penicillin G] Hives     As a child    Nitrofurantoin Nausea    Statins-Hmg-Coa Reductase Inhibitors [Statins] Muscle Pain (Myalgia)        Social History     Tobacco Use    Smoking status: Never     Passive exposure: Never    Smokeless tobacco: Never   Substance Use Topics    Alcohol use: Yes     Comment: infrequent     Family History   Problem Relation Age of Onset    Peripheral Vascular Disease Mother         d 93yo    Coronary Artery Disease Mother     Coronary Artery Disease Father         d 84yo    Rectal Cancer Father     ALS Sister      "Hypertension Sister     Melanoma Brother      History   Drug Use Unknown             Review of Systems  Constitutional, HEENT, cardiovascular, pulmonary, gi and gu systems are negative, except as otherwise noted.    Objective    BP (!) 162/90 (BP Location: Left arm, Patient Position: Sitting, Cuff Size: Adult Regular)   Pulse 59   Temp 98.4  F (36.9  C)   Resp 14   Ht 1.702 m (5' 7\")   Wt 108.9 kg (240 lb)   LMP  (LMP Unknown)   SpO2 95%   BMI 37.59 kg/m     Estimated body mass index is 37.59 kg/m  as calculated from the following:    Height as of this encounter: 1.702 m (5' 7\").    Weight as of this encounter: 108.9 kg (240 lb).  Physical Exam  GENERAL: alert and no distress  HEENT: Normal  NECK: No lymphadenopathy or thyromegaly  RESP: lungs clear to auscultation - no rales, rhonchi or wheezes  CV: regular rate and rhythm, normal S1 S2, no S3 or S4, no murmur, click or rub, no peripheral edema.  Good pedal pulses  ABDOMEN: soft, nontender, no hepatosplenomegaly, no masses and bowel sounds normal  NEURO:intact       Diagnostics  Labs-  Hemoglobin 13.7 platelet 194  Potassium 4.4 creatinine 1.15       No EKG required, no history of coronary heart disease, significant arrhythmia, peripheral arterial disease or other structural heart disease.    Revised Cardiac Risk Index (RCRI)  The patient has the following serious cardiovascular risks for perioperative complications:   - No serious cardiac risks = 0 points     RCRI Interpretation: 0 points: Class I (very low risk - 0.4% complication rate)         Signed Electronically by: Chilo Harris MD  A copy of this evaluation report is provided to the requesting physician.        "

## 2025-07-01 ENCOUNTER — RESULTS FOLLOW-UP (OUTPATIENT)
Dept: INTERNAL MEDICINE | Facility: CLINIC | Age: 80
End: 2025-07-01

## 2025-07-03 ENCOUNTER — TELEPHONE (OUTPATIENT)
Dept: VASCULAR SURGERY | Facility: CLINIC | Age: 80
End: 2025-07-03
Payer: COMMERCIAL

## 2025-07-03 NOTE — TELEPHONE ENCOUNTER
Discussed with patient. She is concerned with the block she will be getting and being non-weight-bearing after surgery as she only has stairs, and no elevator. She will continue to try to come up with options to be able to be at her apartment.

## 2025-07-03 NOTE — TELEPHONE ENCOUNTER
Caller: Qian    Provider: Dr. Jordan Maldonado    Detailed reason for call: Patient just spoke with the pre-op nurse about her surgery next week and has questions that they directed her back to us to answer.  Questions about pain management after the surgery and questions about the anesthesia.      Best phone number to contact: [phone]    Best time to contact: any time    Ok to leave a detailed message: No    Ok to speak to authorized person if needed: No      (Noted to patient if reason is related to wound or incision, to please send a photo via email or [employer].)

## 2025-07-07 PROCEDURE — 28285 REPAIR OF HAMMERTOE: CPT | Mod: T9 | Performed by: PODIATRIST

## 2025-07-07 RX ORDER — OXYCODONE HYDROCHLORIDE 5 MG/1
5 TABLET ORAL EVERY 6 HOURS PRN
Qty: 12 TABLET | Refills: 0 | Status: SHIPPED | OUTPATIENT
Start: 2025-07-07 | End: 2025-07-10

## 2025-07-07 NOTE — OP NOTE
Date: 7/7/2025    Surgeon: LIZ Maldonado DPM    Preoperative diagnosis:   1. Hammertoe 2nd digit right foot  2. Hammertoe 3rd digit right foot  3. Hammertoe 4th digit right foot  4. Hammertoe 5th digit right foot    Postoperative diagnosis: Same    Procedure:   1. Arthroplasty 2nd digit right foot   2. Arthroplasty 3rd digit right foot   3. Arthroplasty 4th digit right foot   4. Arthroplasty 5th digit right foot     Anesthesia: Popliteal block with IV-sedation    Hemostasis: Pneumatic ankle tourniquet 250 mmHg    Pathology: none    Injectables: none    Materials: Arthrex trim fit pin, 4-0 vicryl, 4-0 nylon    Complications: None    Blood loss:  2 cc      Findings: Patient presents for operative intervention for hammertoe deformity digits 2 through 5 right foot foot. Conservative measures were attempted and exhausted.  Risks include but not limited to infection, digital rotation or contraction and need for additional surgical intervention.  Patient questions invited and answered, including appropriate risk, benefits and complications. No guarantees given or implied. Patient has been NPO.    Description: Patient was brought to the operating room and placed on the table in supine position. IV-sedation with popliteal block was administered by the anesthesia department.  The foot was then prepped and draped in usual aseptic manner. The extremity was elevated and exsanguinated. Well-padded ankle pneumatic tourniquet was inflated to 250mmHg and the following procedure was then performed: Attention was directed to the dorsal aspect of the second digit where a dorsal incision was made overlying the PIPJ. The incision was carried into the subcutaneous tissue with care taken to avoid any neurovascular structures and cauterize superficial bleeders. Again the incision was carried into the PIPJ where the head of the proximal phalanx was freed from all soft tissue including the collateral ligaments and extensor digitorum longus  tendon. A sagittal saw was used the resect the head of the proximal phalanx and removed from the surgical site en toto. Next, a 1.5mm Arthrex absorbable pin was inserted into the middle phalanx, cut, and inserted into the proximal phalanx while the digit was held in a corrected and rectus position. The extensor digitorum longus tendon and subcutaneous tissue was reapproximated with 4-0 vicryl in a simple suture fashion and the skin was closed with 4-0 nylon in a simple suture fashion.     Attention was directed to the dorsal aspect of the third digit where a dorsal incision was made overlying the PIPJ. The incision was carried into the subcutaneous tissue with care taken to avoid any neurovascular structures and cauterize superficial bleeders. Again the incision was carried into the PIPJ where the head of the proximal phalanx was freed from all soft tissue including the collateral ligaments and extensor digitorum longus tendon. A sagittal saw was used the resect the head of the proximal phalanx and removed from the surgical site en toto. Next, a 1.5mm Arthrex absorbable pin was inserted into the middle phalanx, cut, and inserted into the proximal phalanx while the digit was held in a corrected and rectus position. The extensor digitorum longus tendon and subcutaneous tissue was reapproximated with 4-0 vicryl in a simple suture fashion and the skin was closed with 4-0 nylon in a simple suture fashion.     Attention was directed to the dorsal aspect of the fourth digit where a dorsal incision was made overlying the PIPJ. The incision was carried into the subcutaneous tissue with care taken to avoid any neurovascular structures and cauterize superficial bleeders. Again the incision was carried into the PIPJ where the head of the proximal phalanx was freed from all soft tissue including the collateral ligaments and extensor digitorum longus tendon. A sagittal saw was used the resect the head of the proximal phalanx and  removed from the surgical site en toto. Next, a 1.5mm Arthrex absorbable pin was inserted into the middle phalanx, cut, and inserted into the proximal phalanx while the digit was held in a corrected and rectus position. The extensor digitorum longus tendon and subcutaneous tissue was reapproximated with 4-0 vicryl in a simple suture fashion and the skin was closed with 4-0 nylon in a simple suture fashion.     Attention was directed to the dorsal aspect of the fifth digit where 2 semiconverging elliptical incisions were made overlying the PIPJ.  The incision was carried into the subcutaneous tissue with care taken to avoid any neurovascular structures and cauterize superficial bleeders. Again the incision was carried into the PIPJ where the head of the proximal phalanx was freed from all soft tissue including the collateral ligaments and extensor digitorum longus tendon. A sagittal saw was used the resect the head of the proximal phalanx and removed from the surgical site en toto.  The extensor digitorum longus tendon and subcutaneous tissue was reapproximated with 4-0 vicryl in a simple suture fashion and the skin was closed with 4-0 nylon in a simple suture fashion.     Dressings consisted of adaptic, 4x4's, helene roll and an ace wrap. The pneumatic tourniquet was released and a hyperemic response was noted to the digits on the right foot.     The patient appeared to tolerate all the procedures and anesthesia well without apparent complications. Patient was transported from the operating room to the recovery room with vital signs stable and neurovascular status as it was pre-operatively to the right foot. Patient to be discharged home per anesthesia. Written and verbal homecare instructions given to remain nonweightbearing on the right foot, surgical dressing to remain intact, elevate foot above waist. Patient to follow up in office for post-operative management in 1 week.

## 2025-07-15 ENCOUNTER — OFFICE VISIT (OUTPATIENT)
Dept: PODIATRY | Facility: CLINIC | Age: 80
End: 2025-07-15
Payer: COMMERCIAL

## 2025-07-15 ENCOUNTER — ANCILLARY PROCEDURE (OUTPATIENT)
Dept: GENERAL RADIOLOGY | Facility: CLINIC | Age: 80
End: 2025-07-15
Attending: PODIATRIST
Payer: COMMERCIAL

## 2025-07-15 DIAGNOSIS — M20.42 HAMMERTOE, BILATERAL: ICD-10-CM

## 2025-07-15 DIAGNOSIS — M20.41 HAMMERTOE, BILATERAL: Primary | ICD-10-CM

## 2025-07-15 DIAGNOSIS — M20.41 HAMMERTOE, BILATERAL: ICD-10-CM

## 2025-07-15 DIAGNOSIS — M20.42 HAMMERTOE, BILATERAL: Primary | ICD-10-CM

## 2025-07-15 PROCEDURE — 73630 X-RAY EXAM OF FOOT: CPT | Mod: RT | Performed by: PODIATRIST

## 2025-07-15 ASSESSMENT — PAIN SCALES - GENERAL: PAINLEVEL_OUTOF10: NO PAIN (0)

## 2025-07-15 NOTE — PATIENT INSTRUCTIONS
Leave your dressing in place until your next appointment    Continue to wear your CAM boot at all times, remain non weight bearing on your right foot.

## 2025-07-15 NOTE — PROGRESS NOTES
Podiatry Progress Note        ASSESSMENT: S/P arthroplasty digits 2, 3, 4, 5 right foot      TREATMENT:  -Surgical sites on the right foot is progressing well.      -I reviewed the patient's weightbearing x-rays of the right foot which indicate arthroplasty procedures along PIPJ digits 2 through 5.    -Gauze dressing applied today which she will keep intact.  Patient able to begin limited ambulation on the right foot in the cam boot.  Continue to elevate right foot above waist.    -She will follow-up with me in 2 weeks for suture removal.     Jordan Maldonado DPM  [facility] Podiatry/Foot & Ankle Surgery      HPI: Qian Lee was seen again today s/p arthroplasty digits 2, 3, 4, 5 right foot.  Doing well today.  Patient admits to mild foot pain today.  She has remained mostly nonweightbearing on the right foot.    Past Medical History:   Diagnosis Date    Acne 09/09/2024    Age-related cataract of left eye 12/07/2023    Basal cell carcinoma (BCC) of skin of nose 09/09/2024    Chronic pain of both feet 04/24/2025    Chronic right shoulder pain 08/30/2021    Chronic rotator cuff tear    Class 2 severe obesity due to excess calories with serious comorbidity in adult (H) 08/30/2022    Essential hypertension 08/30/2022    History of bilateral knee replacement 08/30/2021    Hyperlipidemia 07/19/2016    Hypothyroidism 07/19/2016    Lumbar radiculopathy     Obesity (BMI 35.0-39.9 without comorbidity) 07/19/2016    Osteoarthritis of multiple joints 07/19/2016    B TKA    Overactive bladder 08/31/2023    Personal history of colonic polyps     Colonoscopy November 2019 with multiple polyps.  Colonoscopy December 2022 again with multiple polyps, repeat in 3 years    Sacroiliac joint pain 08/31/2023    Screening for osteoporosis     normal DXA 2018.  DEXA October 2023 with some decline but bone density remains normal    SNHL (sensorineural hearing loss) 12/06/2016    Tinnitus of both ears 12/06/2016    Vitamin D  deficiency 09/01/2023       Allergies   Allergen Reactions    Penicillin [Penicillin G] Hives     As a child    Nitrofurantoin Nausea    Statins-Hmg-Coa Reductase Inhibitors [Statins] Muscle Pain (Myalgia)         Current Outpatient Medications:     acetaminophen (TYLENOL) 500 MG tablet, Acetaminophen Extra Strength 500 mg tablet  500mg 2/day, Disp: , Rfl:     clindamycin (CLEOCIN T) 1 % external lotion, Apply topically 2 times daily., Disp: , Rfl:     clotrimazole-betamethasone (LOTRISONE) 1-0.05 % external cream, Apply topically 2 times daily. to rash on abdomen, Disp: 45 g, Rfl: 1    fish oil-omega-3 fatty acids 1000 MG capsule, Take 1 capsule by mouth daily, Disp: , Rfl:     gabapentin (NEURONTIN) 300 MG capsule, Take 2 capsules (600 mg) by mouth at bedtime., Disp: 180 capsule, Rfl: 3    hydroCHLOROthiazide 12.5 MG tablet, Take 1 tablet (12.5 mg) by mouth every other day., Disp: 45 tablet, Rfl: 3    levothyroxine (SYNTHROID/LEVOTHROID) 125 MCG tablet, TAKE 1 TABLET DAILY, Disp: 90 tablet, Rfl: 3    losartan (COZAAR) 100 MG tablet, Take 1 tablet (100 mg) by mouth daily., Disp: 90 tablet, Rfl: 3    oxyBUTYnin ER (DITROPAN XL) 5 MG 24 hr tablet, Take 1 tablet (5 mg) by mouth daily., Disp: 90 tablet, Rfl: 3    pravastatin (PRAVACHOL) 20 MG tablet, TAKE 1 TABLET EVERY EVENING, Disp: 90 tablet, Rfl: 3    vitamin D3 (CHOLECALCIFEROL) 50 mcg (2000 units) tablet, Take 1 tablet (50 mcg) by mouth daily, Disp: , Rfl:     Review of Systems - Negative       OBJECTIVE:  Appearance: alert, well appearing, and in no distress.    LMP  (LMP Unknown)     @[facility](10,16,17)@         Surgical sites on digits 2,3,4,5 right foot has intact sutures with skin edges well approximated, no gapping noted. No erythema right foot. Neurovascular status unchanged right foot.

## 2025-07-15 NOTE — LETTER
7/15/2025      Qian Lee  [address]  [facility] [place] [address]      Dear Colleague,    Thank you for referring your patient, Qian Lee, to the [facility]. Please see a copy of my visit note below.    Podiatry Progress Note        ASSESSMENT: S/P arthroplasty digits 2, 3, 4, 5 right foot      TREATMENT:  -Surgical sites on the right foot is progressing well.      -I reviewed the patient's weightbearing x-rays of the right foot which indicate arthroplasty procedures along PIPJ digits 2 through 5.    -Gauze dressing applied today which she will keep intact.  Patient able to begin limited ambulation on the right foot in the cam boot.  Continue to elevate right foot above waist.    -She will follow-up with me in 2 weeks for suture removal.     Jordan Maldonado, SONYA  [facility] Podiatry/Foot & Ankle Surgery      HPI: Qian Lee was seen again today s/p arthroplasty digits 2, 3, 4, 5 right foot.  Doing well today.  Patient admits to mild foot pain today.  She has remained mostly nonweightbearing on the right foot.    Past Medical History:   Diagnosis Date     Acne 09/09/2024     Age-related cataract of left eye 12/07/2023     Basal cell carcinoma (BCC) of skin of nose 09/09/2024     Chronic pain of both feet 04/24/2025     Chronic right shoulder pain 08/30/2021    Chronic rotator cuff tear     Class 2 severe obesity due to excess calories with serious comorbidity in adult (H) 08/30/2022     Essential hypertension 08/30/2022     History of bilateral knee replacement 08/30/2021     Hyperlipidemia 07/19/2016     Hypothyroidism 07/19/2016     Lumbar radiculopathy      Obesity (BMI 35.0-39.9 without comorbidity) 07/19/2016     Osteoarthritis of multiple joints 07/19/2016    B TKA     Overactive bladder 08/31/2023     Personal history of colonic polyps     Colonoscopy November 2019 with multiple polyps.  Colonoscopy December 2022 again with multiple polyps,  repeat in 3 years     Sacroiliac joint pain 08/31/2023     Screening for osteoporosis     normal DXA 2018.  DEXA October 2023 with some decline but bone density remains normal     SNHL (sensorineural hearing loss) 12/06/2016     Tinnitus of both ears 12/06/2016     Vitamin D deficiency 09/01/2023       Allergies   Allergen Reactions     Penicillin [Penicillin G] Hives     As a child     Nitrofurantoin Nausea     Statins-Hmg-Coa Reductase Inhibitors [Statins] Muscle Pain (Myalgia)         Current Outpatient Medications:      acetaminophen (TYLENOL) 500 MG tablet, Acetaminophen Extra Strength 500 mg tablet  500mg 2/day, Disp: , Rfl:      clindamycin (CLEOCIN T) 1 % external lotion, Apply topically 2 times daily., Disp: , Rfl:      clotrimazole-betamethasone (LOTRISONE) 1-0.05 % external cream, Apply topically 2 times daily. to rash on abdomen, Disp: 45 g, Rfl: 1     fish oil-omega-3 fatty acids 1000 MG capsule, Take 1 capsule by mouth daily, Disp: , Rfl:      gabapentin (NEURONTIN) 300 MG capsule, Take 2 capsules (600 mg) by mouth at bedtime., Disp: 180 capsule, Rfl: 3     hydroCHLOROthiazide 12.5 MG tablet, Take 1 tablet (12.5 mg) by mouth every other day., Disp: 45 tablet, Rfl: 3     levothyroxine (SYNTHROID/LEVOTHROID) 125 MCG tablet, TAKE 1 TABLET DAILY, Disp: 90 tablet, Rfl: 3     losartan (COZAAR) 100 MG tablet, Take 1 tablet (100 mg) by mouth daily., Disp: 90 tablet, Rfl: 3     oxyBUTYnin ER (DITROPAN XL) 5 MG 24 hr tablet, Take 1 tablet (5 mg) by mouth daily., Disp: 90 tablet, Rfl: 3     pravastatin (PRAVACHOL) 20 MG tablet, TAKE 1 TABLET EVERY EVENING, Disp: 90 tablet, Rfl: 3     vitamin D3 (CHOLECALCIFEROL) 50 mcg (2000 units) tablet, Take 1 tablet (50 mcg) by mouth daily, Disp: , Rfl:     Review of Systems - Negative       OBJECTIVE:  Appearance: alert, well appearing, and in no distress.    LMP  (LMP Unknown)     [facility]@         Surgical sites on digits 2,3,4,5 right foot has intact sutures  with skin edges well approximated, no gapping noted. No erythema right foot. Neurovascular status unchanged right foot.       Again, thank you for allowing me to participate in the care of your patient.        Sincerely,        Jordan Maldonado DPM    Electronically signed

## 2025-07-29 ENCOUNTER — OFFICE VISIT (OUTPATIENT)
Dept: PODIATRY | Facility: CLINIC | Age: 80
End: 2025-07-29
Payer: COMMERCIAL

## 2025-07-29 DIAGNOSIS — M20.41 HAMMERTOE, BILATERAL: Primary | ICD-10-CM

## 2025-07-29 DIAGNOSIS — M20.42 HAMMERTOE, BILATERAL: Primary | ICD-10-CM

## 2025-07-29 PROCEDURE — 99024 POSTOP FOLLOW-UP VISIT: CPT | Performed by: PODIATRIST

## 2025-07-29 NOTE — PATIENT INSTRUCTIONS
You may begin walking in a CAM boot for the next week.    You may begin showering as normal in 1 week    You should avoid soaking your right foot for the next 1 week, this includes swimming and hot tubs.    Continue to monitor the area for breakdown & call us if your wound reopens.    Allow the steri strips to fall off on their own, do not pull these off.

## 2025-07-29 NOTE — LETTER
7/29/2025      Qian Lee  [address]  [place] [address]      Dear Colleague,    Thank you for referring your patient, Qian Lee, to the [facility]. Please see a copy of my visit note below.    Podiatry Progress Note        ASSESSMENT: S/P arthroplasty digits 2, 3, 4, 5 right foot      TREATMENT:  -Surgical sites on the right foot is progressing well.  Sutures removed today, Steri-Strips applied.    -Recommend limited walking on the right foot in a cam boot x 1 week then return to regular shoes if no concerns.  Right foot to remain dry over the next week.    - All questions invited and answered.  Patient is discharged from my care at this time but encouraged to follow-up with any problems questions or concerns as they develop.    Jordan Maldonado DPM  [facility] Podiatry/Foot & Ankle Surgery      HPI: Qian Lee was seen again today s/p arthroplasty digits 2, 3, 4, 5 right foot.  Doing well today.  She has remained mostly nonweightbearing on the right foot.    Past Medical History:   Diagnosis Date     Acne 09/09/2024     Age-related cataract of left eye 12/07/2023     Basal cell carcinoma (BCC) of skin of nose 09/09/2024     Chronic pain of both feet 04/24/2025     Chronic right shoulder pain 08/30/2021    Chronic rotator cuff tear     Class 2 severe obesity due to excess calories with serious comorbidity in adult (H) 08/30/2022     Essential hypertension 08/30/2022     History of bilateral knee replacement 08/30/2021     Hyperlipidemia 07/19/2016     Hypothyroidism 07/19/2016     Lumbar radiculopathy      Obesity (BMI 35.0-39.9 without comorbidity) 07/19/2016     Osteoarthritis of multiple joints 07/19/2016    B TKA     Overactive bladder 08/31/2023     Personal history of colonic polyps     Colonoscopy November 2019 with multiple polyps.  Colonoscopy December 2022 again with multiple polyps, repeat in 3 years     Sacroiliac joint pain  08/31/2023     Screening for osteoporosis     normal DXA 2018.  DEXA October 2023 with some decline but bone density remains normal     SNHL (sensorineural hearing loss) 12/06/2016     Tinnitus of both ears 12/06/2016     Vitamin D deficiency 09/01/2023       Allergies   Allergen Reactions     Penicillin [Penicillin G] Hives     As a child     Nitrofurantoin Nausea     Statins-Hmg-Coa Reductase Inhibitors [Statins] Muscle Pain (Myalgia)         Current Outpatient Medications:      acetaminophen (TYLENOL) 500 MG tablet, Acetaminophen Extra Strength 500 mg tablet  500mg 2/day, Disp: , Rfl:      clindamycin (CLEOCIN T) 1 % external lotion, Apply topically 2 times daily., Disp: , Rfl:      clotrimazole-betamethasone (LOTRISONE) 1-0.05 % external cream, Apply topically 2 times daily. to rash on abdomen, Disp: 45 g, Rfl: 1     fish oil-omega-3 fatty acids 1000 MG capsule, Take 1 capsule by mouth daily, Disp: , Rfl:      gabapentin (NEURONTIN) 300 MG capsule, Take 2 capsules (600 mg) by mouth at bedtime., Disp: 180 capsule, Rfl: 3     hydroCHLOROthiazide 12.5 MG tablet, Take 1 tablet (12.5 mg) by mouth every other day., Disp: 45 tablet, Rfl: 3     levothyroxine (SYNTHROID/LEVOTHROID) 125 MCG tablet, TAKE 1 TABLET DAILY, Disp: 90 tablet, Rfl: 3     losartan (COZAAR) 100 MG tablet, Take 1 tablet (100 mg) by mouth daily., Disp: 90 tablet, Rfl: 3     oxyBUTYnin ER (DITROPAN XL) 5 MG 24 hr tablet, Take 1 tablet (5 mg) by mouth daily., Disp: 90 tablet, Rfl: 3     pravastatin (PRAVACHOL) 20 MG tablet, TAKE 1 TABLET EVERY EVENING, Disp: 90 tablet, Rfl: 3     vitamin D3 (CHOLECALCIFEROL) 50 mcg (2000 units) tablet, Take 1 tablet (50 mcg) by mouth daily, Disp: , Rfl:     Review of Systems - Negative       OBJECTIVE:  Appearance: alert, well appearing, and in no distress.    LMP  (LMP Unknown)     Surgical sites on digits 2,3,4,5 right foot has intact sutures with skin edges well coapted, no gapping noted. No erythema right foot.  Neurovascular status unchanged right foot.       Again, thank you for allowing me to participate in the care of your patient.        Sincerely,        Jordan Maldonado DPM    Electronically signed

## 2025-07-29 NOTE — PROGRESS NOTES
Podiatry Progress Note        ASSESSMENT: S/P arthroplasty digits 2, 3, 4, 5 right foot      TREATMENT:  -Surgical sites on the right foot is progressing well.  Sutures removed today, Steri-Strips applied.    -Recommend limited walking on the right foot in a cam boot x 1 week then return to regular shoes if no concerns.  Right foot to remain dry over the next week.    - All questions invited and answered.  Patient is discharged from my care at this time but encouraged to follow-up with any problems questions or concerns as they develop.    Jordan Maldonado DPM  [facility] Podiatry/Foot & Ankle Surgery      HPI: Qian Lee was seen again today s/p arthroplasty digits 2, 3, 4, 5 right foot.  Doing well today.  She has remained mostly nonweightbearing on the right foot.    Past Medical History:   Diagnosis Date    Acne 09/09/2024    Age-related cataract of left eye 12/07/2023    Basal cell carcinoma (BCC) of skin of nose 09/09/2024    Chronic pain of both feet 04/24/2025    Chronic right shoulder pain 08/30/2021    Chronic rotator cuff tear    Class 2 severe obesity due to excess calories with serious comorbidity in adult (H) 08/30/2022    Essential hypertension 08/30/2022    History of bilateral knee replacement 08/30/2021    Hyperlipidemia 07/19/2016    Hypothyroidism 07/19/2016    Lumbar radiculopathy     Obesity (BMI 35.0-39.9 without comorbidity) 07/19/2016    Osteoarthritis of multiple joints 07/19/2016    B TKA    Overactive bladder 08/31/2023    Personal history of colonic polyps     Colonoscopy November 2019 with multiple polyps.  Colonoscopy December 2022 again with multiple polyps, repeat in 3 years    Sacroiliac joint pain 08/31/2023    Screening for osteoporosis     normal DXA 2018.  DEXA October 2023 with some decline but bone density remains normal    SNHL (sensorineural hearing loss) 12/06/2016    Tinnitus of both ears 12/06/2016    Vitamin D deficiency 09/01/2023       Allergies   Allergen  Reactions    Penicillin [Penicillin G] Hives     As a child    Nitrofurantoin Nausea    Statins-Hmg-Coa Reductase Inhibitors [Statins] Muscle Pain (Myalgia)         Current Outpatient Medications:     acetaminophen (TYLENOL) 500 MG tablet, Acetaminophen Extra Strength 500 mg tablet  500mg 2/day, Disp: , Rfl:     clindamycin (CLEOCIN T) 1 % external lotion, Apply topically 2 times daily., Disp: , Rfl:     clotrimazole-betamethasone (LOTRISONE) 1-0.05 % external cream, Apply topically 2 times daily. to rash on abdomen, Disp: 45 g, Rfl: 1    fish oil-omega-3 fatty acids 1000 MG capsule, Take 1 capsule by mouth daily, Disp: , Rfl:     gabapentin (NEURONTIN) 300 MG capsule, Take 2 capsules (600 mg) by mouth at bedtime., Disp: 180 capsule, Rfl: 3    hydroCHLOROthiazide 12.5 MG tablet, Take 1 tablet (12.5 mg) by mouth every other day., Disp: 45 tablet, Rfl: 3    levothyroxine (SYNTHROID/LEVOTHROID) 125 MCG tablet, TAKE 1 TABLET DAILY, Disp: 90 tablet, Rfl: 3    losartan (COZAAR) 100 MG tablet, Take 1 tablet (100 mg) by mouth daily., Disp: 90 tablet, Rfl: 3    oxyBUTYnin ER (DITROPAN XL) 5 MG 24 hr tablet, Take 1 tablet (5 mg) by mouth daily., Disp: 90 tablet, Rfl: 3    pravastatin (PRAVACHOL) 20 MG tablet, TAKE 1 TABLET EVERY EVENING, Disp: 90 tablet, Rfl: 3    vitamin D3 (CHOLECALCIFEROL) 50 mcg (2000 units) tablet, Take 1 tablet (50 mcg) by mouth daily, Disp: , Rfl:     Review of Systems - Negative       OBJECTIVE:  Appearance: alert, well appearing, and in no distress.    LMP  (LMP Unknown)     Surgical sites on digits 2,3,4,5 right foot has intact sutures with skin edges well coapted, no gapping noted. No erythema right foot. Neurovascular status unchanged right foot.

## 2025-08-11 ENCOUNTER — TELEPHONE (OUTPATIENT)
Dept: VASCULAR SURGERY | Facility: CLINIC | Age: 80
End: 2025-08-11
Payer: COMMERCIAL

## 2025-08-11 DIAGNOSIS — R52 BURNING PAIN: Primary | ICD-10-CM

## 2025-08-13 ENCOUNTER — PATIENT OUTREACH (OUTPATIENT)
Dept: CARE COORDINATION | Facility: CLINIC | Age: 80
End: 2025-08-13
Payer: COMMERCIAL

## 2025-08-25 ENCOUNTER — MYC MEDICAL ADVICE (OUTPATIENT)
Dept: PODIATRY | Facility: CLINIC | Age: 80
End: 2025-08-25
Payer: COMMERCIAL